# Patient Record
Sex: FEMALE | Race: ASIAN | NOT HISPANIC OR LATINO | Employment: OTHER | URBAN - METROPOLITAN AREA
[De-identification: names, ages, dates, MRNs, and addresses within clinical notes are randomized per-mention and may not be internally consistent; named-entity substitution may affect disease eponyms.]

---

## 2017-02-21 ENCOUNTER — TELEPHONE (OUTPATIENT)
Dept: FAMILY MEDICINE CLINIC | Facility: CLINIC | Age: 57
End: 2017-02-21

## 2017-02-21 DIAGNOSIS — I10 ESSENTIAL HYPERTENSION: ICD-10-CM

## 2017-02-21 DIAGNOSIS — E88.81 INSULIN RESISTANCE: ICD-10-CM

## 2017-02-21 DIAGNOSIS — E78.5 HYPERLIPIDEMIA, UNSPECIFIED HYPERLIPIDEMIA TYPE: ICD-10-CM

## 2017-02-21 DIAGNOSIS — E03.9 HYPOTHYROIDISM, UNSPECIFIED TYPE: Primary | ICD-10-CM

## 2017-02-21 NOTE — TELEPHONE ENCOUNTER
Patient is on the schedule for labs tomorrow in preparation for an upcoming physical. Please add orders.

## 2017-02-22 ENCOUNTER — LAB (OUTPATIENT)
Dept: FAMILY MEDICINE CLINIC | Facility: CLINIC | Age: 57
End: 2017-02-22

## 2017-02-22 DIAGNOSIS — E88.81 INSULIN RESISTANCE: ICD-10-CM

## 2017-02-22 DIAGNOSIS — I10 ESSENTIAL HYPERTENSION: ICD-10-CM

## 2017-02-22 DIAGNOSIS — E03.9 HYPOTHYROIDISM, UNSPECIFIED TYPE: ICD-10-CM

## 2017-02-22 DIAGNOSIS — E78.5 HYPERLIPIDEMIA, UNSPECIFIED HYPERLIPIDEMIA TYPE: ICD-10-CM

## 2017-02-23 LAB
ALBUMIN SERPL-MCNC: 4.7 G/DL (ref 3.5–5.5)
ALBUMIN/GLOB SERPL: 1.4 {RATIO} (ref 1.1–2.5)
ALP SERPL-CCNC: 83 IU/L (ref 39–117)
ALT SERPL-CCNC: 22 IU/L (ref 0–32)
AST SERPL-CCNC: 17 IU/L (ref 0–40)
BILIRUB SERPL-MCNC: 0.5 MG/DL (ref 0–1.2)
BUN SERPL-MCNC: 38 MG/DL (ref 6–24)
BUN/CREAT SERPL: 31 (ref 9–23)
CALCIUM SERPL-MCNC: 10.2 MG/DL (ref 8.7–10.2)
CHLORIDE SERPL-SCNC: 91 MMOL/L (ref 96–106)
CHOLEST SERPL-MCNC: 426 MG/DL (ref 100–199)
CO2 SERPL-SCNC: 23 MMOL/L (ref 18–29)
CONV COMMENT: ABNORMAL
CREAT SERPL-MCNC: 1.22 MG/DL (ref 0.57–1)
ERYTHROCYTE [DISTWIDTH] IN BLOOD BY AUTOMATED COUNT: 13.1 % (ref 12.3–15.4)
GLOBULIN SER CALC-MCNC: 3.3 G/DL (ref 1.5–4.5)
GLUCOSE SERPL-MCNC: 89 MG/DL (ref 65–99)
HBA1C MFR BLD: 5.8 % (ref 4.8–5.6)
HCT VFR BLD AUTO: 41.3 % (ref 34–46.6)
HDLC SERPL-MCNC: 59 MG/DL
HGB BLD-MCNC: 14.7 G/DL (ref 11.1–15.9)
LDLC SERPL CALC-MCNC: 343 MG/DL (ref 0–99)
LDLC/HDLC SERPL: 5.8 RATIO UNITS (ref 0–3.2)
MCH RBC QN AUTO: 29.7 PG (ref 26.6–33)
MCHC RBC AUTO-ENTMCNC: 35.6 G/DL (ref 31.5–35.7)
MCV RBC AUTO: 83 FL (ref 79–97)
PLATELET # BLD AUTO: 414 X10E3/UL (ref 150–379)
POTASSIUM SERPL-SCNC: 3.4 MMOL/L (ref 3.5–5.2)
PROT SERPL-MCNC: 8 G/DL (ref 6–8.5)
RBC # BLD AUTO: 4.95 X10E6/UL (ref 3.77–5.28)
SODIUM SERPL-SCNC: 138 MMOL/L (ref 134–144)
TRIGL SERPL-MCNC: 122 MG/DL (ref 0–149)
TSH SERPL DL<=0.005 MIU/L-ACNC: 0.28 UIU/ML (ref 0.45–4.5)
VLDLC SERPL CALC-MCNC: 24 MG/DL (ref 5–40)
WBC # BLD AUTO: 10.7 X10E3/UL (ref 3.4–10.8)

## 2017-02-23 RX ORDER — METOPROLOL SUCCINATE 50 MG/1
50 TABLET, EXTENDED RELEASE ORAL DAILY
Qty: 90 TABLET | Refills: 0 | Status: SHIPPED | OUTPATIENT
Start: 2017-02-23 | End: 2017-03-10 | Stop reason: SDUPTHER

## 2017-02-24 ENCOUNTER — OFFICE VISIT (OUTPATIENT)
Dept: FAMILY MEDICINE CLINIC | Facility: CLINIC | Age: 57
End: 2017-02-24

## 2017-02-24 VITALS
DIASTOLIC BLOOD PRESSURE: 80 MMHG | SYSTOLIC BLOOD PRESSURE: 140 MMHG | OXYGEN SATURATION: 98 % | WEIGHT: 217.9 LBS | BODY MASS INDEX: 37.2 KG/M2 | HEART RATE: 65 BPM | HEIGHT: 64 IN

## 2017-02-24 DIAGNOSIS — Z12.39 SCREENING FOR BREAST CANCER: ICD-10-CM

## 2017-02-24 DIAGNOSIS — E78.2 MIXED HYPERLIPIDEMIA: ICD-10-CM

## 2017-02-24 DIAGNOSIS — Z00.00 ROUTINE GENERAL MEDICAL EXAMINATION AT A HEALTH CARE FACILITY: Primary | ICD-10-CM

## 2017-02-24 DIAGNOSIS — I10 ESSENTIAL HYPERTENSION: ICD-10-CM

## 2017-02-24 DIAGNOSIS — Z11.59 NEED FOR HEPATITIS C SCREENING TEST: ICD-10-CM

## 2017-02-24 DIAGNOSIS — E88.81 INSULIN RESISTANCE: ICD-10-CM

## 2017-02-24 DIAGNOSIS — F41.9 ANXIETY: ICD-10-CM

## 2017-02-24 DIAGNOSIS — E03.9 HYPOTHYROIDISM, UNSPECIFIED TYPE: ICD-10-CM

## 2017-02-24 PROCEDURE — 99396 PREV VISIT EST AGE 40-64: CPT | Performed by: FAMILY MEDICINE

## 2017-02-24 PROCEDURE — 99214 OFFICE O/P EST MOD 30 MIN: CPT | Performed by: FAMILY MEDICINE

## 2017-02-24 RX ORDER — CITALOPRAM 20 MG/1
20 TABLET ORAL
Qty: 90 TABLET | Refills: 3 | COMMUNITY
Start: 2017-02-24 | End: 2017-02-24 | Stop reason: SDUPTHER

## 2017-02-24 RX ORDER — ROSUVASTATIN CALCIUM 20 MG/1
20 TABLET, COATED ORAL DAILY
Qty: 90 TABLET | Refills: 3 | Status: SHIPPED | OUTPATIENT
Start: 2017-02-24 | End: 2017-05-18 | Stop reason: SDUPTHER

## 2017-02-24 RX ORDER — TIZANIDINE 4 MG/1
4 TABLET ORAL 3 TIMES DAILY
COMMUNITY
End: 2022-06-07 | Stop reason: HOSPADM

## 2017-02-24 RX ORDER — CITALOPRAM 20 MG/1
20 TABLET ORAL DAILY
Qty: 90 TABLET | Refills: 3 | Status: SHIPPED | OUTPATIENT
Start: 2017-02-24 | End: 2018-02-20 | Stop reason: SDUPTHER

## 2017-02-24 NOTE — PATIENT INSTRUCTIONS
Personal Prevention Plan of Service   I will call you with lab results.  Date of Office Visit:  2017  Encounter Provider:  Jh Muniz MD  Place of Service:  [facility]  Patient Name: Sandra Kellogg  :  1960    As part of the preventive portion of your visit today, we are providing you with this personalized preventive plan of services (PPPS). This plan is based upon recommendations of the [place] Preventive Services Task Force (USPSTF) and the Advisory Committee on Immunization Practices (ACIP).    This lists the preventive care services that should be considered, and provides dates of when you are due. Items listed as completed are up-to-date and do not require any further intervention.    Health Maintenance   Topic Date Due   • PAP SMEAR  Advised   • LIPID PANEL  2018   • MAMMOGRAM  ordered   • TDAP/TD VACCINES (2 - Td) 2022   • COLONOSCOPY  2024   • HEPATITIS C SCREENING  Completed   • INFLUENZA VACCINE  Completed

## 2017-02-24 NOTE — PROGRESS NOTES
"Preventive Exam    History of Present Illness: Sandra is here for check up and review of routine health maintenance. She states she is doing okay but is struggling with a lot of pain and is followed by pain management.  She has a hx of anxiety and feels that Celexa helps. She would like a refill.  She has hypothyroidism and needs this checked today.She is taking meds as prescribed.  She has elevated choesterol and has not been taking Crestor regularly because of cost.She needs cholesterol checked today.  She has HTN and is on multiple meds and is taking as prescribed,     REVIEW OF SYSTEMS  Constitutional: Negative.    HENT: Negative.    Eyes: Negative.    Respiratory: Negative.    Cardiovascular: Negative.    Gastrointestinal: Negative.    Endocrine: Negative.    Genitourinary: Negative.    Musculoskeletal: Negative for neck stiffness.   Skin: Negative.    Allergic/Immunologic: Negative.    Neurological: Negative.    Hematological: Negative.    Psychiatric/Behavioral: Negative.    All other systems reviewed and are negative.          PHYSICAL EXAM    Vitals:    02/24/17 1434   BP: 140/80   BP Location: Right arm   Patient Position: Sitting   Pulse: 65   SpO2: 98%   Weight: 217 lb 14.4 oz (98.8 kg)   Height: 64\" (162.6 cm)     GENERAL: alert and oriented, afebrile and vital signs stable  HEENT: oral mucosa moist, PEERLA, EOM, conjunctiva normal  No cervical adenopathy  LUNGS: clear to ascultation bilaterally, no rales, ronchi or wheezing  HEART: RRR S1 S2 without murmers, thrills, rubs or gallops  CHEST WALL: within normal limits, no tenderness  BREAST EXAM: No masses, skin changes, tenderness or discharge noted  ABDOMEN: WNL. Normal BS.  EXTREMITIES: No clubbing, cyanosis or edema noted. Normal Pulses.  SKIN: warm, dry, no rashes noted  NEURO: CN II- XII grossly intact    ASSESSMENT AND PLAN  Problem List Items Addressed This Visit     None      Visit Diagnoses     Routine general medical examination at a health care " facility    -  Primary    Hypothyroidism, unspecified type        Relevant Orders    TSH (Completed)    Essential hypertension        Insulin resistance        Relevant Orders    Comprehensive Metabolic Panel (Completed)    Mixed hyperlipidemia        Relevant Medications    rosuvastatin (CRESTOR) 20 MG tablet    Other Relevant Orders    Comprehensive Metabolic Panel (Completed)    Need for hepatitis C screening test        Relevant Orders    Hepatitis C Antibody (Completed)    Screening for breast cancer        Relevant Orders    Mammo Screening Bilateral With CAD    Anxiety        Relevant Medications    citalopram (CeleXA) 20 MG tablet        Routine health maintenance reviewed and discussed with Sandra.    .  Orders Placed This Encounter   Procedures   • Mammo Screening Bilateral With CAD     Standing Status:   Future     Standing Expiration Date:   2/25/2018     Order Specific Question:   Reason for Exam:     Answer:   screening     Order Specific Question:   Is the patient pregnant?     Answer:   No   • Comprehensive Metabolic Panel   • TSH   • Hepatitis C Antibody     Return in about 6 months (around 8/24/2017) for Recheck.

## 2017-02-25 LAB
ALBUMIN SERPL-MCNC: 4.6 G/DL (ref 3.5–5.5)
ALBUMIN/GLOB SERPL: 1.4 {RATIO} (ref 1.1–2.5)
ALP SERPL-CCNC: 83 IU/L (ref 39–117)
ALT SERPL-CCNC: 20 IU/L (ref 0–32)
AST SERPL-CCNC: 19 IU/L (ref 0–40)
BILIRUB SERPL-MCNC: 0.5 MG/DL (ref 0–1.2)
BUN SERPL-MCNC: 33 MG/DL (ref 6–24)
BUN/CREAT SERPL: 26 (ref 9–23)
CALCIUM SERPL-MCNC: 10 MG/DL (ref 8.7–10.2)
CHLORIDE SERPL-SCNC: 92 MMOL/L (ref 96–106)
CO2 SERPL-SCNC: 24 MMOL/L (ref 18–29)
CREAT SERPL-MCNC: 1.27 MG/DL (ref 0.57–1)
GLOBULIN SER CALC-MCNC: 3.3 G/DL (ref 1.5–4.5)
GLUCOSE SERPL-MCNC: 100 MG/DL (ref 65–99)
HCV AB S/CO SERPL IA: <0.1 S/CO RATIO (ref 0–0.9)
POTASSIUM SERPL-SCNC: 3.4 MMOL/L (ref 3.5–5.2)
PROT SERPL-MCNC: 7.9 G/DL (ref 6–8.5)
SODIUM SERPL-SCNC: 139 MMOL/L (ref 134–144)
TSH SERPL DL<=0.005 MIU/L-ACNC: 0.27 UIU/ML (ref 0.45–4.5)

## 2017-02-27 RX ORDER — LISINOPRIL 20 MG/1
20 TABLET ORAL DAILY
Qty: 30 TABLET | Refills: 2 | Status: SHIPPED | OUTPATIENT
Start: 2017-02-27 | End: 2017-07-26 | Stop reason: SDUPTHER

## 2017-03-02 RX ORDER — LEVOTHYROXINE SODIUM 0.12 MG/1
125 TABLET ORAL DAILY
Qty: 30 TABLET | Refills: 6 | Status: SHIPPED | OUTPATIENT
Start: 2017-03-02 | End: 2017-03-15 | Stop reason: SDUPTHER

## 2017-03-10 RX ORDER — METOPROLOL SUCCINATE 50 MG/1
50 TABLET, EXTENDED RELEASE ORAL DAILY
Qty: 90 TABLET | Refills: 0 | Status: SHIPPED | OUTPATIENT
Start: 2017-03-10 | End: 2017-07-20 | Stop reason: SDUPTHER

## 2017-03-15 RX ORDER — HYDROCHLOROTHIAZIDE 25 MG/1
25 TABLET ORAL DAILY
Qty: 90 TABLET | Refills: 0 | Status: SHIPPED | OUTPATIENT
Start: 2017-03-15 | End: 2017-03-30 | Stop reason: SDUPTHER

## 2017-03-15 RX ORDER — AMLODIPINE BESYLATE 10 MG/1
10 TABLET ORAL DAILY
Qty: 90 TABLET | Refills: 0 | Status: SHIPPED | OUTPATIENT
Start: 2017-03-15 | End: 2017-03-31 | Stop reason: SDUPTHER

## 2017-03-15 RX ORDER — LEVOTHYROXINE SODIUM 0.12 MG/1
125 TABLET ORAL DAILY
Qty: 90 TABLET | Refills: 0 | Status: SHIPPED | OUTPATIENT
Start: 2017-03-15 | End: 2017-07-26 | Stop reason: DRUGHIGH

## 2017-03-30 RX ORDER — HYDROCHLOROTHIAZIDE 25 MG/1
25 TABLET ORAL DAILY
Qty: 90 TABLET | Refills: 0 | Status: SHIPPED | OUTPATIENT
Start: 2017-03-30 | End: 2017-07-20

## 2017-03-31 RX ORDER — AMLODIPINE BESYLATE 10 MG/1
10 TABLET ORAL DAILY
Qty: 90 TABLET | Refills: 0 | Status: SHIPPED | OUTPATIENT
Start: 2017-03-31 | End: 2017-06-29 | Stop reason: SDUPTHER

## 2017-05-04 RX ORDER — METOPROLOL SUCCINATE 50 MG/1
TABLET, EXTENDED RELEASE ORAL
Qty: 90 TABLET | Refills: 1 | Status: SHIPPED | OUTPATIENT
Start: 2017-05-04 | End: 2017-10-31 | Stop reason: SDUPTHER

## 2017-05-18 DIAGNOSIS — E78.2 MIXED HYPERLIPIDEMIA: ICD-10-CM

## 2017-05-18 RX ORDER — ROSUVASTATIN CALCIUM 20 MG/1
20 TABLET, COATED ORAL DAILY
Qty: 90 TABLET | Refills: 1 | Status: SHIPPED | OUTPATIENT
Start: 2017-05-18 | End: 2017-10-31 | Stop reason: SDUPTHER

## 2017-06-29 RX ORDER — AMLODIPINE BESYLATE 10 MG/1
TABLET ORAL
Qty: 90 TABLET | Refills: 0 | Status: SHIPPED | OUTPATIENT
Start: 2017-06-29 | End: 2017-09-27 | Stop reason: SDUPTHER

## 2017-07-17 DIAGNOSIS — R73.01 IMPAIRED FASTING GLUCOSE: Primary | ICD-10-CM

## 2017-07-17 DIAGNOSIS — E03.9 UNSPECIFIED HYPOTHYROIDISM: ICD-10-CM

## 2017-07-17 DIAGNOSIS — E78.2 MIXED HYPERLIPIDEMIA: ICD-10-CM

## 2017-07-17 LAB
ALBUMIN SERPL-MCNC: 4.4 G/DL (ref 3.5–5.2)
ALBUMIN/GLOB SERPL: 1.5 G/DL
ALP SERPL-CCNC: 93 U/L (ref 39–117)
ALT SERPL-CCNC: 20 U/L (ref 1–33)
AST SERPL-CCNC: 18 U/L (ref 1–32)
BILIRUB SERPL-MCNC: 0.2 MG/DL (ref 0.1–1.2)
BUN SERPL-MCNC: 26 MG/DL (ref 6–20)
BUN/CREAT SERPL: 27.7 (ref 7–25)
CALCIUM SERPL-MCNC: 10.1 MG/DL (ref 8.6–10.5)
CHLORIDE SERPL-SCNC: 105 MMOL/L (ref 98–107)
CHOLEST SERPL-MCNC: 218 MG/DL (ref 0–200)
CO2 SERPL-SCNC: 23 MMOL/L (ref 22–29)
CREAT SERPL-MCNC: 0.94 MG/DL (ref 0.57–1)
GLOBULIN SER CALC-MCNC: 3 GM/DL
GLUCOSE SERPL-MCNC: 97 MG/DL (ref 65–99)
HBA1C MFR BLD: 5.28 % (ref 4.8–5.6)
HDLC SERPL-MCNC: 64 MG/DL (ref 40–60)
LDLC SERPL CALC-MCNC: 139 MG/DL (ref 0–100)
LDLC/HDLC SERPL: 2.18 {RATIO}
POTASSIUM SERPL-SCNC: 4.2 MMOL/L (ref 3.5–5.2)
PROT SERPL-MCNC: 7.4 G/DL (ref 6–8.5)
SODIUM SERPL-SCNC: 142 MMOL/L (ref 136–145)
TRIGL SERPL-MCNC: 74 MG/DL (ref 0–150)
VLDLC SERPL CALC-MCNC: 14.8 MG/DL (ref 5–40)

## 2017-07-18 LAB — TSH SERPL DL<=0.005 MIU/L-ACNC: 0.88 MIU/ML (ref 0.27–4.2)

## 2017-07-20 ENCOUNTER — OFFICE VISIT (OUTPATIENT)
Dept: FAMILY MEDICINE CLINIC | Facility: CLINIC | Age: 57
End: 2017-07-20

## 2017-07-20 VITALS
HEART RATE: 65 BPM | OXYGEN SATURATION: 98 % | SYSTOLIC BLOOD PRESSURE: 120 MMHG | WEIGHT: 213 LBS | HEIGHT: 64 IN | DIASTOLIC BLOOD PRESSURE: 80 MMHG | BODY MASS INDEX: 36.37 KG/M2

## 2017-07-20 DIAGNOSIS — E78.2 MIXED HYPERLIPIDEMIA: ICD-10-CM

## 2017-07-20 DIAGNOSIS — Z87.891 STOPPED SMOKING WITH GREATER THAN 30 PACK YEAR HISTORY: Primary | ICD-10-CM

## 2017-07-20 DIAGNOSIS — N28.9 RENAL INSUFFICIENCY: ICD-10-CM

## 2017-07-20 DIAGNOSIS — E03.8 OTHER SPECIFIED HYPOTHYROIDISM: ICD-10-CM

## 2017-07-20 DIAGNOSIS — I10 ESSENTIAL HYPERTENSION: ICD-10-CM

## 2017-07-20 DIAGNOSIS — R73.01 IMPAIRED FASTING GLUCOSE: ICD-10-CM

## 2017-07-20 PROBLEM — E78.5 HYPERLIPIDEMIA: Status: ACTIVE | Noted: 2017-07-20

## 2017-07-20 PROCEDURE — 99214 OFFICE O/P EST MOD 30 MIN: CPT | Performed by: FAMILY MEDICINE

## 2017-07-20 NOTE — PROGRESS NOTES
Subjective   Sandra Kellogg is a 57 y.o. female here for 6MO. re-evaluation for HTN and hyperlipidemia.    History of Present Illness   She is still recovering from a knee replacement. She is doing well and progress is on track.  She has a history of chronic hyperlipidemia and needs lab work today to evaluate response to therapy. She has a hx of MI and needs to keep her cholesterol low.  She is tolerating medications well without side effects.  Sandra has a history of chronic hypertension and has been well controlled on current medications.She is tolerating medications without side effect. She reports no vision changes, headaches or lightheadedness. She is requesting refills of medications.  Impaired fasting glucose. She has been watching her diet and doing very well.  She has a long smoking hx and stopped about 9 years ago/  She has a hx of renal insufficiency that is stable.  Sandra has a history of chronic hypothyroidism and has been well controlled on current dose of replacement.She report no concerning symptoms: no cold intolerance, fatigue or hair loss.   The following portions of the patient's history were reviewed and updated as appropriate: allergies, current medications, past medical history, past social history, past surgical history and problem list.    Review of Systems   All other systems reviewed and are negative.      Objective   Physical Exam   Constitutional: She is oriented to person, place, and time. She appears well-developed.   HENT:   Head: Normocephalic and atraumatic.   Eyes: EOM are normal. Pupils are equal, round, and reactive to light.   Cardiovascular: Normal rate, regular rhythm and normal heart sounds.    Pulmonary/Chest: Effort normal and breath sounds normal.   Neurological: She is alert and oriented to person, place, and time.   Skin: Skin is warm and dry.   Psychiatric: She has a normal mood and affect. Her behavior is normal. Judgment and thought content normal.   Nursing note and vitals  reviewed.      Assessment/Plan   Sandra was seen today for hypertension and hypothyroidism.    Diagnoses and all orders for this visit:    Stopped smoking with greater than 30 pack year history  -     CT chest low dose wo; Future    Impaired fasting glucose  Advised a low carb diet and exercise.will check again in 6 months.  Renal insufficiency  Stable at this time - will check again in 6 months.    Essential hypertension  Well controlled on current medication, will refill medication today and as needed. She will RTO for repeat B/P check in 6 months.    Mixed hyperlipidemia  Lipid panel reviewed today and She was advised to continue a healthy low fat diet, include regular exercise and take medications as prescribed.  Hypothyroidism  Well managed on current dose of thyroid replacement. TSH reviewed and on chart.  Well refill medications as needed.

## 2017-07-21 PROBLEM — E03.9 HYPOTHYROIDISM: Status: ACTIVE | Noted: 2017-07-21

## 2017-07-26 RX ORDER — LISINOPRIL 20 MG/1
20 TABLET ORAL DAILY
Qty: 90 TABLET | Refills: 0 | Status: SHIPPED | OUTPATIENT
Start: 2017-07-26 | End: 2017-10-18 | Stop reason: SDUPTHER

## 2017-07-26 RX ORDER — LEVOTHYROXINE SODIUM 0.15 MG/1
150 TABLET ORAL DAILY
Qty: 90 TABLET | Refills: 1 | Status: SHIPPED | OUTPATIENT
Start: 2017-07-26 | End: 2018-01-22 | Stop reason: SDUPTHER

## 2017-09-27 RX ORDER — AMLODIPINE BESYLATE 10 MG/1
TABLET ORAL
Qty: 90 TABLET | Refills: 1 | Status: SHIPPED | OUTPATIENT
Start: 2017-09-27 | End: 2018-03-26 | Stop reason: SDUPTHER

## 2017-10-19 RX ORDER — LISINOPRIL 20 MG/1
TABLET ORAL
Qty: 90 TABLET | Refills: 0 | Status: SHIPPED | OUTPATIENT
Start: 2017-10-19 | End: 2018-01-18 | Stop reason: SDUPTHER

## 2017-10-31 DIAGNOSIS — E78.2 MIXED HYPERLIPIDEMIA: ICD-10-CM

## 2017-10-31 RX ORDER — METOPROLOL SUCCINATE 50 MG/1
TABLET, EXTENDED RELEASE ORAL
Qty: 90 TABLET | Refills: 1 | Status: SHIPPED | OUTPATIENT
Start: 2017-10-31 | End: 2018-04-29 | Stop reason: SDUPTHER

## 2017-10-31 RX ORDER — ROSUVASTATIN CALCIUM 20 MG/1
TABLET, COATED ORAL
Qty: 90 TABLET | Refills: 1 | Status: SHIPPED | OUTPATIENT
Start: 2017-10-31 | End: 2018-04-29 | Stop reason: SDUPTHER

## 2018-01-08 DIAGNOSIS — Z79.899 HIGH RISK MEDICATION USE: ICD-10-CM

## 2018-01-08 DIAGNOSIS — E88.81 METABOLIC SYNDROME: ICD-10-CM

## 2018-01-08 DIAGNOSIS — E78.2 MIXED HYPERLIPIDEMIA: Primary | ICD-10-CM

## 2018-01-08 DIAGNOSIS — E03.9 HYPOTHYROIDISM, UNSPECIFIED TYPE: ICD-10-CM

## 2018-01-08 DIAGNOSIS — Z00.00 ROUTINE GENERAL MEDICAL EXAMINATION AT A HEALTH CARE FACILITY: ICD-10-CM

## 2018-01-13 ENCOUNTER — RESULTS ENCOUNTER (OUTPATIENT)
Dept: FAMILY MEDICINE CLINIC | Facility: CLINIC | Age: 58
End: 2018-01-13

## 2018-01-13 DIAGNOSIS — E03.9 HYPOTHYROIDISM, UNSPECIFIED TYPE: ICD-10-CM

## 2018-01-18 ENCOUNTER — OFFICE VISIT (OUTPATIENT)
Dept: FAMILY MEDICINE CLINIC | Facility: CLINIC | Age: 58
End: 2018-01-18

## 2018-01-18 VITALS
HEART RATE: 64 BPM | OXYGEN SATURATION: 97 % | WEIGHT: 225.2 LBS | BODY MASS INDEX: 38.45 KG/M2 | SYSTOLIC BLOOD PRESSURE: 122 MMHG | HEIGHT: 64 IN | DIASTOLIC BLOOD PRESSURE: 80 MMHG

## 2018-01-18 DIAGNOSIS — I10 ESSENTIAL HYPERTENSION: Primary | ICD-10-CM

## 2018-01-18 DIAGNOSIS — E03.8 OTHER SPECIFIED HYPOTHYROIDISM: ICD-10-CM

## 2018-01-18 DIAGNOSIS — E78.2 MIXED HYPERLIPIDEMIA: ICD-10-CM

## 2018-01-18 PROBLEM — I21.9 MYOCARDIAL INFARCTION (HCC): Status: ACTIVE | Noted: 2018-01-18

## 2018-01-18 PROBLEM — M17.12 ARTHRITIS OF LEFT KNEE: Status: ACTIVE | Noted: 2017-11-01

## 2018-01-18 PROBLEM — Z96.652 STATUS POST TOTAL LEFT KNEE REPLACEMENT: Status: ACTIVE | Noted: 2017-11-01

## 2018-01-18 LAB
ALBUMIN SERPL-MCNC: 4.6 G/DL (ref 3.5–5.2)
ALBUMIN/GLOB SERPL: 1.5 G/DL
ALP SERPL-CCNC: 164 U/L (ref 39–117)
ALT SERPL-CCNC: 22 U/L (ref 1–33)
AST SERPL-CCNC: 16 U/L (ref 1–32)
BILIRUB SERPL-MCNC: 0.3 MG/DL (ref 0.1–1.2)
BUN SERPL-MCNC: 33 MG/DL (ref 6–20)
BUN/CREAT SERPL: 27.7 (ref 7–25)
CALCIUM SERPL-MCNC: 9.8 MG/DL (ref 8.6–10.5)
CHLORIDE SERPL-SCNC: 100 MMOL/L (ref 98–107)
CHOLEST SERPL-MCNC: 256 MG/DL (ref 0–200)
CHOLEST/HDLC SERPL: 5.95 {RATIO}
CO2 SERPL-SCNC: 25.7 MMOL/L (ref 22–29)
CREAT SERPL-MCNC: 1.19 MG/DL (ref 0.57–1)
GLOBULIN SER CALC-MCNC: 3.1 GM/DL
GLUCOSE SERPL-MCNC: 96 MG/DL (ref 65–99)
HDLC SERPL-MCNC: 43 MG/DL (ref 40–60)
LDLC SERPL CALC-MCNC: 183 MG/DL (ref 0–100)
POTASSIUM SERPL-SCNC: 4.7 MMOL/L (ref 3.5–5.2)
PROT SERPL-MCNC: 7.7 G/DL (ref 6–8.5)
SODIUM SERPL-SCNC: 138 MMOL/L (ref 136–145)
TRIGL SERPL-MCNC: 148 MG/DL (ref 0–150)
TSH SERPL DL<=0.005 MIU/L-ACNC: 6.7 MIU/ML (ref 0.27–4.2)
VLDLC SERPL CALC-MCNC: 29.6 MG/DL (ref 5–40)

## 2018-01-18 PROCEDURE — 99214 OFFICE O/P EST MOD 30 MIN: CPT | Performed by: FAMILY MEDICINE

## 2018-01-18 RX ORDER — OXYCODONE AND ACETAMINOPHEN 10; 325 MG/1; MG/1
1-2 TABLET ORAL
COMMUNITY
Start: 2017-11-30 | End: 2019-03-02 | Stop reason: HOSPADM

## 2018-01-18 RX ORDER — LISINOPRIL 20 MG/1
20 TABLET ORAL DAILY
Qty: 90 TABLET | Refills: 3 | Status: SHIPPED | OUTPATIENT
Start: 2018-01-18 | End: 2018-10-02 | Stop reason: DRUGHIGH

## 2018-01-18 RX ORDER — ISOSORBIDE MONONITRATE 60 MG/1
60 TABLET, EXTENDED RELEASE ORAL DAILY
Qty: 90 TABLET | Refills: 3 | Status: SHIPPED | OUTPATIENT
Start: 2018-01-18 | End: 2019-03-20 | Stop reason: SDUPTHER

## 2018-01-18 NOTE — PROGRESS NOTES
Subjective   Sandra Kellogg is a 57 y.o. female.     History of Present Illness   Sandra presents today as a 6 month follow up on her hypertension and hypothyroidism. She has had surgeries on both arms and left total knee since last visit.  Sandra has a history of chronic hypertension and has been well controlled on current medications.She is tolerating medications without side effect. She reports no vision changes, headaches or lightheadedness. She is requesting refills of medications.  She has a history of chronic hyperlipidemia and needs lab work today to evaluate response to therapy. She is tolerating medications well without side effects.  Sandra has a history of chronic hypothyroidism and has been well controlled on current dose of replacement.She report no concerning symptoms: no cold intolerance, fatigue or hair loss.   The following portions of the patient's history were reviewed and updated as appropriate: allergies, current medications, past medical history, past social history, past surgical history and problem list.    Review of Systems   Genitourinary: Positive for frequency.   Musculoskeletal: Positive for back pain, joint swelling and myalgias.   Allergic/Immunologic: Positive for environmental allergies.   Neurological: Positive for numbness (and tingling   form knee surgery  plus poor balance).       Objective   Physical Exam   Constitutional: She appears well-developed and well-nourished.   HENT:   Head: Normocephalic and atraumatic.   Eyes: Conjunctivae and EOM are normal. Pupils are equal, round, and reactive to light.   Neck: Normal range of motion. Neck supple.   Cardiovascular: Normal rate and regular rhythm.    Pulmonary/Chest: Effort normal.   Musculoskeletal: Normal range of motion.   Left wrist in cast   Neurological: She is alert.   Skin: Skin is warm and dry. No rash noted.   Psychiatric: She has a normal mood and affect. Her behavior is normal. Judgment and thought content normal.   Nursing note  and vitals reviewed.      Assessment/Plan   Sandra was seen today for hypertension.    Diagnoses and all orders for this visit:    Essential hypertension  -     lisinopril (PRINIVIL,ZESTRIL) 20 MG tablet; Take 1 tablet by mouth Daily.  -     Comprehensive Metabolic Panel    Other specified hypothyroidism  -     TSH    Mixed hyperlipidemia  -     Comprehensive Metabolic Panel  -     Lipid Panel With / Chol / HDL Ratio    Other orders  -     isosorbide mononitrate (IMDUR) 60 MG 24 hr tablet; Take 1 tablet by mouth Daily.      I will call with ;lab results.

## 2018-01-22 DIAGNOSIS — E03.9 HYPOTHYROIDISM, UNSPECIFIED TYPE: Primary | ICD-10-CM

## 2018-01-22 RX ORDER — LEVOTHYROXINE SODIUM 0.15 MG/1
TABLET ORAL
Qty: 90 TABLET | Refills: 1 | Status: SHIPPED | OUTPATIENT
Start: 2018-01-22 | End: 2018-05-08 | Stop reason: DRUGHIGH

## 2018-02-20 DIAGNOSIS — F41.9 ANXIETY: ICD-10-CM

## 2018-02-20 RX ORDER — CITALOPRAM 20 MG/1
TABLET ORAL
Qty: 90 TABLET | Refills: 3 | Status: SHIPPED | OUTPATIENT
Start: 2018-02-20 | End: 2018-09-04

## 2018-02-27 ENCOUNTER — RESULTS ENCOUNTER (OUTPATIENT)
Dept: FAMILY MEDICINE CLINIC | Facility: CLINIC | Age: 58
End: 2018-02-27

## 2018-02-27 DIAGNOSIS — E03.9 HYPOTHYROIDISM, UNSPECIFIED TYPE: ICD-10-CM

## 2018-03-26 RX ORDER — AMLODIPINE BESYLATE 10 MG/1
TABLET ORAL
Qty: 90 TABLET | Refills: 1 | Status: SHIPPED | OUTPATIENT
Start: 2018-03-26 | End: 2018-09-22 | Stop reason: SDUPTHER

## 2018-04-29 DIAGNOSIS — E78.2 MIXED HYPERLIPIDEMIA: ICD-10-CM

## 2018-04-30 RX ORDER — METOPROLOL SUCCINATE 50 MG/1
TABLET, EXTENDED RELEASE ORAL
Qty: 90 TABLET | Refills: 1 | Status: SHIPPED | OUTPATIENT
Start: 2018-04-30 | End: 2018-10-27 | Stop reason: SDUPTHER

## 2018-04-30 RX ORDER — ROSUVASTATIN CALCIUM 20 MG/1
TABLET, COATED ORAL
Qty: 90 TABLET | Refills: 1 | Status: SHIPPED | OUTPATIENT
Start: 2018-04-30 | End: 2018-10-27 | Stop reason: SDUPTHER

## 2018-05-03 ENCOUNTER — OFFICE VISIT (OUTPATIENT)
Dept: FAMILY MEDICINE CLINIC | Facility: CLINIC | Age: 58
End: 2018-05-03

## 2018-05-03 VITALS
SYSTOLIC BLOOD PRESSURE: 144 MMHG | HEART RATE: 72 BPM | OXYGEN SATURATION: 96 % | HEIGHT: 64 IN | DIASTOLIC BLOOD PRESSURE: 88 MMHG | WEIGHT: 246 LBS | BODY MASS INDEX: 42 KG/M2 | RESPIRATION RATE: 16 BRPM

## 2018-05-03 DIAGNOSIS — Z23 NEED FOR VACCINATION: ICD-10-CM

## 2018-05-03 DIAGNOSIS — Z87.891 HISTORY OF CIGARETTE SMOKING: ICD-10-CM

## 2018-05-03 DIAGNOSIS — E03.9 ACQUIRED HYPOTHYROIDISM: Primary | ICD-10-CM

## 2018-05-03 DIAGNOSIS — I10 ESSENTIAL HYPERTENSION: ICD-10-CM

## 2018-05-03 LAB — TSH SERPL DL<=0.005 MIU/L-ACNC: 0.07 MIU/ML (ref 0.27–4.2)

## 2018-05-03 PROCEDURE — 99214 OFFICE O/P EST MOD 30 MIN: CPT | Performed by: FAMILY MEDICINE

## 2018-05-03 PROCEDURE — 90471 IMMUNIZATION ADMIN: CPT | Performed by: FAMILY MEDICINE

## 2018-05-03 PROCEDURE — 90632 HEPA VACCINE ADULT IM: CPT | Performed by: FAMILY MEDICINE

## 2018-05-03 NOTE — PROGRESS NOTES
Subjective   Sandra Kellogg is a 57 y.o. female.     History of Present Illness   Sandra is here for bp check.  She states she is doing well.  She c/o multi joint aches.  She is  taking multiple medications and tolerating them well.  She denies headache, dizziness or vision changes.  Sandra has a history of chronic hypertension and has been well controlled on current medications.She is tolerating medications without side effect. She reports no vision changes, headaches or lightheadedness. She is requesting refills of medications.  She is a former smoker and has not been screened yet for lung cancer. She is interested in doing that.  The following portions of the patient's history were reviewed and updated as appropriate: allergies, current medications, past family history, past medical history, past social history, past surgical history and problem list.    Review of Systems   Cardiovascular: Positive for leg swelling.   Gastrointestinal: Positive for constipation and diarrhea.   Musculoskeletal: Positive for arthralgias and back pain.   Neurological: Positive for weakness.   Psychiatric/Behavioral: The patient is nervous/anxious.    All other systems reviewed and are negative.      Objective   Physical Exam   Constitutional: She is oriented to person, place, and time. She appears well-developed.   HENT:   Head: Normocephalic and atraumatic.   Eyes: EOM are normal. Pupils are equal, round, and reactive to light.   Neck: Normal range of motion. Neck supple.   Cardiovascular: Normal rate, regular rhythm and normal heart sounds.    Pulmonary/Chest: Effort normal and breath sounds normal.   Musculoskeletal: Normal range of motion.   Neurological: She is alert and oriented to person, place, and time.   Skin: Skin is warm and dry.   Nursing note and vitals reviewed.      Assessment/Plan   Sandra was seen today for hypertension.    Diagnoses and all orders for this visit:    Acquired hypothyroidism  -     TSH collected today to  evaluate response to therapy    Essential hypertension  Well controlled on current medication, will refill medication today and as needed. She will RTO for repeat B/P check in 6 months.    History of cigarette smoking  -     Ambulatory Referral to Multi-Disciplinary Clinic    She is 57, has a 66 pack year history and quit 9 years ago. She is willing to get screening.,

## 2018-05-07 ENCOUNTER — TELEPHONE (OUTPATIENT)
Dept: FAMILY MEDICINE CLINIC | Facility: CLINIC | Age: 58
End: 2018-05-07

## 2018-05-07 NOTE — TELEPHONE ENCOUNTER
msg left for pt to return call so we can find out requested information.        ----- Message from Jh Muniz MD sent at 5/4/2018  6:27 PM EDT -----  Please call Sandra - confirm that she is taking levothyroxine 150 mcg. If this is correct, we need to reduce to 125 mcg and check again in 3 months. Please call in change for her and make the change in the chart.

## 2018-05-08 RX ORDER — LEVOTHYROXINE SODIUM 0.12 MG/1
125 TABLET ORAL DAILY
Qty: 90 TABLET | Refills: 1 | Status: SHIPPED | OUTPATIENT
Start: 2018-05-08 | End: 2018-10-18 | Stop reason: SDUPTHER

## 2018-05-22 NOTE — PATIENT INSTRUCTIONS
Per verbal order from Dr. Soria change RFA to MBB. Case changed.    Left message to return call.     Please f/u in 6 months for a preventive exam.

## 2018-05-29 DIAGNOSIS — Z12.39 BREAST CANCER SCREENING: Primary | ICD-10-CM

## 2018-06-06 DIAGNOSIS — G89.29 OTHER CHRONIC PAIN: Primary | ICD-10-CM

## 2018-06-11 ENCOUNTER — CLINICAL SUPPORT (OUTPATIENT)
Dept: OTHER | Facility: HOSPITAL | Age: 58
End: 2018-06-11

## 2018-06-11 ENCOUNTER — HOSPITAL ENCOUNTER (OUTPATIENT)
Dept: PET IMAGING | Facility: HOSPITAL | Age: 58
Discharge: HOME | End: 2018-06-11
Admitting: NURSE PRACTITIONER

## 2018-06-11 VITALS — BODY MASS INDEX: 44.58 KG/M2 | WEIGHT: 251.6 LBS | HEIGHT: 63 IN

## 2018-06-11 DIAGNOSIS — Z12.2 ENCOUNTER FOR SCREENING FOR LUNG CANCER: ICD-10-CM

## 2018-06-11 DIAGNOSIS — Z87.891 PERSONAL HISTORY OF TOBACCO USE, PRESENTING HAZARDS TO HEALTH: ICD-10-CM

## 2018-06-11 DIAGNOSIS — Z87.891 PERSONAL HISTORY OF TOBACCO USE, PRESENTING HAZARDS TO HEALTH: Primary | ICD-10-CM

## 2018-06-11 PROCEDURE — G0296 VISIT TO DETERM LDCT ELIG: HCPCS | Performed by: NURSE PRACTITIONER

## 2018-06-11 PROCEDURE — G0297 LDCT FOR LUNG CA SCREEN: HCPCS

## 2018-06-11 NOTE — PROGRESS NOTES
"[facility]   LOW-DOSE LUNG CT SHARED DECISION MAKING VISIT    Sandra Kellogg is a pleasant 57 y.o. [place]  female seen today at the request of Jh Muniz MD in our Multidisciplinary Clinic. Here today for a shared decision making visit prior to Low-Dose CT Lung Cancer Screening.    HPI:   57 y.o. female who today reports a 48 pack year history.   her Cancer-related family history includes Breast cancer in her paternal aunt; Esophageal cancer in her paternal aunt; Lung cancer in her father.    Patient does not have a personal history of cancer      SOCIAL HISTORY:  she  reports that she quit smoking about 9 years ago. Her smoking use included Cigarettes. She started smoking about 42 years ago. She has a 48.00 pack-year smoking history. She has never used smokeless tobacco. She reports that she does not drink alcohol or use drugs.    Patient Denies current second hand smoke exposure. Patient currently lives with her  who is a non-smoker.    Patient does have a basement/current risk for radon exposure. Provided patient with written information for the [place] Radon Program and free testing process. Patient Denies prior asbestos exposure. she has been employed as a physical therapist - did grow up in [place] where use of DDT was common with regards to other environmental exposures.      Ht 160 cm (63\")   Wt 114 kg (251 lb 9.6 oz)   BMI 44.57 kg/m²       DISCUSSION HELD TODAY:     Smoking cessation counseling: I advised the patient of the risks in continuing to use tobacco. In addition to cardiovascular risk there is an increased risk for cancers of the oral cavity and pharynx, larynx, lung, esophagus, pancreas, uterine, cervix, kidney, bladder, stomach, colon, rectum and liver as well as myeloid leukemia. We also discussed that this risk may also extend to development of female breast cancer and advanced stage prostate cancer.     Patient verbalized they have remained tobacco free since " quitting about 11 years ago. Patient has noticed improvements in breathing since quitting. Patient counseled on importance of continued abstinence remains committed to remaining a non-smoker.    Shared decision making for lung cancer screening: We discussed the NCCN guidelines for lung cancer screening and importance of annual screening. The patient verbalized understanding that annual screening is recommended for all persons between ages 55 and 80, with at least a 30 pack year smoking history until fifteen years beyond smoking. The Lung Cancer Screening Shared Decision-Making Tool was utilized for review. We discussed possible benefits of screening include reduced risk of dying from lung cancer, potential for increased success of treatment, early detection, and possibility of more treatment options if cancer is detected. Also discussed possible harms of screening including total radiation exposure, false positive result leading to invasive procedures or biopsies with their own risks for complications, as well as over diagnosis or identification of slow growing cancers that would not lead to illness or death. We also discussed the potential impact of co-morbidities and future ability or willingness to undergo diagnosis and treatment should they be needed.    Based on this discussion the patient has decided to proceed with a Low Dose Lung Cancer Screening CT today. The patient requests that the results of his screening be shared with his PCP as well.       The patient verbalizes understanding that we will call with results of this low dose lung CT exam and that assistance will be provided in arranging any necessary follow-up. After review of the NCCN guidelines and recommendations for ongoing screening, the patient verbalized understanding of recommendations for follow-up.     Orders Placed This Encounter   Procedures   • CT Chest Low Dose Wo     Standing Status:   Future     Number of Occurrences:   1     Standing  Expiration Date:   6/11/2019     Scheduling Instructions:      Age 55-77 years. Patient age: 57; Patient is asymptomatic (no signs or symptoms of lung cancer): Yes ; Tobacco smoking history of at least 30 pack years. Patient pack year history: 32 ; If former smoker, number of years ago patient quit smoking (Must be < 15 years): 10     Order Specific Question:   Patient Pregnant     Answer:   No

## 2018-06-13 ENCOUNTER — APPOINTMENT (OUTPATIENT)
Dept: WOMENS IMAGING | Facility: HOSPITAL | Age: 58
End: 2018-06-13

## 2018-06-13 PROCEDURE — 77063 BREAST TOMOSYNTHESIS BI: CPT | Performed by: RADIOLOGY

## 2018-06-13 PROCEDURE — 77067 SCR MAMMO BI INCL CAD: CPT | Performed by: RADIOLOGY

## 2018-06-15 NOTE — PROGRESS NOTES
Result review: low dose CT scans. 3 noncalcified nodules measuring up to 6 mm in right middle lobe. Radiology recommendation for follow-up CT chest in 6 months. Multidisciplinary clinic recommends ordering CT chest w/o as opposed to low dose CT chest as insurance typically will not pay for 2nd low dose at 6 month interval. Spoke with patient by phone regarding result and recommendation. She understands I defer to you for follow up plan. Thank you for your referral, Milady

## 2018-08-08 ENCOUNTER — TELEPHONE (OUTPATIENT)
Dept: FAMILY MEDICINE CLINIC | Facility: CLINIC | Age: 58
End: 2018-08-08

## 2018-08-08 NOTE — TELEPHONE ENCOUNTER
----- Message from Jennifer Charlton sent at 8/8/2018 10:46 AM EDT -----  Dr. Cosme office called to notify the office that she failed her last urine drug screen and will be discharged from Dr. Cosme's care.  (she was prescribed Oxycodone and urine showed only Hydrocodone)

## 2018-09-04 ENCOUNTER — OFFICE VISIT (OUTPATIENT)
Dept: FAMILY MEDICINE CLINIC | Facility: CLINIC | Age: 58
End: 2018-09-04

## 2018-09-04 VITALS
BODY MASS INDEX: 46.07 KG/M2 | SYSTOLIC BLOOD PRESSURE: 150 MMHG | HEART RATE: 80 BPM | DIASTOLIC BLOOD PRESSURE: 90 MMHG | HEIGHT: 63 IN | RESPIRATION RATE: 16 BRPM | WEIGHT: 260 LBS | OXYGEN SATURATION: 98 %

## 2018-09-04 DIAGNOSIS — I10 ESSENTIAL HYPERTENSION: ICD-10-CM

## 2018-09-04 DIAGNOSIS — F34.1 DYSTHYMIA: ICD-10-CM

## 2018-09-04 DIAGNOSIS — E03.9 ACQUIRED HYPOTHYROIDISM: ICD-10-CM

## 2018-09-04 DIAGNOSIS — N28.9 RENAL INSUFFICIENCY: ICD-10-CM

## 2018-09-04 DIAGNOSIS — E78.2 MIXED HYPERLIPIDEMIA: ICD-10-CM

## 2018-09-04 DIAGNOSIS — Z00.00 ROUTINE GENERAL MEDICAL EXAMINATION AT A HEALTH CARE FACILITY: Primary | ICD-10-CM

## 2018-09-04 DIAGNOSIS — N39.41 URGE INCONTINENCE OF URINE: ICD-10-CM

## 2018-09-04 PROBLEM — F32.A DEPRESSION: Status: ACTIVE | Noted: 2018-09-04

## 2018-09-04 LAB
ALBUMIN SERPL-MCNC: 4.4 G/DL (ref 3.5–5.2)
ALBUMIN/GLOB SERPL: 1.5 G/DL
ALP SERPL-CCNC: 96 U/L (ref 39–117)
ALT SERPL-CCNC: 25 U/L (ref 1–33)
AST SERPL-CCNC: 19 U/L (ref 1–32)
BILIRUB SERPL-MCNC: 0.4 MG/DL (ref 0.1–1.2)
BUN SERPL-MCNC: 20 MG/DL (ref 6–20)
BUN/CREAT SERPL: 21.3 (ref 7–25)
CALCIUM SERPL-MCNC: 10.1 MG/DL (ref 8.6–10.5)
CHLORIDE SERPL-SCNC: 101 MMOL/L (ref 98–107)
CHOLEST SERPL-MCNC: 215 MG/DL (ref 0–200)
CHOLEST/HDLC SERPL: 5.51 {RATIO}
CO2 SERPL-SCNC: 23.7 MMOL/L (ref 22–29)
CREAT SERPL-MCNC: 0.94 MG/DL (ref 0.57–1)
ERYTHROCYTE [DISTWIDTH] IN BLOOD BY AUTOMATED COUNT: 12.6 % (ref 11.7–13)
GLOBULIN SER CALC-MCNC: 3 GM/DL
GLUCOSE SERPL-MCNC: 101 MG/DL (ref 65–99)
HCT VFR BLD AUTO: 44.7 % (ref 35.6–45.5)
HDLC SERPL-MCNC: 39 MG/DL (ref 40–60)
HGB BLD-MCNC: 14 G/DL (ref 11.9–15.5)
LDLC SERPL CALC-MCNC: 138 MG/DL (ref 0–100)
MCH RBC QN AUTO: 30 PG (ref 26.9–32)
MCHC RBC AUTO-ENTMCNC: 31.3 G/DL (ref 32.4–36.3)
MCV RBC AUTO: 95.7 FL (ref 80.5–98.2)
PLATELET # BLD AUTO: 297 10*3/MM3 (ref 140–500)
POTASSIUM SERPL-SCNC: 4.2 MMOL/L (ref 3.5–5.2)
PROT SERPL-MCNC: 7.4 G/DL (ref 6–8.5)
RBC # BLD AUTO: 4.67 10*6/MM3 (ref 3.9–5.2)
SODIUM SERPL-SCNC: 141 MMOL/L (ref 136–145)
TRIGL SERPL-MCNC: 190 MG/DL (ref 0–150)
TSH SERPL DL<=0.005 MIU/L-ACNC: 1.23 MIU/ML (ref 0.27–4.2)
VLDLC SERPL CALC-MCNC: 38 MG/DL (ref 5–40)
WBC # BLD AUTO: 7.73 10*3/MM3 (ref 4.5–10.7)

## 2018-09-04 PROCEDURE — 99396 PREV VISIT EST AGE 40-64: CPT | Performed by: FAMILY MEDICINE

## 2018-09-04 PROCEDURE — 99214 OFFICE O/P EST MOD 30 MIN: CPT | Performed by: FAMILY MEDICINE

## 2018-09-04 RX ORDER — OXYBUTYNIN CHLORIDE 5 MG/1
5 TABLET, EXTENDED RELEASE ORAL DAILY
Qty: 90 TABLET | Refills: 1 | Status: SHIPPED | OUTPATIENT
Start: 2018-09-04 | End: 2019-01-09 | Stop reason: SDUPTHER

## 2018-09-04 RX ORDER — DULOXETIN HYDROCHLORIDE 30 MG/1
30 CAPSULE, DELAYED RELEASE ORAL DAILY
Qty: 90 CAPSULE | Refills: 2 | Status: SHIPPED | OUTPATIENT
Start: 2018-09-04 | End: 2018-11-13 | Stop reason: DRUGHIGH

## 2018-09-04 NOTE — PROGRESS NOTES
"Preventive Exam    History of Present Illness: Sandra is here for check up and review of routine health maintenance.   She is still really struggling with pain. She will see a new pain management doc in 2 days.She hurts all over.   Sandra has a history of chronic hypertension and has been well controlled on current medications.She is tolerating medications without side effect. She reports no vision changes, headaches or lightheadedness. She is requesting refills of medications.  Sandra has a history of chronic hypothyroidism and has been well controlled on current dose of replacement.She report no concerning symptoms: no cold intolerance, fatigue or hair loss.   She has a history of chronic hyperlipidemia and needs lab work today to evaluate response to therapy. She is tolerating medications well without side effects.  Sandra has a hx of depression and states that she had not been getting  good relief from symptoms on current medication, Celexa. This is a chronic problem.  She had no intolerable side effects to medication. She has a lot of pain and has not tried Cymbalta. No thoughts of self harm expressed.  She has concerns about urinary incontinence. She states that this started last November after her knee surgery and has continued, gotten worse so she is wearing protective underwear. She would like to try a medication.     REVIEW OF SYSTEMS  Constitutional: Negative.    HENT: Negative.    Eyes: Negative.    Respiratory: Negative.    Cardiovascular: Negative.    Gastrointestinal: Negative.    Endocrine: Negative.    Genitourinary: Negative.    Musculoskeletal: Negative.  Skin: Negative.    Allergic/Immunologic: Negative.    Neurological: Negative.    Hematological: Negative.    Psychiatric/Behavioral: Negative.    All other systems reviewed and are negative.          PHYSICAL EXAM    Vitals:    09/04/18 1022   BP: 150/90   Pulse: 80   Resp: 16   SpO2: 98%   Weight: 118 kg (260 lb)   Height: 160 cm (63\")     GENERAL: " alert and oriented, afebrile and vital signs stable  HEENT: oral mucosa moist, PEERLA, EOM, conjunctiva normal  No cervical adenopathy  LUNGS: clear to ascultation bilaterally, no rales, ronchi or wheezing  HEART: RRR S1 S2 without murmers, thrills, rubs or gallops  CHEST WALL: within normal limits, no tenderness  ABDOMEN: WNL. Normal BS.  EXTREMITIES: No clubbing, cyanosis or edema noted. Normal Pulses.  SKIN: warm, dry, no rashes noted  NEURO: CN II- XII grossly intact    ASSESSMENT AND PLAN  Problem List Items Addressed This Visit        Cardiovascular and Mediastinum    Essential hypertension    Well controlled on current medication, will refill medication today and as needed. She will RTO for repeat B/P check in 6 months.  Relevant Orders    Comprehensive Metabolic Panel    Hyperlipidemia  Will check cholesterol. She is tolerating Lipitor.    Relevant Orders    Lipid Panel With / Chol / HDL Ratio       Endocrine    Acquired hypothyroidism  Well managed on current dose of thyroid replacement. TSH reviewed and on chart.  Well refill medications as needed.    Relevant Orders    TSH       Other    Renal insufficiency    Relevant Orders    Comprehensive Metabolic Panel    Depression  After a discussion, we will try Cymbalta. I have started her on 30 mg and   Will titrate up as needed.    Relevant Medications    DULoxetine (CYMBALTA) 30 MG capsule      Other Visit Diagnoses     Routine general medical examination at a health care facility    -  Primary    Relevant Orders    CBC (No Diff)    Comprehensive Metabolic Panel    Urge incontinence of urine      Will try medication to help with symptoms.    Relevant Medications    oxybutynin XL (DITROPAN-XL) 5 MG 24 hr tablet        Routine health maintenance reviewed and discussed with Sandra.    .  Orders Placed This Encounter   Procedures   • CBC (No Diff)   • Comprehensive Metabolic Panel   • Lipid Panel With / Chol / HDL Ratio   • TSH     Return in about 4 weeks (around  10/2/2018) for Recheck.

## 2018-09-04 NOTE — PATIENT INSTRUCTIONS
Annual Wellness  Personal Prevention Plan of Service   I have started 2 new meds - oxybutynin to help with urinary problems and generic Cymbalta to help with depressive symptoms and pain. Please come back in one month for follow up.  I will call you with lab results.   Date of Office Visit:  2018  Encounter Provider:  Jh Muniz MD  Place of Service:  [facility]  Patient Name: Sandra Kellogg  :  1960    As part of the Annual  Wellness portion of your visit today, we are providing you with this personalized preventive plan of services (PPPS). This plan is based upon recommendations of the [place] Preventive Services Task Force (USPSTF) and the Advisory Committee on Immunization Practices (ACIP).    This lists the preventive care services that should be considered, and provides dates of when you are due. Items listed as completed are up-to-date and do not require any further intervention.    Health Maintenance   Topic Date Due   • ZOSTER VACCINE (1 of 2) 2011   • INFLUENZA VACCINE  2018   • LIPID PANEL  2019   • LUNG CANCER SCREENING  2019   • ANNUAL PHYSICAL  2019   • MAMMOGRAM  2020   • TDAP/TD VACCINES (2 - Td) 2022   • PAP SMEAR  2023   • COLONOSCOPY  2024   • HEPATITIS C SCREENING  Completed       Orders Placed This Encounter   Procedures   • CBC (No Diff)   • Comprehensive Metabolic Panel   • Lipid Panel With / Chol / HDL Ratio   • TSH       Return in about 4 weeks (around 10/2/2018) for Recheck.

## 2018-09-24 RX ORDER — AMLODIPINE BESYLATE 10 MG/1
TABLET ORAL
Qty: 90 TABLET | Refills: 1 | Status: SHIPPED | OUTPATIENT
Start: 2018-09-24 | End: 2019-01-29 | Stop reason: HOSPADM

## 2018-10-02 ENCOUNTER — OFFICE VISIT (OUTPATIENT)
Dept: FAMILY MEDICINE CLINIC | Facility: CLINIC | Age: 58
End: 2018-10-02

## 2018-10-02 VITALS
OXYGEN SATURATION: 98 % | HEIGHT: 63 IN | HEART RATE: 57 BPM | DIASTOLIC BLOOD PRESSURE: 92 MMHG | BODY MASS INDEX: 44.65 KG/M2 | SYSTOLIC BLOOD PRESSURE: 154 MMHG | WEIGHT: 252 LBS | RESPIRATION RATE: 16 BRPM

## 2018-10-02 DIAGNOSIS — N39.41 URGE INCONTINENCE OF URINE: Primary | ICD-10-CM

## 2018-10-02 DIAGNOSIS — Z23 NEED FOR VACCINATION: ICD-10-CM

## 2018-10-02 DIAGNOSIS — G89.29 OTHER CHRONIC PAIN: ICD-10-CM

## 2018-10-02 DIAGNOSIS — I10 ESSENTIAL HYPERTENSION: ICD-10-CM

## 2018-10-02 PROCEDURE — 99214 OFFICE O/P EST MOD 30 MIN: CPT | Performed by: FAMILY MEDICINE

## 2018-10-02 PROCEDURE — 90471 IMMUNIZATION ADMIN: CPT | Performed by: FAMILY MEDICINE

## 2018-10-02 PROCEDURE — 90674 CCIIV4 VAC NO PRSV 0.5 ML IM: CPT | Performed by: FAMILY MEDICINE

## 2018-10-02 RX ORDER — LISINOPRIL 40 MG/1
40 TABLET ORAL DAILY
Qty: 90 TABLET | Refills: 2 | Status: SHIPPED | OUTPATIENT
Start: 2018-10-02 | End: 2019-01-29 | Stop reason: HOSPADM

## 2018-10-02 NOTE — PATIENT INSTRUCTIONS
I have increased your lisinopril to 40 mg a day and let's check this again in 2-3 weeks.  Come back in 5 months for fasting labs.

## 2018-10-02 NOTE — PROGRESS NOTES
Subjective   Sandra Kellogg is a 58 y.o. female.     History of Present Illness   Sandra is here for a f/u on Cymbalta. Patient states that she does feel better on the medication. She feels that this is helping with her pain and mood. She is much brighter today.   She is taking oxybutynin for urge incontinence and feels that this is really helping.  She has a hx of HTN and is not well controlled on current medications. She is taking lisinopril 20 mg and amlodipine 10 mg   The following portions of the patient's history were reviewed and updated as appropriate: allergies, current medications, past family history, past medical history, past social history, past surgical history and problem list.    Review of Systems   Constitutional: Negative for activity change, appetite change, chills, fatigue, fever and unexpected weight change.   HENT: Positive for congestion and sinus pressure. Negative for ear pain, hearing loss, nosebleeds, rhinorrhea and sore throat.    Eyes: Negative for pain, redness and visual disturbance.   Respiratory: Negative for cough, shortness of breath and wheezing.    Cardiovascular: Negative for chest pain, palpitations and leg swelling.   Gastrointestinal: Negative for abdominal pain, blood in stool, constipation, diarrhea, nausea and vomiting.   Endocrine: Negative for cold intolerance and heat intolerance.   Genitourinary: Negative for difficulty urinating, dysuria, frequency, hematuria, pelvic pain, urgency and vaginal discharge.   Musculoskeletal: Positive for back pain. Negative for arthralgias and joint swelling.   Skin: Negative for rash and wound.   Allergic/Immunologic: Positive for environmental allergies.   Neurological: Positive for numbness. Negative for dizziness, weakness and headaches.   Hematological: Does not bruise/bleed easily.   Psychiatric/Behavioral: Negative for dysphoric mood, sleep disturbance and suicidal ideas. The patient is not nervous/anxious.        Objective   Physical  Exam   Constitutional: She is oriented to person, place, and time. She appears well-developed. No distress.   Ambulating with a cane   Cardiovascular: Normal rate, regular rhythm, normal heart sounds and intact distal pulses.    No murmur heard.  Pulmonary/Chest: Effort normal and breath sounds normal.   Neurological: She is alert and oriented to person, place, and time.   Psychiatric: She has a normal mood and affect. Her behavior is normal. Judgment and thought content normal.   Nursing note and vitals reviewed.      Assessment/Plan   Sandra was seen today for depression and follow-up.    Diagnoses and all orders for this visit:    Urge incontinence of urine  Doing well on current medication and feels it makes a big difference. Will refill.     Need for vaccination  -     Flucelvax Quad=>4Years ([id number])    Other chronic pain  She has had an excellent response to Cymbalta and will continue. She has pain relief and a real mood lift.     Essential hypertension  -     lisinopril (PRINIVIL,ZESTRIL) 40 MG tablet; Take 1 tablet by mouth Daily.  Not well controlled on current dose so I have increased her medication.  Patient Instructions   I have increased your lisinopril to 40 mg a day and let's check this again in 2-3 weeks.  Come back in 5 months for fasting labs.

## 2018-10-15 DIAGNOSIS — I10 ESSENTIAL HYPERTENSION: ICD-10-CM

## 2018-10-16 RX ORDER — LISINOPRIL 20 MG/1
TABLET ORAL
Qty: 90 TABLET | Refills: 1 | Status: SHIPPED | OUTPATIENT
Start: 2018-10-16 | End: 2018-12-04 | Stop reason: DRUGHIGH

## 2018-10-19 RX ORDER — LEVOTHYROXINE SODIUM 0.12 MG/1
TABLET ORAL
Qty: 90 TABLET | Refills: 1 | Status: SHIPPED | OUTPATIENT
Start: 2018-10-19 | End: 2019-01-09 | Stop reason: SDUPTHER

## 2018-10-27 DIAGNOSIS — E78.2 MIXED HYPERLIPIDEMIA: ICD-10-CM

## 2018-10-29 RX ORDER — METOPROLOL SUCCINATE 50 MG/1
TABLET, EXTENDED RELEASE ORAL
Qty: 90 TABLET | Refills: 1 | Status: SHIPPED | OUTPATIENT
Start: 2018-10-29 | End: 2019-11-20 | Stop reason: SDUPTHER

## 2018-10-29 RX ORDER — ROSUVASTATIN CALCIUM 20 MG/1
TABLET, COATED ORAL
Qty: 90 TABLET | Refills: 1 | Status: SHIPPED | OUTPATIENT
Start: 2018-10-29 | End: 2019-03-20 | Stop reason: SDUPTHER

## 2018-11-12 ENCOUNTER — TELEPHONE (OUTPATIENT)
Dept: FAMILY MEDICINE CLINIC | Facility: CLINIC | Age: 58
End: 2018-11-12

## 2018-11-12 NOTE — TELEPHONE ENCOUNTER
EDIN MISTRY and said she was supposed to call and let you know when her Cymbalta was no longer working for her.  It is no longer working.

## 2018-11-13 RX ORDER — DULOXETIN HYDROCHLORIDE 60 MG/1
60 CAPSULE, DELAYED RELEASE ORAL DAILY
Qty: 30 CAPSULE | Refills: 0 | Status: SHIPPED | OUTPATIENT
Start: 2018-11-13 | End: 2018-12-18 | Stop reason: SDUPTHER

## 2018-11-13 NOTE — TELEPHONE ENCOUNTER
She is on Cymbalta 30 mg - advise her to start taking 2 a day and follow up with me in 2-3 weeks.

## 2018-12-04 ENCOUNTER — OFFICE VISIT (OUTPATIENT)
Dept: FAMILY MEDICINE CLINIC | Facility: CLINIC | Age: 58
End: 2018-12-04

## 2018-12-04 VITALS
HEART RATE: 71 BPM | HEIGHT: 63 IN | RESPIRATION RATE: 16 BRPM | SYSTOLIC BLOOD PRESSURE: 154 MMHG | BODY MASS INDEX: 46.6 KG/M2 | WEIGHT: 263 LBS | DIASTOLIC BLOOD PRESSURE: 84 MMHG | OXYGEN SATURATION: 98 %

## 2018-12-04 DIAGNOSIS — I10 ESSENTIAL HYPERTENSION: Primary | ICD-10-CM

## 2018-12-04 DIAGNOSIS — F34.1 DYSTHYMIA: ICD-10-CM

## 2018-12-04 DIAGNOSIS — G89.4 CHRONIC PAIN SYNDROME: ICD-10-CM

## 2018-12-04 PROCEDURE — 99214 OFFICE O/P EST MOD 30 MIN: CPT | Performed by: FAMILY MEDICINE

## 2018-12-04 RX ORDER — HYDROCHLOROTHIAZIDE 25 MG/1
25 TABLET ORAL DAILY
Qty: 90 TABLET | Refills: 2 | Status: SHIPPED | OUTPATIENT
Start: 2018-12-04 | End: 2019-01-29 | Stop reason: HOSPADM

## 2018-12-04 NOTE — PROGRESS NOTES
Subjective   Sandra Kellogg is a 58 y.o. female.     History of Present Illness   Sandra is here for follow up of medication increase.  Her Cymbalta and Lisinopril were both increased.  She states she seems to be hurting more but believes that may be the cold weather.I increased Cymbalta for her to 60 mg about 2 weeks ago and she hoped this will help.      She states she takes her bp at home and she states it is lower at home than it is at this office.She is taking lisinopril as advised. She is willing to try adding a medication.     The following portions of the patient's history were reviewed and updated as appropriate: allergies, current medications, past medical history, past social history, past surgical history and problem list.    Review of Systems   Genitourinary: Positive for frequency.   Psychiatric/Behavioral: The patient is nervous/anxious.    All other systems reviewed and are negative.      Objective   Physical Exam   Constitutional: She is oriented to person, place, and time. She appears well-developed. No distress.   Ambulating with a cane   HENT:   Head: Normocephalic and atraumatic.   Eyes: EOM are normal. Pupils are equal, round, and reactive to light.   Neck: Normal range of motion.   Cardiovascular: Normal rate, regular rhythm, normal heart sounds and intact distal pulses.   No murmur heard.  Pulmonary/Chest: Effort normal and breath sounds normal.   Neurological: She is alert and oriented to person, place, and time.   Skin: Skin is warm and dry. No rash noted.   Psychiatric: She has a normal mood and affect. Her behavior is normal. Judgment and thought content normal.   Nursing note and vitals reviewed.      Assessment/Plan   Sandra was seen today for depression and hypertension.    Diagnoses and all orders for this visit:    Essential hypertension  She is not well controlled on current medication, will add HCTZ 25 mg and see if that will help. She will RTO for repeat B/P check in 2  months.    Dysthymia  Well controlled on current medication, will refill medication today and as needed. She will RTO for repeat B/P check in 3 months.    Chronic pain syndrome  She is followed by pain management and we are hoping Cymbalta will help.

## 2018-12-18 RX ORDER — DULOXETIN HYDROCHLORIDE 60 MG/1
60 CAPSULE, DELAYED RELEASE ORAL DAILY
Qty: 30 CAPSULE | Refills: 5 | Status: SHIPPED | OUTPATIENT
Start: 2018-12-18 | End: 2019-01-09 | Stop reason: SDUPTHER

## 2019-01-09 DIAGNOSIS — N39.41 URGE INCONTINENCE OF URINE: ICD-10-CM

## 2019-01-09 RX ORDER — OXYBUTYNIN CHLORIDE 5 MG/1
5 TABLET, EXTENDED RELEASE ORAL DAILY
Qty: 90 TABLET | Refills: 1 | Status: SHIPPED | OUTPATIENT
Start: 2019-01-09 | End: 2019-03-02 | Stop reason: HOSPADM

## 2019-01-09 RX ORDER — DULOXETIN HYDROCHLORIDE 60 MG/1
60 CAPSULE, DELAYED RELEASE ORAL DAILY
Qty: 90 CAPSULE | Refills: 1 | Status: SHIPPED | OUTPATIENT
Start: 2019-01-09 | End: 2019-05-09 | Stop reason: SDUPTHER

## 2019-01-09 RX ORDER — LEVOTHYROXINE SODIUM 0.12 MG/1
125 TABLET ORAL DAILY
Qty: 90 TABLET | Refills: 1 | Status: SHIPPED | OUTPATIENT
Start: 2019-01-09 | End: 2019-07-14 | Stop reason: SDUPTHER

## 2019-01-17 ENCOUNTER — OFFICE VISIT (OUTPATIENT)
Dept: FAMILY MEDICINE CLINIC | Facility: CLINIC | Age: 59
End: 2019-01-17

## 2019-01-17 VITALS
SYSTOLIC BLOOD PRESSURE: 124 MMHG | OXYGEN SATURATION: 99 % | RESPIRATION RATE: 16 BRPM | BODY MASS INDEX: 43.59 KG/M2 | HEART RATE: 94 BPM | DIASTOLIC BLOOD PRESSURE: 60 MMHG | WEIGHT: 246 LBS | HEIGHT: 63 IN

## 2019-01-17 DIAGNOSIS — R10.84 ABDOMINAL DISCOMFORT, GENERALIZED: ICD-10-CM

## 2019-01-17 DIAGNOSIS — R19.7 DIARRHEA, UNSPECIFIED TYPE: ICD-10-CM

## 2019-01-17 DIAGNOSIS — R11.2 NON-INTRACTABLE VOMITING WITH NAUSEA, UNSPECIFIED VOMITING TYPE: ICD-10-CM

## 2019-01-17 DIAGNOSIS — I71.40 ABDOMINAL AORTIC ANEURYSM (AAA) WITHOUT RUPTURE (HCC): Primary | ICD-10-CM

## 2019-01-17 PROCEDURE — 99214 OFFICE O/P EST MOD 30 MIN: CPT | Performed by: FAMILY MEDICINE

## 2019-01-17 RX ORDER — METOCLOPRAMIDE HYDROCHLORIDE 5 MG/1
5 TABLET, ORALLY DISINTEGRATING ORAL
Qty: 30 TABLET | Refills: 1 | Status: SHIPPED | OUTPATIENT
Start: 2019-01-17 | End: 2019-02-26 | Stop reason: HOSPADM

## 2019-01-17 NOTE — PROGRESS NOTES
Subjective   Sandra Kellogg is a 58 y.o. female.     History of Present Illness   Sandra is here w/ several issues.  Her pain mgmt md ordered some imaging in preparation for epidural blocks.  She states an AAA was found and she is very concerned.  Sandra is also have significant pain in her abd, n/v,diarrhea and weight loss.  She states she has episodes much like this about once a year. She has seen Dr Triplett in the past but nothing was found.  She has lost 12# in about 6 weeks.  Sandra is here today to discuss.She is not eating much. She has been told that she has gastroparesis. She has never tried Reglan. She is on opioids for pain     The following portions of the patient's history were reviewed and updated as appropriate: allergies, current medications, past medical history, past social history, past surgical history and problem list.    Review of Systems   Constitutional: Positive for appetite change and unexpected weight change.   HENT: Negative.    Eyes: Negative.    Respiratory: Negative.    Cardiovascular: Negative.    Gastrointestinal: Positive for abdominal pain, diarrhea, nausea and vomiting.   Endocrine: Negative.    Genitourinary: Negative.    Musculoskeletal: Positive for arthralgias and back pain.   Allergic/Immunologic: Negative.    Neurological: Negative.    Psychiatric/Behavioral: Negative.        Objective   Physical Exam   Constitutional: She is oriented to person, place, and time. She appears well-developed.   HENT:   Head: Normocephalic and atraumatic.   Eyes: EOM are normal. Pupils are equal, round, and reactive to light.   Cardiovascular: Normal rate and regular rhythm.   Pulmonary/Chest: Effort normal.   Abdominal: Soft. Bowel sounds are normal. She exhibits no distension and no mass. There is no tenderness. There is no rebound and no guarding. No hernia.   Neurological: She is alert and oriented to person, place, and time.   Skin: Skin is warm and dry.   Psychiatric: She has a normal mood and affect.  Her behavior is normal. Judgment and thought content normal.   Nursing note and vitals reviewed.      Assessment/Plan   Sandra was seen today for gi problem.    Diagnoses and all orders for this visit:    Abdominal aortic aneurysm (AAA) without rupture (CMS/HCC)  -     US Abdomen Complete; Future    Non-intractable vomiting with nausea, unspecified vomiting type  -     metoclopramide (METOZOLV) 5 MG tablet dispersible dispersable tablet; Take 1 tablet by mouth 3 (Three) Times a Day Before Meals.  -     Ambulatory Referral to Gastroenterology    Abdominal discomfort, generalized  -     metoclopramide (METOZOLV) 5 MG tablet dispersible dispersable tablet; Take 1 tablet by mouth 3 (Three) Times a Day Before Meals.  -     Ambulatory Referral to Gastroenterology    Diarrhea, unspecified type  -     Ambulatory Referral to Gastroenterology    I have started her on Reglan to see if that will help and referred her to gastro for evalaution

## 2019-01-23 ENCOUNTER — APPOINTMENT (OUTPATIENT)
Dept: CT IMAGING | Facility: HOSPITAL | Age: 59
End: 2019-01-23

## 2019-01-23 ENCOUNTER — HOSPITAL ENCOUNTER (INPATIENT)
Facility: HOSPITAL | Age: 59
LOS: 5 days | Discharge: HOME | End: 2019-01-29
Attending: EMERGENCY MEDICINE | Admitting: HOSPITALIST

## 2019-01-23 DIAGNOSIS — N28.9 ACUTE KIDNEY INSUFFICIENCY: Primary | ICD-10-CM

## 2019-01-23 DIAGNOSIS — R19.7 NAUSEA VOMITING AND DIARRHEA: ICD-10-CM

## 2019-01-23 DIAGNOSIS — E86.0 DEHYDRATION: ICD-10-CM

## 2019-01-23 DIAGNOSIS — R11.2 NAUSEA VOMITING AND DIARRHEA: ICD-10-CM

## 2019-01-23 LAB
ALBUMIN SERPL-MCNC: 4.6 G/DL (ref 3.5–5.2)
ALBUMIN/GLOB SERPL: 1.4 G/DL
ALP SERPL-CCNC: 63 U/L (ref 39–117)
ALT SERPL W P-5'-P-CCNC: 29 U/L (ref 1–33)
ANION GAP SERPL CALCULATED.3IONS-SCNC: 20.5 MMOL/L
AST SERPL-CCNC: 22 U/L (ref 1–32)
BACTERIA UR QL AUTO: ABNORMAL /HPF
BASOPHILS # BLD AUTO: 0.03 10*3/MM3 (ref 0–0.2)
BASOPHILS NFR BLD AUTO: 0.4 % (ref 0–1.5)
BILIRUB SERPL-MCNC: 0.4 MG/DL (ref 0.1–1.2)
BILIRUB UR QL STRIP: NEGATIVE
BUN BLD-MCNC: 60 MG/DL (ref 6–20)
BUN/CREAT SERPL: 21.7 (ref 7–25)
CALCIUM SPEC-SCNC: 10.3 MG/DL (ref 8.6–10.5)
CHLORIDE SERPL-SCNC: 100 MMOL/L (ref 98–107)
CLARITY UR: ABNORMAL
CO2 SERPL-SCNC: 17.5 MMOL/L (ref 22–29)
COLOR UR: YELLOW
CREAT BLD-MCNC: 2.77 MG/DL (ref 0.57–1)
DEPRECATED RDW RBC AUTO: 43.6 FL (ref 37–54)
EOSINOPHIL # BLD AUTO: 0.17 10*3/MM3 (ref 0–0.7)
EOSINOPHIL NFR BLD AUTO: 2 % (ref 0.3–6.2)
ERYTHROCYTE [DISTWIDTH] IN BLOOD BY AUTOMATED COUNT: 12.5 % (ref 11.7–13)
GFR SERPL CREATININE-BSD FRML MDRD: 18 ML/MIN/1.73
GLOBULIN UR ELPH-MCNC: 3.3 GM/DL
GLUCOSE BLD-MCNC: 143 MG/DL (ref 65–99)
GLUCOSE UR STRIP-MCNC: NEGATIVE MG/DL
HCT VFR BLD AUTO: 43.3 % (ref 35.6–45.5)
HGB BLD-MCNC: 14.4 G/DL (ref 11.9–15.5)
HGB UR QL STRIP.AUTO: NEGATIVE
HYALINE CASTS UR QL AUTO: ABNORMAL /LPF
IMM GRANULOCYTES # BLD AUTO: 0.02 10*3/MM3 (ref 0–0.03)
IMM GRANULOCYTES NFR BLD AUTO: 0.2 % (ref 0–0.5)
KETONES UR QL STRIP: ABNORMAL
LEUKOCYTE ESTERASE UR QL STRIP.AUTO: ABNORMAL
LIPASE SERPL-CCNC: 69 U/L (ref 13–60)
LYMPHOCYTES # BLD AUTO: 1.68 10*3/MM3 (ref 0.9–4.8)
LYMPHOCYTES NFR BLD AUTO: 20.2 % (ref 19.6–45.3)
MCH RBC QN AUTO: 31.9 PG (ref 26.9–32)
MCHC RBC AUTO-ENTMCNC: 33.3 G/DL (ref 32.4–36.3)
MCV RBC AUTO: 96 FL (ref 80.5–98.2)
MONOCYTES # BLD AUTO: 0.6 10*3/MM3 (ref 0.2–1.2)
MONOCYTES NFR BLD AUTO: 7.2 % (ref 5–12)
NEUTROPHILS # BLD AUTO: 5.83 10*3/MM3 (ref 1.9–8.1)
NEUTROPHILS NFR BLD AUTO: 70 % (ref 42.7–76)
NITRITE UR QL STRIP: NEGATIVE
PH UR STRIP.AUTO: <=5 [PH] (ref 5–8)
PLATELET # BLD AUTO: 374 10*3/MM3 (ref 140–500)
PMV BLD AUTO: 9.1 FL (ref 6–12)
POTASSIUM BLD-SCNC: 4.4 MMOL/L (ref 3.5–5.2)
PROT SERPL-MCNC: 7.9 G/DL (ref 6–8.5)
PROT UR QL STRIP: ABNORMAL
RBC # BLD AUTO: 4.51 10*6/MM3 (ref 3.9–5.2)
RBC # UR: ABNORMAL /HPF
REF LAB TEST METHOD: ABNORMAL
SODIUM BLD-SCNC: 138 MMOL/L (ref 136–145)
SP GR UR STRIP: 1.02 (ref 1–1.03)
SQUAMOUS #/AREA URNS HPF: ABNORMAL /HPF
UROBILINOGEN UR QL STRIP: ABNORMAL
WBC NRBC COR # BLD: 8.33 10*3/MM3 (ref 4.5–10.7)
WBC UR QL AUTO: ABNORMAL /HPF

## 2019-01-23 PROCEDURE — 74176 CT ABD & PELVIS W/O CONTRAST: CPT

## 2019-01-23 PROCEDURE — 25010000002 ONDANSETRON PER 1 MG: Performed by: NURSE PRACTITIONER

## 2019-01-23 PROCEDURE — 83690 ASSAY OF LIPASE: CPT | Performed by: NURSE PRACTITIONER

## 2019-01-23 PROCEDURE — 99284 EMERGENCY DEPT VISIT MOD MDM: CPT

## 2019-01-23 PROCEDURE — 36415 COLL VENOUS BLD VENIPUNCTURE: CPT

## 2019-01-23 PROCEDURE — 85025 COMPLETE CBC W/AUTO DIFF WBC: CPT | Performed by: NURSE PRACTITIONER

## 2019-01-23 PROCEDURE — 25010000002 MORPHINE (PF) 10 MG/ML SOLUTION: Performed by: NURSE PRACTITIONER

## 2019-01-23 PROCEDURE — 81001 URINALYSIS AUTO W/SCOPE: CPT | Performed by: NURSE PRACTITIONER

## 2019-01-23 PROCEDURE — 80053 COMPREHEN METABOLIC PANEL: CPT | Performed by: NURSE PRACTITIONER

## 2019-01-23 RX ORDER — ONDANSETRON 2 MG/ML
4 INJECTION INTRAMUSCULAR; INTRAVENOUS ONCE
Status: COMPLETED | OUTPATIENT
Start: 2019-01-23 | End: 2019-01-23

## 2019-01-23 RX ORDER — MORPHINE SULFATE 2 MG/ML
4 INJECTION, SOLUTION INTRAMUSCULAR; INTRAVENOUS ONCE
Status: COMPLETED | OUTPATIENT
Start: 2019-01-23 | End: 2019-01-23

## 2019-01-23 RX ADMIN — ONDANSETRON 4 MG: 2 INJECTION INTRAMUSCULAR; INTRAVENOUS at 20:50

## 2019-01-23 RX ADMIN — SODIUM CHLORIDE 1000 ML: 9 INJECTION, SOLUTION INTRAVENOUS at 20:50

## 2019-01-23 RX ADMIN — SODIUM CHLORIDE 1000 ML: 9 INJECTION, SOLUTION INTRAVENOUS at 22:37

## 2019-01-23 RX ADMIN — MORPHINE SULFATE 4 MG: 10 INJECTION INTRAVENOUS at 20:50

## 2019-01-24 ENCOUNTER — APPOINTMENT (OUTPATIENT)
Dept: CT IMAGING | Facility: HOSPITAL | Age: 59
End: 2019-01-24

## 2019-01-24 PROBLEM — I10 HTN (HYPERTENSION): Status: ACTIVE | Noted: 2019-01-24

## 2019-01-24 PROBLEM — I25.10 CAD (CORONARY ARTERY DISEASE): Status: ACTIVE | Noted: 2019-01-24

## 2019-01-24 PROBLEM — R11.2 NAUSEA VOMITING AND DIARRHEA: Status: ACTIVE | Noted: 2019-01-24

## 2019-01-24 PROBLEM — R19.7 NAUSEA VOMITING AND DIARRHEA: Status: ACTIVE | Noted: 2019-01-24

## 2019-01-24 PROBLEM — E86.0 DEHYDRATION: Status: ACTIVE | Noted: 2019-01-24

## 2019-01-24 PROBLEM — G89.29 CHRONIC PAIN: Status: ACTIVE | Noted: 2019-01-24

## 2019-01-24 PROBLEM — Z79.899 POLYPHARMACY: Status: ACTIVE | Noted: 2019-01-24

## 2019-01-24 LAB
ANION GAP SERPL CALCULATED.3IONS-SCNC: 10.9 MMOL/L
BILIRUB UR QL STRIP: NEGATIVE
BUN BLD-MCNC: 42 MG/DL (ref 6–20)
BUN/CREAT SERPL: 26.4 (ref 7–25)
CALCIUM SPEC-SCNC: 7.8 MG/DL (ref 8.6–10.5)
CHLORIDE SERPL-SCNC: 111 MMOL/L (ref 98–107)
CLARITY UR: CLEAR
CO2 SERPL-SCNC: 18.1 MMOL/L (ref 22–29)
COLOR UR: YELLOW
CREAT BLD-MCNC: 1.59 MG/DL (ref 0.57–1)
DEPRECATED RDW RBC AUTO: 44.4 FL (ref 37–54)
DEPRECATED RDW RBC AUTO: 48.3 FL (ref 37–54)
ERYTHROCYTE [DISTWIDTH] IN BLOOD BY AUTOMATED COUNT: 12.7 % (ref 11.7–13)
ERYTHROCYTE [DISTWIDTH] IN BLOOD BY AUTOMATED COUNT: 13.1 % (ref 11.7–13)
GFR SERPL CREATININE-BSD FRML MDRD: 33 ML/MIN/1.73
GLUCOSE BLD-MCNC: 105 MG/DL (ref 65–99)
GLUCOSE BLDC GLUCOMTR-MCNC: 118 MG/DL (ref 70–130)
GLUCOSE UR STRIP-MCNC: NEGATIVE MG/DL
HBA1C MFR BLD: 5.7 % (ref 4.8–5.6)
HCT VFR BLD AUTO: 24.8 % (ref 35.6–45.5)
HCT VFR BLD AUTO: 38.4 % (ref 35.6–45.5)
HGB BLD-MCNC: 12.7 G/DL (ref 11.9–15.5)
HGB BLD-MCNC: 8.1 G/DL (ref 11.9–15.5)
HGB UR QL STRIP.AUTO: NEGATIVE
KETONES UR QL STRIP: NEGATIVE
LEUKOCYTE ESTERASE UR QL STRIP.AUTO: NEGATIVE
MAGNESIUM SERPL-MCNC: 2.4 MG/DL (ref 1.6–2.6)
MCH RBC QN AUTO: 31.9 PG (ref 26.9–32)
MCH RBC QN AUTO: 32.5 PG (ref 26.9–32)
MCHC RBC AUTO-ENTMCNC: 32.7 G/DL (ref 32.4–36.3)
MCHC RBC AUTO-ENTMCNC: 33.1 G/DL (ref 32.4–36.3)
MCV RBC AUTO: 96.5 FL (ref 80.5–98.2)
MCV RBC AUTO: 99.6 FL (ref 80.5–98.2)
NITRITE UR QL STRIP: NEGATIVE
PH UR STRIP.AUTO: <=5 [PH] (ref 5–8)
PHOSPHATE SERPL-MCNC: 4.1 MG/DL (ref 2.5–4.5)
PLATELET # BLD AUTO: 173 10*3/MM3 (ref 140–500)
PLATELET # BLD AUTO: 303 10*3/MM3 (ref 140–500)
PMV BLD AUTO: 8.6 FL (ref 6–12)
PMV BLD AUTO: 9 FL (ref 6–12)
POTASSIUM BLD-SCNC: 4.6 MMOL/L (ref 3.5–5.2)
PROT UR QL STRIP: NEGATIVE
RBC # BLD AUTO: 2.49 10*6/MM3 (ref 3.9–5.2)
RBC # BLD AUTO: 3.98 10*6/MM3 (ref 3.9–5.2)
SODIUM BLD-SCNC: 140 MMOL/L (ref 136–145)
SP GR UR STRIP: 1.02 (ref 1–1.03)
TROPONIN T SERPL-MCNC: <0.01 NG/ML (ref 0–0.03)
TSH SERPL DL<=0.05 MIU/L-ACNC: 1.2 MIU/ML (ref 0.27–4.2)
UROBILINOGEN UR QL STRIP: NORMAL
WBC NRBC COR # BLD: 4.6 10*3/MM3 (ref 4.5–10.7)
WBC NRBC COR # BLD: 9.31 10*3/MM3 (ref 4.5–10.7)

## 2019-01-24 PROCEDURE — 82962 GLUCOSE BLOOD TEST: CPT

## 2019-01-24 PROCEDURE — 83516 IMMUNOASSAY NONANTIBODY: CPT | Performed by: INTERNAL MEDICINE

## 2019-01-24 PROCEDURE — 84484 ASSAY OF TROPONIN QUANT: CPT | Performed by: INTERNAL MEDICINE

## 2019-01-24 PROCEDURE — 93010 ELECTROCARDIOGRAM REPORT: CPT | Performed by: INTERNAL MEDICINE

## 2019-01-24 PROCEDURE — 83735 ASSAY OF MAGNESIUM: CPT | Performed by: HOSPITALIST

## 2019-01-24 PROCEDURE — 83036 HEMOGLOBIN GLYCOSYLATED A1C: CPT | Performed by: HOSPITALIST

## 2019-01-24 PROCEDURE — 86900 BLOOD TYPING SEROLOGIC ABO: CPT

## 2019-01-24 PROCEDURE — 85027 COMPLETE CBC AUTOMATED: CPT | Performed by: HOSPITALIST

## 2019-01-24 PROCEDURE — 82784 ASSAY IGA/IGD/IGG/IGM EACH: CPT | Performed by: INTERNAL MEDICINE

## 2019-01-24 PROCEDURE — 84100 ASSAY OF PHOSPHORUS: CPT | Performed by: HOSPITALIST

## 2019-01-24 PROCEDURE — 85027 COMPLETE CBC AUTOMATED: CPT | Performed by: INTERNAL MEDICINE

## 2019-01-24 PROCEDURE — 80048 BASIC METABOLIC PNL TOTAL CA: CPT | Performed by: INTERNAL MEDICINE

## 2019-01-24 PROCEDURE — 99254 IP/OBS CNSLTJ NEW/EST MOD 60: CPT | Performed by: INTERNAL MEDICINE

## 2019-01-24 PROCEDURE — 86901 BLOOD TYPING SEROLOGIC RH(D): CPT

## 2019-01-24 PROCEDURE — 74176 CT ABD & PELVIS W/O CONTRAST: CPT

## 2019-01-24 PROCEDURE — 93005 ELECTROCARDIOGRAM TRACING: CPT | Performed by: INTERNAL MEDICINE

## 2019-01-24 PROCEDURE — 81003 URINALYSIS AUTO W/O SCOPE: CPT | Performed by: HOSPITALIST

## 2019-01-24 PROCEDURE — 84443 ASSAY THYROID STIM HORMONE: CPT | Performed by: HOSPITALIST

## 2019-01-24 RX ORDER — SODIUM CHLORIDE 0.9 % (FLUSH) 0.9 %
3 SYRINGE (ML) INJECTION EVERY 12 HOURS SCHEDULED
Status: DISCONTINUED | OUTPATIENT
Start: 2019-01-24 | End: 2019-01-29 | Stop reason: HOSPADM

## 2019-01-24 RX ORDER — PANTOPRAZOLE SODIUM 40 MG/1
40 TABLET, DELAYED RELEASE ORAL
Status: DISCONTINUED | OUTPATIENT
Start: 2019-01-24 | End: 2019-01-29 | Stop reason: HOSPADM

## 2019-01-24 RX ORDER — METOPROLOL SUCCINATE 50 MG/1
50 TABLET, EXTENDED RELEASE ORAL DAILY
Status: DISCONTINUED | OUTPATIENT
Start: 2019-01-24 | End: 2019-01-24

## 2019-01-24 RX ORDER — OXYBUTYNIN CHLORIDE 5 MG/1
5 TABLET, EXTENDED RELEASE ORAL DAILY
Status: DISCONTINUED | OUTPATIENT
Start: 2019-01-24 | End: 2019-01-29 | Stop reason: HOSPADM

## 2019-01-24 RX ORDER — TIZANIDINE 4 MG/1
4 TABLET ORAL 3 TIMES DAILY
Status: DISCONTINUED | OUTPATIENT
Start: 2019-01-24 | End: 2019-01-29 | Stop reason: HOSPADM

## 2019-01-24 RX ORDER — SODIUM CHLORIDE, SODIUM LACTATE, POTASSIUM CHLORIDE, CALCIUM CHLORIDE 600; 310; 30; 20 MG/100ML; MG/100ML; MG/100ML; MG/100ML
100 INJECTION, SOLUTION INTRAVENOUS CONTINUOUS
Status: DISCONTINUED | OUTPATIENT
Start: 2019-01-24 | End: 2019-01-26

## 2019-01-24 RX ORDER — ONDANSETRON 4 MG/1
4 TABLET, ORALLY DISINTEGRATING ORAL EVERY 6 HOURS PRN
Status: DISCONTINUED | OUTPATIENT
Start: 2019-01-24 | End: 2019-01-29 | Stop reason: HOSPADM

## 2019-01-24 RX ORDER — ONDANSETRON 2 MG/ML
4 INJECTION INTRAMUSCULAR; INTRAVENOUS EVERY 6 HOURS PRN
Status: DISCONTINUED | OUTPATIENT
Start: 2019-01-24 | End: 2019-01-29 | Stop reason: HOSPADM

## 2019-01-24 RX ORDER — OMEGA-3S/DHA/EPA/FISH OIL/D3 300MG-1000
400 CAPSULE ORAL DAILY
Status: DISCONTINUED | OUTPATIENT
Start: 2019-01-24 | End: 2019-01-29 | Stop reason: HOSPADM

## 2019-01-24 RX ORDER — AMLODIPINE BESYLATE 10 MG/1
10 TABLET ORAL DAILY
Status: DISCONTINUED | OUTPATIENT
Start: 2019-01-24 | End: 2019-01-24

## 2019-01-24 RX ORDER — LEVOTHYROXINE SODIUM 0.12 MG/1
125 TABLET ORAL DAILY
Status: DISCONTINUED | OUTPATIENT
Start: 2019-01-24 | End: 2019-01-29 | Stop reason: HOSPADM

## 2019-01-24 RX ORDER — SODIUM CHLORIDE 0.9 % (FLUSH) 0.9 %
3-10 SYRINGE (ML) INJECTION AS NEEDED
Status: DISCONTINUED | OUTPATIENT
Start: 2019-01-24 | End: 2019-01-29 | Stop reason: HOSPADM

## 2019-01-24 RX ORDER — ZOLPIDEM TARTRATE 5 MG/1
10 TABLET ORAL NIGHTLY PRN
Status: DISCONTINUED | OUTPATIENT
Start: 2019-01-24 | End: 2019-01-29 | Stop reason: HOSPADM

## 2019-01-24 RX ORDER — ASPIRIN 81 MG/1
81 TABLET ORAL DAILY
Status: DISCONTINUED | OUTPATIENT
Start: 2019-01-25 | End: 2019-01-28

## 2019-01-24 RX ORDER — SODIUM CHLORIDE 9 MG/ML
150 INJECTION, SOLUTION INTRAVENOUS CONTINUOUS
Status: DISCONTINUED | OUTPATIENT
Start: 2019-01-24 | End: 2019-01-24

## 2019-01-24 RX ORDER — ROSUVASTATIN CALCIUM 20 MG/1
20 TABLET, COATED ORAL DAILY
Status: DISCONTINUED | OUTPATIENT
Start: 2019-01-24 | End: 2019-01-29 | Stop reason: HOSPADM

## 2019-01-24 RX ORDER — CALCIUM GLUCONATE 94 MG/ML
2 INJECTION, SOLUTION INTRAVENOUS ONCE
Status: COMPLETED | OUTPATIENT
Start: 2019-01-25 | End: 2019-01-24

## 2019-01-24 RX ORDER — HYDROCODONE BITARTRATE AND ACETAMINOPHEN 5; 325 MG/1; MG/1
1 TABLET ORAL EVERY 4 HOURS PRN
Status: DISCONTINUED | OUTPATIENT
Start: 2019-01-24 | End: 2019-01-29 | Stop reason: HOSPADM

## 2019-01-24 RX ORDER — ACETAMINOPHEN 325 MG/1
650 TABLET ORAL EVERY 4 HOURS PRN
Status: DISCONTINUED | OUTPATIENT
Start: 2019-01-24 | End: 2019-01-29 | Stop reason: HOSPADM

## 2019-01-24 RX ORDER — ISOSORBIDE MONONITRATE 60 MG/1
60 TABLET, EXTENDED RELEASE ORAL DAILY
Status: DISCONTINUED | OUTPATIENT
Start: 2019-01-24 | End: 2019-01-25

## 2019-01-24 RX ORDER — ONDANSETRON 4 MG/1
4 TABLET, FILM COATED ORAL EVERY 6 HOURS PRN
Status: DISCONTINUED | OUTPATIENT
Start: 2019-01-24 | End: 2019-01-29 | Stop reason: HOSPADM

## 2019-01-24 RX ORDER — METOCLOPRAMIDE 5 MG/1
5 TABLET ORAL
Status: DISCONTINUED | OUTPATIENT
Start: 2019-01-24 | End: 2019-01-24

## 2019-01-24 RX ORDER — DULOXETIN HYDROCHLORIDE 60 MG/1
60 CAPSULE, DELAYED RELEASE ORAL DAILY
Status: DISCONTINUED | OUTPATIENT
Start: 2019-01-24 | End: 2019-01-29 | Stop reason: HOSPADM

## 2019-01-24 RX ADMIN — TIZANIDINE 4 MG: 4 TABLET ORAL at 09:32

## 2019-01-24 RX ADMIN — SODIUM CHLORIDE, PRESERVATIVE FREE 3 ML: 5 INJECTION INTRAVENOUS at 09:32

## 2019-01-24 RX ADMIN — LEVOTHYROXINE SODIUM 125 MCG: 125 TABLET ORAL at 09:31

## 2019-01-24 RX ADMIN — TIZANIDINE 4 MG: 4 TABLET ORAL at 17:42

## 2019-01-24 RX ADMIN — DULOXETINE HYDROCHLORIDE 60 MG: 60 CAPSULE, DELAYED RELEASE ORAL at 09:31

## 2019-01-24 RX ADMIN — SODIUM CHLORIDE 150 ML/HR: 9 INJECTION, SOLUTION INTRAVENOUS at 09:33

## 2019-01-24 RX ADMIN — SODIUM CHLORIDE 150 ML/HR: 9 INJECTION, SOLUTION INTRAVENOUS at 02:56

## 2019-01-24 RX ADMIN — SODIUM CHLORIDE 1000 ML: 9 INJECTION, SOLUTION INTRAVENOUS at 12:52

## 2019-01-24 RX ADMIN — TIZANIDINE 4 MG: 4 TABLET ORAL at 20:10

## 2019-01-24 RX ADMIN — ISOSORBIDE MONONITRATE 60 MG: 60 TABLET ORAL at 09:31

## 2019-01-24 RX ADMIN — ROSUVASTATIN CALCIUM 20 MG: 20 TABLET, FILM COATED ORAL at 09:32

## 2019-01-24 RX ADMIN — METOPROLOL SUCCINATE 50 MG: 50 TABLET, FILM COATED, EXTENDED RELEASE ORAL at 09:31

## 2019-01-24 RX ADMIN — SODIUM CHLORIDE 1000 ML: 9 INJECTION, SOLUTION INTRAVENOUS at 22:06

## 2019-01-24 RX ADMIN — PANTOPRAZOLE SODIUM 40 MG: 40 TABLET, DELAYED RELEASE ORAL at 17:42

## 2019-01-24 RX ADMIN — DIATRIZOATE MEGLUMINE AND DIATRIZOATE SODIUM 30 ML: 600; 100 SOLUTION ORAL; RECTAL at 23:33

## 2019-01-24 RX ADMIN — CHOLECALCIFEROL TAB 10 MCG (400 UNIT) 400 UNITS: 10 TAB at 09:31

## 2019-01-24 RX ADMIN — SODIUM CHLORIDE, POTASSIUM CHLORIDE, SODIUM LACTATE AND CALCIUM CHLORIDE 200 ML/HR: 600; 310; 30; 20 INJECTION, SOLUTION INTRAVENOUS at 22:53

## 2019-01-24 RX ADMIN — CALCIUM GLUCONATE 2 G: 98 INJECTION, SOLUTION INTRAVENOUS at 23:32

## 2019-01-24 RX ADMIN — PANTOPRAZOLE SODIUM 40 MG: 40 TABLET, DELAYED RELEASE ORAL at 09:31

## 2019-01-24 RX ADMIN — AMLODIPINE BESYLATE 10 MG: 10 TABLET ORAL at 09:32

## 2019-01-24 RX ADMIN — OXYBUTYNIN CHLORIDE 5 MG: 5 TABLET, EXTENDED RELEASE ORAL at 09:31

## 2019-01-24 RX ADMIN — SODIUM CHLORIDE, PRESERVATIVE FREE 3 ML: 5 INJECTION INTRAVENOUS at 20:08

## 2019-01-24 RX ADMIN — SODIUM CHLORIDE 1000 ML: 9 INJECTION, SOLUTION INTRAVENOUS at 20:07

## 2019-01-25 ENCOUNTER — APPOINTMENT (OUTPATIENT)
Dept: ULTRASOUND IMAGING | Facility: HOSPITAL | Age: 59
End: 2019-01-25

## 2019-01-25 ENCOUNTER — APPOINTMENT (OUTPATIENT)
Dept: CT IMAGING | Facility: HOSPITAL | Age: 59
End: 2019-01-25

## 2019-01-25 ENCOUNTER — APPOINTMENT (OUTPATIENT)
Dept: NUCLEAR MEDICINE | Facility: HOSPITAL | Age: 59
End: 2019-01-25

## 2019-01-25 LAB
ABO GROUP BLD: NORMAL
ADV 40+41 DNA STL QL NAA+NON-PROBE: NOT DETECTED
ANION GAP SERPL CALCULATED.3IONS-SCNC: 10.6 MMOL/L
ASTRO TYP 1-8 RNA STL QL NAA+NON-PROBE: NOT DETECTED
BASOPHILS # BLD AUTO: 0.04 10*3/MM3 (ref 0–0.2)
BASOPHILS NFR BLD AUTO: 0.6 % (ref 0–1.5)
BLD GP AB SCN SERPL QL: NEGATIVE
BUN BLD-MCNC: 36 MG/DL (ref 6–20)
BUN/CREAT SERPL: 24.8 (ref 7–25)
C CAYETANENSIS DNA STL QL NAA+NON-PROBE: NOT DETECTED
CALCIUM SPEC-SCNC: 9 MG/DL (ref 8.6–10.5)
CAMPY SP DNA.DIARRHEA STL QL NAA+PROBE: NOT DETECTED
CHLORIDE SERPL-SCNC: 114 MMOL/L (ref 98–107)
CO2 SERPL-SCNC: 17.4 MMOL/L (ref 22–29)
CREAT BLD-MCNC: 1.45 MG/DL (ref 0.57–1)
CRYPTOSP STL CULT: NOT DETECTED
D-LACTATE SERPL-SCNC: 0.9 MMOL/L (ref 0.5–2)
DEPRECATED RDW RBC AUTO: 44.8 FL (ref 37–54)
DEPRECATED RDW RBC AUTO: 47.9 FL (ref 37–54)
DEPRECATED RDW RBC AUTO: 48.9 FL (ref 37–54)
E COLI DNA SPEC QL NAA+PROBE: NOT DETECTED
E HISTOLYT AG STL-ACNC: NOT DETECTED
EAEC PAA PLAS AGGR+AATA ST NAA+NON-PRB: NOT DETECTED
EC STX1 + STX2 GENES STL NAA+PROBE: NOT DETECTED
EOSINOPHIL # BLD AUTO: 0.2 10*3/MM3 (ref 0–0.7)
EOSINOPHIL NFR BLD AUTO: 2.8 % (ref 0.3–6.2)
EPEC EAE GENE STL QL NAA+NON-PROBE: NOT DETECTED
ERYTHROCYTE [DISTWIDTH] IN BLOOD BY AUTOMATED COUNT: 12.8 % (ref 11.7–13)
ERYTHROCYTE [DISTWIDTH] IN BLOOD BY AUTOMATED COUNT: 13.1 % (ref 11.7–13)
ERYTHROCYTE [DISTWIDTH] IN BLOOD BY AUTOMATED COUNT: 13.2 % (ref 11.7–13)
ETEC LTA+ST1A+ST1B TOX ST NAA+NON-PROBE: NOT DETECTED
G LAMBLIA DNA SPEC QL NAA+PROBE: NOT DETECTED
GFR SERPL CREATININE-BSD FRML MDRD: 37 ML/MIN/1.73
GLUCOSE BLD-MCNC: 83 MG/DL (ref 65–99)
HCT VFR BLD AUTO: 30.3 % (ref 35.6–45.5)
HCT VFR BLD AUTO: 31.4 % (ref 35.6–45.5)
HCT VFR BLD AUTO: 36.2 % (ref 35.6–45.5)
HGB BLD-MCNC: 10.1 G/DL (ref 11.9–15.5)
HGB BLD-MCNC: 11.4 G/DL (ref 11.9–15.5)
HGB BLD-MCNC: 9.9 G/DL (ref 11.9–15.5)
IMM GRANULOCYTES # BLD AUTO: 0.03 10*3/MM3 (ref 0–0.03)
IMM GRANULOCYTES NFR BLD AUTO: 0.4 % (ref 0–0.5)
LYMPHOCYTES # BLD AUTO: 2.15 10*3/MM3 (ref 0.9–4.8)
LYMPHOCYTES NFR BLD AUTO: 30.6 % (ref 19.6–45.3)
MCH RBC QN AUTO: 31.5 PG (ref 26.9–32)
MCH RBC QN AUTO: 31.8 PG (ref 26.9–32)
MCH RBC QN AUTO: 32.1 PG (ref 26.9–32)
MCHC RBC AUTO-ENTMCNC: 31.5 G/DL (ref 32.4–36.3)
MCHC RBC AUTO-ENTMCNC: 32.2 G/DL (ref 32.4–36.3)
MCHC RBC AUTO-ENTMCNC: 32.7 G/DL (ref 32.4–36.3)
MCV RBC AUTO: 101.1 FL (ref 80.5–98.2)
MCV RBC AUTO: 96.5 FL (ref 80.5–98.2)
MCV RBC AUTO: 99.7 FL (ref 80.5–98.2)
MONOCYTES # BLD AUTO: 0.43 10*3/MM3 (ref 0.2–1.2)
MONOCYTES NFR BLD AUTO: 6.1 % (ref 5–12)
NEUTROPHILS # BLD AUTO: 4.21 10*3/MM3 (ref 1.9–8.1)
NEUTROPHILS NFR BLD AUTO: 59.9 % (ref 42.7–76)
NOROVIRUS GI+II RNA STL QL NAA+NON-PROBE: NOT DETECTED
P SHIGELLOIDES DNA STL QL NAA+NON-PROBE: NOT DETECTED
PLATELET # BLD AUTO: 218 10*3/MM3 (ref 140–500)
PLATELET # BLD AUTO: 233 10*3/MM3 (ref 140–500)
PLATELET # BLD AUTO: 305 10*3/MM3 (ref 140–500)
PMV BLD AUTO: 8.7 FL (ref 6–12)
PMV BLD AUTO: 8.7 FL (ref 6–12)
PMV BLD AUTO: 9.3 FL (ref 6–12)
POTASSIUM BLD-SCNC: 4.7 MMOL/L (ref 3.5–5.2)
RBC # BLD AUTO: 3.14 10*6/MM3 (ref 3.9–5.2)
RBC # BLD AUTO: 3.15 10*6/MM3 (ref 3.9–5.2)
RBC # BLD AUTO: 3.58 10*6/MM3 (ref 3.9–5.2)
RH BLD: POSITIVE
RV RNA STL NAA+PROBE: NOT DETECTED
SALMONELLA DNA SPEC QL NAA+PROBE: NOT DETECTED
SAPO I+II+IV+V RNA STL QL NAA+NON-PROBE: NOT DETECTED
SHIGELLA SP+EIEC IPAH STL QL NAA+PROBE: NOT DETECTED
SODIUM BLD-SCNC: 142 MMOL/L (ref 136–145)
T&S EXPIRATION DATE: NORMAL
V CHOLERAE DNA SPEC QL NAA+PROBE: NOT DETECTED
VIBRIO DNA SPEC NAA+PROBE: NOT DETECTED
WBC NRBC COR # BLD: 5.6 10*3/MM3 (ref 4.5–10.7)
WBC NRBC COR # BLD: 6.5 10*3/MM3 (ref 4.5–10.7)
WBC NRBC COR # BLD: 7.03 10*3/MM3 (ref 4.5–10.7)
YERSINIA STL CULT: NOT DETECTED

## 2019-01-25 PROCEDURE — 25010000002 CALCIUM GLUCONATE PER 10 ML: Performed by: INTERNAL MEDICINE

## 2019-01-25 PROCEDURE — 86850 RBC ANTIBODY SCREEN: CPT | Performed by: INTERNAL MEDICINE

## 2019-01-25 PROCEDURE — 85027 COMPLETE CBC AUTOMATED: CPT | Performed by: INTERNAL MEDICINE

## 2019-01-25 PROCEDURE — 99232 SBSQ HOSP IP/OBS MODERATE 35: CPT | Performed by: INTERNAL MEDICINE

## 2019-01-25 PROCEDURE — 86901 BLOOD TYPING SEROLOGIC RH(D): CPT | Performed by: INTERNAL MEDICINE

## 2019-01-25 PROCEDURE — 0 DIATRIZOATE MEGLUMINE & SODIUM PER 1 ML: Performed by: INTERNAL MEDICINE

## 2019-01-25 PROCEDURE — 83605 ASSAY OF LACTIC ACID: CPT | Performed by: INTERNAL MEDICINE

## 2019-01-25 PROCEDURE — 86900 BLOOD TYPING SEROLOGIC ABO: CPT | Performed by: INTERNAL MEDICINE

## 2019-01-25 PROCEDURE — 86923 COMPATIBILITY TEST ELECTRIC: CPT

## 2019-01-25 PROCEDURE — 78264 GASTRIC EMPTYING IMG STUDY: CPT

## 2019-01-25 PROCEDURE — 87507 IADNA-DNA/RNA PROBE TQ 12-25: CPT | Performed by: INTERNAL MEDICINE

## 2019-01-25 PROCEDURE — 0 TECHNETIUM SULFUR COLLOID: Performed by: HOSPITALIST

## 2019-01-25 PROCEDURE — 74176 CT ABD & PELVIS W/O CONTRAST: CPT

## 2019-01-25 PROCEDURE — A9541 TC99M SULFUR COLLOID: HCPCS | Performed by: HOSPITALIST

## 2019-01-25 PROCEDURE — 80048 BASIC METABOLIC PNL TOTAL CA: CPT | Performed by: HOSPITALIST

## 2019-01-25 PROCEDURE — 87338 HPYLORI STOOL AG IA: CPT | Performed by: INTERNAL MEDICINE

## 2019-01-25 PROCEDURE — 85025 COMPLETE CBC W/AUTO DIFF WBC: CPT | Performed by: INTERNAL MEDICINE

## 2019-01-25 RX ORDER — ISOSORBIDE DINITRATE 10 MG/1
10 TABLET ORAL
Status: DISCONTINUED | OUTPATIENT
Start: 2019-01-26 | End: 2019-01-29 | Stop reason: HOSPADM

## 2019-01-25 RX ADMIN — TIZANIDINE 4 MG: 4 TABLET ORAL at 21:50

## 2019-01-25 RX ADMIN — PANTOPRAZOLE SODIUM 40 MG: 40 TABLET, DELAYED RELEASE ORAL at 17:16

## 2019-01-25 RX ADMIN — SODIUM CHLORIDE, POTASSIUM CHLORIDE, SODIUM LACTATE AND CALCIUM CHLORIDE 200 ML/HR: 600; 310; 30; 20 INJECTION, SOLUTION INTRAVENOUS at 12:03

## 2019-01-25 RX ADMIN — LEVOTHYROXINE SODIUM 125 MCG: 125 TABLET ORAL at 12:00

## 2019-01-25 RX ADMIN — ROSUVASTATIN CALCIUM 20 MG: 20 TABLET, FILM COATED ORAL at 12:00

## 2019-01-25 RX ADMIN — OXYBUTYNIN CHLORIDE 5 MG: 5 TABLET, EXTENDED RELEASE ORAL at 11:59

## 2019-01-25 RX ADMIN — TECHNETIUM TC 99M SULFUR COLLOID 1 DOSE: KIT at 06:52

## 2019-01-25 RX ADMIN — TIZANIDINE 4 MG: 4 TABLET ORAL at 12:00

## 2019-01-25 RX ADMIN — ASPIRIN 81 MG: 81 TABLET, DELAYED RELEASE ORAL at 12:00

## 2019-01-25 RX ADMIN — ISOSORBIDE MONONITRATE 60 MG: 60 TABLET ORAL at 11:59

## 2019-01-25 RX ADMIN — CHOLECALCIFEROL TAB 10 MCG (400 UNIT) 400 UNITS: 10 TAB at 11:59

## 2019-01-25 RX ADMIN — DULOXETINE HYDROCHLORIDE 60 MG: 60 CAPSULE, DELAYED RELEASE ORAL at 12:00

## 2019-01-25 RX ADMIN — SODIUM CHLORIDE, PRESERVATIVE FREE 3 ML: 5 INJECTION INTRAVENOUS at 22:06

## 2019-01-25 RX ADMIN — SODIUM CHLORIDE, POTASSIUM CHLORIDE, SODIUM LACTATE AND CALCIUM CHLORIDE 100 ML/HR: 600; 310; 30; 20 INJECTION, SOLUTION INTRAVENOUS at 17:52

## 2019-01-25 RX ADMIN — TIZANIDINE 4 MG: 4 TABLET ORAL at 17:16

## 2019-01-25 RX ADMIN — ZOLPIDEM TARTRATE 10 MG: 5 TABLET ORAL at 21:52

## 2019-01-25 RX ADMIN — SODIUM CHLORIDE, POTASSIUM CHLORIDE, SODIUM LACTATE AND CALCIUM CHLORIDE 200 ML/HR: 600; 310; 30; 20 INJECTION, SOLUTION INTRAVENOUS at 04:22

## 2019-01-25 RX ADMIN — PANTOPRAZOLE SODIUM 40 MG: 40 TABLET, DELAYED RELEASE ORAL at 12:02

## 2019-01-25 RX ADMIN — SODIUM CHLORIDE, PRESERVATIVE FREE 3 ML: 5 INJECTION INTRAVENOUS at 12:03

## 2019-01-26 LAB
ANION GAP SERPL CALCULATED.3IONS-SCNC: 11.7 MMOL/L
BUN BLD-MCNC: 21 MG/DL (ref 6–20)
BUN/CREAT SERPL: 15.7 (ref 7–25)
CALCIUM SPEC-SCNC: 9.5 MG/DL (ref 8.6–10.5)
CHLORIDE SERPL-SCNC: 109 MMOL/L (ref 98–107)
CO2 SERPL-SCNC: 20.3 MMOL/L (ref 22–29)
CREAT BLD-MCNC: 1.34 MG/DL (ref 0.57–1)
DEPRECATED RDW RBC AUTO: 44.4 FL (ref 37–54)
ERYTHROCYTE [DISTWIDTH] IN BLOOD BY AUTOMATED COUNT: 12.6 % (ref 11.7–13)
GFR SERPL CREATININE-BSD FRML MDRD: 41 ML/MIN/1.73
GLIADIN PEPTIDE IGA SER-ACNC: 3 UNITS (ref 0–19)
GLIADIN PEPTIDE IGG SER-ACNC: 4 UNITS (ref 0–19)
GLUCOSE BLD-MCNC: 85 MG/DL (ref 65–99)
HCT VFR BLD AUTO: 32.7 % (ref 35.6–45.5)
HGB BLD-MCNC: 10.6 G/DL (ref 11.9–15.5)
IGA SERPL-MCNC: 214 MG/DL (ref 87–352)
MCH RBC QN AUTO: 31.5 PG (ref 26.9–32)
MCHC RBC AUTO-ENTMCNC: 32.4 G/DL (ref 32.4–36.3)
MCV RBC AUTO: 97.3 FL (ref 80.5–98.2)
PLATELET # BLD AUTO: 268 10*3/MM3 (ref 140–500)
PMV BLD AUTO: 9.1 FL (ref 6–12)
POTASSIUM BLD-SCNC: 4.3 MMOL/L (ref 3.5–5.2)
RBC # BLD AUTO: 3.36 10*6/MM3 (ref 3.9–5.2)
SODIUM BLD-SCNC: 141 MMOL/L (ref 136–145)
TTG IGA SER-ACNC: <2 U/ML (ref 0–3)
TTG IGG SER-ACNC: <2 U/ML (ref 0–5)
WBC NRBC COR # BLD: 6.23 10*3/MM3 (ref 4.5–10.7)

## 2019-01-26 PROCEDURE — 99232 SBSQ HOSP IP/OBS MODERATE 35: CPT | Performed by: INTERNAL MEDICINE

## 2019-01-26 PROCEDURE — 85027 COMPLETE CBC AUTOMATED: CPT | Performed by: INTERNAL MEDICINE

## 2019-01-26 PROCEDURE — 80048 BASIC METABOLIC PNL TOTAL CA: CPT | Performed by: HOSPITALIST

## 2019-01-26 RX ORDER — POLYETHYLENE GLYCOL 3350, SODIUM CHLORIDE, POTASSIUM CHLORIDE, SODIUM BICARBONATE, AND SODIUM SULFATE 240; 5.84; 2.98; 6.72; 22.72 G/4L; G/4L; G/4L; G/4L; G/4L
2000 POWDER, FOR SOLUTION ORAL 2 TIMES DAILY
Status: DISPENSED | OUTPATIENT
Start: 2019-01-26 | End: 2019-01-27

## 2019-01-26 RX ADMIN — LEVOTHYROXINE SODIUM 125 MCG: 125 TABLET ORAL at 08:53

## 2019-01-26 RX ADMIN — ISOSORBIDE DINITRATE 10 MG: 10 TABLET ORAL at 08:53

## 2019-01-26 RX ADMIN — SODIUM CHLORIDE, POTASSIUM CHLORIDE, SODIUM LACTATE AND CALCIUM CHLORIDE 100 ML/HR: 600; 310; 30; 20 INJECTION, SOLUTION INTRAVENOUS at 03:51

## 2019-01-26 RX ADMIN — POLYETHYLENE GLYCOL-3350 AND ELECTROLYTES WITH FLAVOR PACK 2000 ML: 240; 5.84; 2.98; 6.72; 22.72 POWDER, FOR SOLUTION ORAL at 17:47

## 2019-01-26 RX ADMIN — DULOXETINE HYDROCHLORIDE 60 MG: 60 CAPSULE, DELAYED RELEASE ORAL at 08:53

## 2019-01-26 RX ADMIN — TIZANIDINE 4 MG: 4 TABLET ORAL at 20:30

## 2019-01-26 RX ADMIN — ZOLPIDEM TARTRATE 10 MG: 5 TABLET ORAL at 23:35

## 2019-01-26 RX ADMIN — ROSUVASTATIN CALCIUM 20 MG: 20 TABLET, FILM COATED ORAL at 08:53

## 2019-01-26 RX ADMIN — TIZANIDINE 4 MG: 4 TABLET ORAL at 17:39

## 2019-01-26 RX ADMIN — SODIUM CHLORIDE, PRESERVATIVE FREE 3 ML: 5 INJECTION INTRAVENOUS at 21:46

## 2019-01-26 RX ADMIN — CHOLECALCIFEROL TAB 10 MCG (400 UNIT) 400 UNITS: 10 TAB at 09:00

## 2019-01-26 RX ADMIN — PANTOPRAZOLE SODIUM 40 MG: 40 TABLET, DELAYED RELEASE ORAL at 08:57

## 2019-01-26 RX ADMIN — ASPIRIN 81 MG: 81 TABLET, DELAYED RELEASE ORAL at 09:00

## 2019-01-26 RX ADMIN — ISOSORBIDE DINITRATE 10 MG: 10 TABLET ORAL at 17:39

## 2019-01-26 RX ADMIN — TIZANIDINE 4 MG: 4 TABLET ORAL at 08:53

## 2019-01-26 RX ADMIN — SODIUM CHLORIDE, PRESERVATIVE FREE 3 ML: 5 INJECTION INTRAVENOUS at 08:57

## 2019-01-26 RX ADMIN — OXYBUTYNIN CHLORIDE 5 MG: 5 TABLET, EXTENDED RELEASE ORAL at 08:53

## 2019-01-26 RX ADMIN — PANTOPRAZOLE SODIUM 40 MG: 40 TABLET, DELAYED RELEASE ORAL at 17:39

## 2019-01-27 ENCOUNTER — ANESTHESIA (OUTPATIENT)
Dept: GASTROENTEROLOGY | Facility: HOSPITAL | Age: 59
End: 2019-01-27

## 2019-01-27 ENCOUNTER — ANESTHESIA EVENT (OUTPATIENT)
Dept: GASTROENTEROLOGY | Facility: HOSPITAL | Age: 59
End: 2019-01-27

## 2019-01-27 LAB
ANION GAP SERPL CALCULATED.3IONS-SCNC: 13.8 MMOL/L
BUN BLD-MCNC: 15 MG/DL (ref 6–20)
BUN/CREAT SERPL: 10.7 (ref 7–25)
CALCIUM SPEC-SCNC: 9.6 MG/DL (ref 8.6–10.5)
CHLORIDE SERPL-SCNC: 106 MMOL/L (ref 98–107)
CO2 SERPL-SCNC: 21.2 MMOL/L (ref 22–29)
CREAT BLD-MCNC: 1.4 MG/DL (ref 0.57–1)
DEPRECATED RDW RBC AUTO: 41.7 FL (ref 37–54)
ERYTHROCYTE [DISTWIDTH] IN BLOOD BY AUTOMATED COUNT: 12.3 % (ref 11.7–13)
GFR SERPL CREATININE-BSD FRML MDRD: 39 ML/MIN/1.73
GLUCOSE BLD-MCNC: 80 MG/DL (ref 65–99)
HCT VFR BLD AUTO: 32 % (ref 35.6–45.5)
HGB BLD-MCNC: 10.7 G/DL (ref 11.9–15.5)
MCH RBC QN AUTO: 31.4 PG (ref 26.9–32)
MCHC RBC AUTO-ENTMCNC: 33.4 G/DL (ref 32.4–36.3)
MCV RBC AUTO: 93.8 FL (ref 80.5–98.2)
PLATELET # BLD AUTO: 235 10*3/MM3 (ref 140–500)
PMV BLD AUTO: 8.7 FL (ref 6–12)
POTASSIUM BLD-SCNC: 3.8 MMOL/L (ref 3.5–5.2)
RBC # BLD AUTO: 3.41 10*6/MM3 (ref 3.9–5.2)
SODIUM BLD-SCNC: 141 MMOL/L (ref 136–145)
WBC NRBC COR # BLD: 5.53 10*3/MM3 (ref 4.5–10.7)

## 2019-01-27 PROCEDURE — 85027 COMPLETE CBC AUTOMATED: CPT | Performed by: INTERNAL MEDICINE

## 2019-01-27 PROCEDURE — 0DBF8ZX EXCISION OF RIGHT LARGE INTESTINE, VIA NATURAL OR ARTIFICIAL OPENING ENDOSCOPIC, DIAGNOSTIC: ICD-10-PCS | Performed by: INTERNAL MEDICINE

## 2019-01-27 PROCEDURE — 25010000002 PROPOFOL 10 MG/ML EMULSION: Performed by: ANESTHESIOLOGY

## 2019-01-27 PROCEDURE — 43239 EGD BIOPSY SINGLE/MULTIPLE: CPT | Performed by: INTERNAL MEDICINE

## 2019-01-27 PROCEDURE — 88342 IMHCHEM/IMCYTCHM 1ST ANTB: CPT | Performed by: INTERNAL MEDICINE

## 2019-01-27 PROCEDURE — 45380 COLONOSCOPY AND BIOPSY: CPT | Performed by: INTERNAL MEDICINE

## 2019-01-27 PROCEDURE — 0DBG8ZX EXCISION OF LEFT LARGE INTESTINE, VIA NATURAL OR ARTIFICIAL OPENING ENDOSCOPIC, DIAGNOSTIC: ICD-10-PCS | Performed by: INTERNAL MEDICINE

## 2019-01-27 PROCEDURE — 88305 TISSUE EXAM BY PATHOLOGIST: CPT | Performed by: INTERNAL MEDICINE

## 2019-01-27 PROCEDURE — 80048 BASIC METABOLIC PNL TOTAL CA: CPT | Performed by: HOSPITALIST

## 2019-01-27 RX ORDER — LIDOCAINE HYDROCHLORIDE 10 MG/ML
0.5 INJECTION, SOLUTION INFILTRATION; PERINEURAL ONCE AS NEEDED
Status: DISCONTINUED | OUTPATIENT
Start: 2019-01-27 | End: 2019-01-28

## 2019-01-27 RX ORDER — PROPOFOL 10 MG/ML
VIAL (ML) INTRAVENOUS AS NEEDED
Status: DISCONTINUED | OUTPATIENT
Start: 2019-01-27 | End: 2019-01-27 | Stop reason: SURG

## 2019-01-27 RX ORDER — LIDOCAINE HYDROCHLORIDE 20 MG/ML
INJECTION, SOLUTION INFILTRATION; PERINEURAL AS NEEDED
Status: DISCONTINUED | OUTPATIENT
Start: 2019-01-27 | End: 2019-01-27 | Stop reason: SURG

## 2019-01-27 RX ORDER — SODIUM CHLORIDE, SODIUM LACTATE, POTASSIUM CHLORIDE, CALCIUM CHLORIDE 600; 310; 30; 20 MG/100ML; MG/100ML; MG/100ML; MG/100ML
1000 INJECTION, SOLUTION INTRAVENOUS CONTINUOUS
Status: ACTIVE | OUTPATIENT
Start: 2019-01-27 | End: 2019-01-29

## 2019-01-27 RX ORDER — SODIUM CHLORIDE 0.9 % (FLUSH) 0.9 %
3 SYRINGE (ML) INJECTION AS NEEDED
Status: DISCONTINUED | OUTPATIENT
Start: 2019-01-27 | End: 2019-01-28

## 2019-01-27 RX ADMIN — TIZANIDINE 4 MG: 4 TABLET ORAL at 22:07

## 2019-01-27 RX ADMIN — ISOSORBIDE DINITRATE 10 MG: 10 TABLET ORAL at 12:39

## 2019-01-27 RX ADMIN — SODIUM CHLORIDE, PRESERVATIVE FREE 3 ML: 5 INJECTION INTRAVENOUS at 20:40

## 2019-01-27 RX ADMIN — CHOLECALCIFEROL TAB 10 MCG (400 UNIT) 400 UNITS: 10 TAB at 12:40

## 2019-01-27 RX ADMIN — LIDOCAINE HYDROCHLORIDE 100 MG: 20 INJECTION, SOLUTION INFILTRATION; PERINEURAL at 09:40

## 2019-01-27 RX ADMIN — DULOXETINE HYDROCHLORIDE 60 MG: 60 CAPSULE, DELAYED RELEASE ORAL at 12:39

## 2019-01-27 RX ADMIN — TIZANIDINE 4 MG: 4 TABLET ORAL at 18:51

## 2019-01-27 RX ADMIN — SODIUM CHLORIDE, POTASSIUM CHLORIDE, SODIUM LACTATE AND CALCIUM CHLORIDE: 600; 310; 30; 20 INJECTION, SOLUTION INTRAVENOUS at 09:40

## 2019-01-27 RX ADMIN — ISOSORBIDE DINITRATE 10 MG: 10 TABLET ORAL at 08:24

## 2019-01-27 RX ADMIN — OXYBUTYNIN CHLORIDE 5 MG: 5 TABLET, EXTENDED RELEASE ORAL at 12:39

## 2019-01-27 RX ADMIN — SODIUM CHLORIDE, POTASSIUM CHLORIDE, SODIUM LACTATE AND CALCIUM CHLORIDE 1000 ML: 600; 310; 30; 20 INJECTION, SOLUTION INTRAVENOUS at 09:17

## 2019-01-27 RX ADMIN — ROSUVASTATIN CALCIUM 20 MG: 20 TABLET, FILM COATED ORAL at 08:24

## 2019-01-27 RX ADMIN — TIZANIDINE 4 MG: 4 TABLET ORAL at 12:39

## 2019-01-27 RX ADMIN — SODIUM CHLORIDE, PRESERVATIVE FREE 3 ML: 5 INJECTION INTRAVENOUS at 14:59

## 2019-01-27 RX ADMIN — LEVOTHYROXINE SODIUM 125 MCG: 125 TABLET ORAL at 12:39

## 2019-01-27 RX ADMIN — ASPIRIN 81 MG: 81 TABLET, DELAYED RELEASE ORAL at 12:41

## 2019-01-27 RX ADMIN — LIDOCAINE HYDROCHLORIDE 15 ML: 20 SOLUTION ORAL; TOPICAL at 09:23

## 2019-01-27 RX ADMIN — ZOLPIDEM TARTRATE 10 MG: 5 TABLET ORAL at 22:07

## 2019-01-27 RX ADMIN — PANTOPRAZOLE SODIUM 40 MG: 40 TABLET, DELAYED RELEASE ORAL at 18:51

## 2019-01-27 RX ADMIN — ISOSORBIDE DINITRATE 10 MG: 10 TABLET ORAL at 18:51

## 2019-01-27 RX ADMIN — PROPOFOL 600 MG: 10 INJECTION, EMULSION INTRAVENOUS at 09:40

## 2019-01-27 NOTE — ANESTHESIA POSTPROCEDURE EVALUATION
"Patient: Sandra Kellogg    Procedure Summary     Date:  01/27/19 Room / Location:   [facility]    Anesthesia Start:  0942 Anesthesia Stop:  1027    Procedures:       ESOPHAGOGASTRODUODENOSCOPY (N/A Esophagus)      COLONOSCOPY INTO CECUM WITH COLD BX POLYPECTOMY (N/A ) Diagnosis:       Nausea vomiting and diarrhea      Duodenitis      Erosive gastritis      Reflux esophagitis      Colon polyp      (Nausea vomiting and diarrhea [R11.2, R19.7])    Surgeon:  Robert Trent MD Provider:  Jordan Zhou MD    Anesthesia Type:  MAC ASA Status:  4          Anesthesia Type: MAC  Last vitals  BP   154/88 (01/27/19 0906)   Temp   36.9 °C (98.4 °F) (01/27/19 0906)   Pulse   69 (01/27/19 0906)   Resp   17 (01/27/19 0906)     SpO2   94 % (01/27/19 0906)     Post Anesthesia Care and Evaluation    Patient location during evaluation: bedside  Patient participation: complete - patient participated  Level of consciousness: awake and alert  Pain management: adequate  Airway patency: patent  Anesthetic complications: No anesthetic complications    Cardiovascular status: acceptable  Respiratory status: acceptable  Hydration status: acceptable    Comments: /88 (BP Location: Left arm, Patient Position: Lying)   Pulse 69   Temp 36.9 °C (98.4 °F) (Oral)   Resp 17   Ht 162.6 cm (64\")   Wt 125 kg (276 lb)   LMP  (LMP Unknown)   SpO2 94%   BMI 47.38 kg/m²       "

## 2019-01-27 NOTE — ANESTHESIA PREPROCEDURE EVALUATION
Anesthesia Evaluation     Patient summary reviewed and Nursing notes reviewed   NPO Solid Status: > 8 hours  NPO Liquid Status: > 8 hours           Airway   Mallampati: II  TM distance: >3 FB  Neck ROM: full  no difficulty expected  Dental - normal exam     Pulmonary - normal exam   Cardiovascular - normal exam    (+) hypertension, past MI , CAD, hyperlipidemia,       Neuro/Psych  (+) psychiatric history Depression,     GI/Hepatic/Renal/Endo    (+) obesity, morbid obesity,  hypothyroidism,     Musculoskeletal     Abdominal  - normal exam   Substance History      OB/GYN          Other   (+) arthritis                     Anesthesia Plan    ASA 4     MAC     Anesthetic plan, all risks, benefits, and alternatives have been provided, discussed and informed consent has been obtained with: patient.

## 2019-01-28 LAB
ANION GAP SERPL CALCULATED.3IONS-SCNC: 13.8 MMOL/L
BUN BLD-MCNC: 15 MG/DL (ref 6–20)
BUN/CREAT SERPL: 12 (ref 7–25)
CALCIUM SPEC-SCNC: 9.2 MG/DL (ref 8.6–10.5)
CHLORIDE SERPL-SCNC: 105 MMOL/L (ref 98–107)
CO2 SERPL-SCNC: 24.2 MMOL/L (ref 22–29)
CREAT BLD-MCNC: 1.25 MG/DL (ref 0.57–1)
DEPRECATED RDW RBC AUTO: 44.1 FL (ref 37–54)
ERYTHROCYTE [DISTWIDTH] IN BLOOD BY AUTOMATED COUNT: 12.4 % (ref 11.7–13)
GFR SERPL CREATININE-BSD FRML MDRD: 44 ML/MIN/1.73
GLUCOSE BLD-MCNC: 157 MG/DL (ref 65–99)
HCT VFR BLD AUTO: 35.9 % (ref 35.6–45.5)
HGB BLD-MCNC: 11.7 G/DL (ref 11.9–15.5)
MCH RBC QN AUTO: 31.8 PG (ref 26.9–32)
MCHC RBC AUTO-ENTMCNC: 32.6 G/DL (ref 32.4–36.3)
MCV RBC AUTO: 97.6 FL (ref 80.5–98.2)
PLATELET # BLD AUTO: 257 10*3/MM3 (ref 140–500)
PMV BLD AUTO: 8.8 FL (ref 6–12)
POTASSIUM BLD-SCNC: 3.4 MMOL/L (ref 3.5–5.2)
RBC # BLD AUTO: 3.68 10*6/MM3 (ref 3.9–5.2)
SODIUM BLD-SCNC: 143 MMOL/L (ref 136–145)
WBC NRBC COR # BLD: 6.9 10*3/MM3 (ref 4.5–10.7)

## 2019-01-28 PROCEDURE — 85027 COMPLETE CBC AUTOMATED: CPT | Performed by: INTERNAL MEDICINE

## 2019-01-28 PROCEDURE — 80048 BASIC METABOLIC PNL TOTAL CA: CPT | Performed by: HOSPITALIST

## 2019-01-28 PROCEDURE — 99232 SBSQ HOSP IP/OBS MODERATE 35: CPT | Performed by: INTERNAL MEDICINE

## 2019-01-28 RX ORDER — METOPROLOL SUCCINATE 50 MG/1
50 TABLET, EXTENDED RELEASE ORAL DAILY
Status: DISCONTINUED | OUTPATIENT
Start: 2019-01-28 | End: 2019-01-29 | Stop reason: HOSPADM

## 2019-01-28 RX ORDER — POTASSIUM CHLORIDE 750 MG/1
40 CAPSULE, EXTENDED RELEASE ORAL ONCE
Status: COMPLETED | OUTPATIENT
Start: 2019-01-28 | End: 2019-01-28

## 2019-01-28 RX ADMIN — SODIUM CHLORIDE, PRESERVATIVE FREE 3 ML: 5 INJECTION INTRAVENOUS at 21:00

## 2019-01-28 RX ADMIN — HYOSCYAMINE SULFATE 125 MCG: 0.12 TABLET, ORALLY DISINTEGRATING ORAL at 19:26

## 2019-01-28 RX ADMIN — POTASSIUM CHLORIDE 40 MEQ: 750 CAPSULE, EXTENDED RELEASE ORAL at 13:34

## 2019-01-28 RX ADMIN — ISOSORBIDE DINITRATE 10 MG: 10 TABLET ORAL at 13:34

## 2019-01-28 RX ADMIN — HYOSCYAMINE SULFATE 125 MCG: 0.12 TABLET, ORALLY DISINTEGRATING ORAL at 09:42

## 2019-01-28 RX ADMIN — SODIUM CHLORIDE, PRESERVATIVE FREE 10 ML: 5 INJECTION INTRAVENOUS at 09:30

## 2019-01-28 RX ADMIN — LEVOTHYROXINE SODIUM 125 MCG: 125 TABLET ORAL at 09:42

## 2019-01-28 RX ADMIN — OXYBUTYNIN CHLORIDE 5 MG: 5 TABLET, EXTENDED RELEASE ORAL at 09:29

## 2019-01-28 RX ADMIN — ISOSORBIDE DINITRATE 10 MG: 10 TABLET ORAL at 09:28

## 2019-01-28 RX ADMIN — METOPROLOL SUCCINATE 50 MG: 50 TABLET, FILM COATED, EXTENDED RELEASE ORAL at 12:45

## 2019-01-28 RX ADMIN — ASPIRIN 81 MG: 81 TABLET, DELAYED RELEASE ORAL at 09:29

## 2019-01-28 RX ADMIN — CHOLECALCIFEROL TAB 10 MCG (400 UNIT) 400 UNITS: 10 TAB at 09:28

## 2019-01-28 RX ADMIN — PANTOPRAZOLE SODIUM 40 MG: 40 TABLET, DELAYED RELEASE ORAL at 17:38

## 2019-01-28 RX ADMIN — ZOLPIDEM TARTRATE 10 MG: 5 TABLET ORAL at 21:59

## 2019-01-28 RX ADMIN — ISOSORBIDE DINITRATE 10 MG: 10 TABLET ORAL at 17:38

## 2019-01-28 RX ADMIN — TIZANIDINE 4 MG: 4 TABLET ORAL at 21:59

## 2019-01-28 RX ADMIN — DULOXETINE HYDROCHLORIDE 60 MG: 60 CAPSULE, DELAYED RELEASE ORAL at 09:28

## 2019-01-28 RX ADMIN — TIZANIDINE 4 MG: 4 TABLET ORAL at 09:28

## 2019-01-28 RX ADMIN — PANTOPRAZOLE SODIUM 40 MG: 40 TABLET, DELAYED RELEASE ORAL at 06:30

## 2019-01-28 RX ADMIN — TIZANIDINE 4 MG: 4 TABLET ORAL at 17:38

## 2019-01-28 RX ADMIN — SODIUM CHLORIDE, PRESERVATIVE FREE 10 ML: 5 INJECTION INTRAVENOUS at 09:29

## 2019-01-28 RX ADMIN — ROSUVASTATIN CALCIUM 20 MG: 20 TABLET, FILM COATED ORAL at 09:29

## 2019-01-29 ENCOUNTER — TELEPHONE (OUTPATIENT)
Dept: GASTROENTEROLOGY | Facility: CLINIC | Age: 59
End: 2019-01-29

## 2019-01-29 VITALS
SYSTOLIC BLOOD PRESSURE: 148 MMHG | OXYGEN SATURATION: 92 % | HEART RATE: 89 BPM | DIASTOLIC BLOOD PRESSURE: 83 MMHG | TEMPERATURE: 98.2 F | RESPIRATION RATE: 18 BRPM | HEIGHT: 64 IN | BODY MASS INDEX: 47.19 KG/M2 | WEIGHT: 276.4 LBS

## 2019-01-29 PROBLEM — D12.6 ADENOMATOUS COLON POLYP: Status: ACTIVE | Noted: 2019-01-29

## 2019-01-29 PROBLEM — K29.80 DUODENITIS: Status: ACTIVE | Noted: 2019-01-29

## 2019-01-29 LAB
ABO + RH BLD: NORMAL
ABO + RH BLD: NORMAL
ANION GAP SERPL CALCULATED.3IONS-SCNC: 13.4 MMOL/L
BH BB BLOOD EXPIRATION DATE: NORMAL
BH BB BLOOD EXPIRATION DATE: NORMAL
BH BB BLOOD TYPE BARCODE: 5100
BH BB BLOOD TYPE BARCODE: 5100
BH BB DISPENSE STATUS: NORMAL
BH BB DISPENSE STATUS: NORMAL
BH BB PRODUCT CODE: NORMAL
BH BB PRODUCT CODE: NORMAL
BH BB UNIT NUMBER: NORMAL
BH BB UNIT NUMBER: NORMAL
BUN BLD-MCNC: 17 MG/DL (ref 6–20)
BUN/CREAT SERPL: 13.5 (ref 7–25)
CALCIUM SPEC-SCNC: 8.9 MG/DL (ref 8.6–10.5)
CHLORIDE SERPL-SCNC: 105 MMOL/L (ref 98–107)
CO2 SERPL-SCNC: 22.6 MMOL/L (ref 22–29)
CREAT BLD-MCNC: 1.26 MG/DL (ref 0.57–1)
CYTO UR: NORMAL
DEPRECATED RDW RBC AUTO: 43.5 FL (ref 37–54)
ERYTHROCYTE [DISTWIDTH] IN BLOOD BY AUTOMATED COUNT: 12.5 % (ref 11.7–13)
GFR SERPL CREATININE-BSD FRML MDRD: 44 ML/MIN/1.73
GLUCOSE BLD-MCNC: 86 MG/DL (ref 65–99)
H PYLORI AG STL QL IA: NEGATIVE
HCT VFR BLD AUTO: 30.6 % (ref 35.6–45.5)
HGB BLD-MCNC: 10 G/DL (ref 11.9–15.5)
LAB AP CASE REPORT: NORMAL
LAB AP SPECIAL STAINS: NORMAL
MCH RBC QN AUTO: 31.4 PG (ref 26.9–32)
MCHC RBC AUTO-ENTMCNC: 32.7 G/DL (ref 32.4–36.3)
MCV RBC AUTO: 96.2 FL (ref 80.5–98.2)
PATH REPORT.FINAL DX SPEC: NORMAL
PATH REPORT.GROSS SPEC: NORMAL
PLATELET # BLD AUTO: 239 10*3/MM3 (ref 140–500)
PMV BLD AUTO: 8.8 FL (ref 6–12)
POTASSIUM BLD-SCNC: 4.1 MMOL/L (ref 3.5–5.2)
RBC # BLD AUTO: 3.18 10*6/MM3 (ref 3.9–5.2)
SODIUM BLD-SCNC: 141 MMOL/L (ref 136–145)
UNIT  ABO: NORMAL
UNIT  ABO: NORMAL
UNIT  RH: NORMAL
UNIT  RH: NORMAL
WBC NRBC COR # BLD: 5.48 10*3/MM3 (ref 4.5–10.7)

## 2019-01-29 PROCEDURE — 85027 COMPLETE CBC AUTOMATED: CPT | Performed by: INTERNAL MEDICINE

## 2019-01-29 PROCEDURE — 99232 SBSQ HOSP IP/OBS MODERATE 35: CPT | Performed by: INTERNAL MEDICINE

## 2019-01-29 PROCEDURE — 80048 BASIC METABOLIC PNL TOTAL CA: CPT | Performed by: HOSPITALIST

## 2019-01-29 RX ORDER — PANTOPRAZOLE SODIUM 40 MG/1
40 TABLET, DELAYED RELEASE ORAL
Qty: 60 TABLET | Refills: 0 | Status: SHIPPED | OUTPATIENT
Start: 2019-01-29 | End: 2019-03-02 | Stop reason: HOSPADM

## 2019-01-29 RX ADMIN — SODIUM CHLORIDE, PRESERVATIVE FREE 10 ML: 5 INJECTION INTRAVENOUS at 09:16

## 2019-01-29 RX ADMIN — LEVOTHYROXINE SODIUM 125 MCG: 125 TABLET ORAL at 06:32

## 2019-01-29 RX ADMIN — ISOSORBIDE DINITRATE 10 MG: 10 TABLET ORAL at 09:15

## 2019-01-29 RX ADMIN — CHOLECALCIFEROL TAB 10 MCG (400 UNIT) 400 UNITS: 10 TAB at 09:15

## 2019-01-29 RX ADMIN — METOPROLOL SUCCINATE 50 MG: 50 TABLET, FILM COATED, EXTENDED RELEASE ORAL at 09:16

## 2019-01-29 RX ADMIN — TIZANIDINE 4 MG: 4 TABLET ORAL at 09:16

## 2019-01-29 RX ADMIN — PANTOPRAZOLE SODIUM 40 MG: 40 TABLET, DELAYED RELEASE ORAL at 06:32

## 2019-01-29 RX ADMIN — DULOXETINE HYDROCHLORIDE 60 MG: 60 CAPSULE, DELAYED RELEASE ORAL at 09:16

## 2019-01-29 RX ADMIN — OXYBUTYNIN CHLORIDE 5 MG: 5 TABLET, EXTENDED RELEASE ORAL at 09:35

## 2019-01-29 RX ADMIN — HYOSCYAMINE SULFATE 125 MCG: 0.12 TABLET, ORALLY DISINTEGRATING ORAL at 11:48

## 2019-01-29 RX ADMIN — ROSUVASTATIN CALCIUM 20 MG: 20 TABLET, FILM COATED ORAL at 09:15

## 2019-01-29 RX ADMIN — HYOSCYAMINE SULFATE 125 MCG: 0.12 TABLET, ORALLY DISINTEGRATING ORAL at 02:02

## 2019-01-29 NOTE — TELEPHONE ENCOUNTER
----- Message from Magi Deleon MD sent at 1/29/2019  9:43 AM EST -----  Needs hosp f/u, 4-6 weeks, thanks, Tish or TED

## 2019-01-30 ENCOUNTER — READMISSION MANAGEMENT (OUTPATIENT)
Dept: CALL CENTER | Facility: HOSPITAL | Age: 59
End: 2019-01-30

## 2019-01-31 ENCOUNTER — READMISSION MANAGEMENT (OUTPATIENT)
Dept: CALL CENTER | Facility: HOSPITAL | Age: 59
End: 2019-01-31

## 2019-01-31 NOTE — OUTREACH NOTE
Medical Week 1 Survey      Responses   Facility patient discharged from?  [place]   Does the patient have one of the following disease processes/diagnoses(primary or secondary)?  Other   Is there a successful TCM telephone encounter documented?  No   Week 1 attempt successful?  No   Unsuccessful attempts  Attempt 1          Alfred Dsouza RN

## 2019-01-31 NOTE — OUTREACH NOTE
Prep Survey      Responses   Facility patient discharged from?  [place]   Is patient eligible?  Yes   Discharge diagnosis  Acute kidney insufficiency   Does the patient have one of the following disease processes/diagnoses(primary or secondary)?  Other   Does the patient have Home health ordered?  No   Is there a DME ordered?  No   Prep survey completed?  Yes          Elvia Odell RN

## 2019-02-01 ENCOUNTER — READMISSION MANAGEMENT (OUTPATIENT)
Dept: CALL CENTER | Facility: HOSPITAL | Age: 59
End: 2019-02-01

## 2019-02-01 NOTE — OUTREACH NOTE
Medical Week 1 Survey      Responses   Facility patient discharged from?  [place]   Does the patient have one of the following disease processes/diagnoses(primary or secondary)?  Other   Is there a successful TCM telephone encounter documented?  No   Week 1 attempt successful?  No   Unsuccessful attempts  Attempt 2          Rimma Fuller RN

## 2019-02-06 ENCOUNTER — READMISSION MANAGEMENT (OUTPATIENT)
Dept: CALL CENTER | Facility: HOSPITAL | Age: 59
End: 2019-02-06

## 2019-02-06 NOTE — OUTREACH NOTE
Medical Week 2 Survey      Responses   Facility patient discharged from?  [place]   Does the patient have one of the following disease processes/diagnoses(primary or secondary)?  Other   Week 2 attempt successful?  No   Unsuccessful attempts  Attempt 1          Alfred Dsouza RN

## 2019-02-08 ENCOUNTER — READMISSION MANAGEMENT (OUTPATIENT)
Dept: CALL CENTER | Facility: HOSPITAL | Age: 59
End: 2019-02-08

## 2019-02-08 NOTE — OUTREACH NOTE
Medical Week 2 Survey      Responses   Facility patient discharged from?  [place]   Does the patient have one of the following disease processes/diagnoses(primary or secondary)?  Other   Week 2 attempt successful?  No   Unsuccessful attempts  Attempt 2          Alfred Dsouza RN

## 2019-02-26 ENCOUNTER — OFFICE VISIT (OUTPATIENT)
Dept: GASTROENTEROLOGY | Facility: CLINIC | Age: 59
End: 2019-02-26

## 2019-02-26 VITALS
BODY MASS INDEX: 40.12 KG/M2 | HEIGHT: 64 IN | TEMPERATURE: 98.2 F | SYSTOLIC BLOOD PRESSURE: 80 MMHG | WEIGHT: 235 LBS | DIASTOLIC BLOOD PRESSURE: 40 MMHG

## 2019-02-26 DIAGNOSIS — D12.6 ADENOMATOUS POLYP OF COLON, UNSPECIFIED PART OF COLON: ICD-10-CM

## 2019-02-26 DIAGNOSIS — K29.80 DUODENITIS: ICD-10-CM

## 2019-02-26 DIAGNOSIS — R11.2 NAUSEA VOMITING AND DIARRHEA: Primary | ICD-10-CM

## 2019-02-26 DIAGNOSIS — R19.7 NAUSEA VOMITING AND DIARRHEA: Primary | ICD-10-CM

## 2019-02-26 DIAGNOSIS — K29.60 EROSIVE GASTRITIS: ICD-10-CM

## 2019-02-26 PROCEDURE — 99214 OFFICE O/P EST MOD 30 MIN: CPT | Performed by: NURSE PRACTITIONER

## 2019-02-26 RX ORDER — SUCRALFATE ORAL 1 G/10ML
1 SUSPENSION ORAL 4 TIMES DAILY
Qty: 420 ML | Refills: 3 | Status: SHIPPED | OUTPATIENT
Start: 2019-02-26 | End: 2019-03-22 | Stop reason: SDUPTHER

## 2019-02-26 RX ORDER — PROMETHAZINE HYDROCHLORIDE 25 MG/1
25 TABLET ORAL EVERY 6 HOURS PRN
Qty: 30 TABLET | Refills: 2 | Status: SHIPPED | OUTPATIENT
Start: 2019-02-26 | End: 2019-03-02 | Stop reason: HOSPADM

## 2019-02-26 NOTE — PROGRESS NOTES
Chief Complaint   Patient presents with   • Diarrhea   • Nausea       Sandra Kellogg is a  58 y.o. female here for a hospital follow up visit for N&V and diarrhea.     HPI  59 yo f presents today accompanied by her  for hospital follow up visit for N&V and diarrhea. She is a patient of Dr. Winston. She was seen by our service at [facility] on 1/24/19 for N&V and diarrhea. Stool studies were negative. CT scan abd/pelvis showed:    IMPRESSION:     1. No definite acute intra-abdominal or intrapelvic process seen.    GES was negative. She underwent EGD and Colonoscopy on 1/27/19. EGD showed erosive gastritis, reflux esophagitis and duodenitis. Path was negative. Colonoscopy showed TA colon polyp. She was started on Protonix 40 mg BID and Levsin for abd pain diarrhea. She was able to tolerate a low residue diet and her diarrhea did resolve. She continued to have some N&V and did have some phenergan.     She admits the diarrhea really started this past December. It seemed to come out of nowhere. She denied any recent new meds or antibiotics. She reports since being home from the hospital her symptoms have returned and seem worse then before. She admits nothing she eats or drinks stays down most of the time. She continues to have N&V with diarrhea, gas and bloating. Levsin helps a little but not a lot. She denies any dysphagia, abd pain, constipation, rectal bleeding or melena. She admits her appetite is decreased and her weight has dropped from 263 to 235 lbs today.       Past Medical History:   Diagnosis Date   • Depression    • Hyperlipidemia    • Hypertension    • Hypothyroidism    • Insomnia    • Metabolic syndrome    • Myocardial infarction (CMS/HCC)    • Osteoarthritis        Past Surgical History:   Procedure Laterality Date   • CARPAL TUNNEL RELEASE Right    • COLONOSCOPY N/A 1/27/2019    Procedure: COLONOSCOPY INTO CECUM AND TI WITH COLD BX POLYPECTOMIES;  Surgeon: Robert Trent MD;   Location: [facility] ENDOSCOPY;  Service: Gastroenterology   • CORONARY ANGIOPLASTY WITH STENT PLACEMENT     • ENDOSCOPY N/A 1/27/2019    Procedure: ESOPHAGOGASTRODUODENOSCOPY WITH BX;  Surgeon: Robert Trent MD;  Location: [facility] ENDOSCOPY;  Service: Gastroenterology   • JOINT REPLACEMENT      BILATERAL   • KNEE ARTHROSCOPY Right    • SPINE SURGERY      L4-L5   • TOTAL KNEE ARTHROPLASTY Left    • TUBAL ABDOMINAL LIGATION     • ULNAR NERVE DECOMPRESSION Left        Scheduled Meds:    Continuous Infusions:  No current facility-administered medications for this visit.     PRN Meds:.    No Known Allergies    Social History     Socioeconomic History   • Marital status:      Spouse name: Not on file   • Number of children: 3   • Years of education: college   • Highest education level: Not on file   Social Needs   • Financial resource strain: Not on file   • Food insecurity - worry: Not on file   • Food insecurity - inability: Not on file   • Transportation needs - medical: Not on file   • Transportation needs - non-medical: Not on file   Occupational History   • Occupation: disables   Tobacco Use   • Smoking status: Former Smoker     Packs/day: 1.50     Years: 32.00     Pack years: 48.00     Types: Cigarettes     Start date: 1976     Last attempt to quit: 1/1/2009     Years since quitting: 10.1   • Smokeless tobacco: Never Used   Substance and Sexual Activity   • Alcohol use: No   • Drug use: No   • Sexual activity: No     Partners: Male   Other Topics Concern   • Not on file   Social History Narrative    Lives at home with ex-       Family History   Problem Relation Age of Onset   • Hypertension Mother    • Diabetes Mother    • Lung cancer Father    • No Known Problems Sister    • No Known Problems Brother    • Breast cancer Paternal Aunt    • Esophageal cancer Paternal Aunt        Review of Systems   Constitutional: Positive for appetite change, fatigue and unexpected weight change. Negative  for chills, diaphoresis and fever.   HENT: Negative for nosebleeds, postnasal drip, sore throat, trouble swallowing and voice change.    Respiratory: Negative for cough, choking, chest tightness, shortness of breath and wheezing.    Cardiovascular: Negative for chest pain, palpitations and leg swelling.   Gastrointestinal: Positive for abdominal distention, diarrhea, nausea and vomiting. Negative for abdominal pain, anal bleeding, blood in stool, constipation and rectal pain.   Endocrine: Negative for polydipsia, polyphagia and polyuria.   Musculoskeletal: Negative for gait problem.   Skin: Negative for rash and wound.   Allergic/Immunologic: Negative for food allergies.   Neurological: Negative for dizziness, speech difficulty and light-headedness.   Psychiatric/Behavioral: Negative for confusion, self-injury, sleep disturbance and suicidal ideas.       Vitals:    02/26/19 1305   BP: (!) 80/40   Temp: 98.2 °F (36.8 °C)       Physical Exam   Constitutional: She is oriented to person, place, and time. She appears well-developed and well-nourished. She does not appear ill. No distress.   HENT:   Head: Normocephalic.   Eyes: Pupils are equal, round, and reactive to light.   Cardiovascular: Normal rate, regular rhythm and normal heart sounds.   Pulmonary/Chest: Effort normal and breath sounds normal.   Abdominal: Soft. Bowel sounds are normal. She exhibits no distension and no mass. There is no hepatosplenomegaly. There is no tenderness. There is no rebound and no guarding. No hernia.   Musculoskeletal: Normal range of motion.   Neurological: She is alert and oriented to person, place, and time.   Skin: Skin is warm and dry.   Psychiatric: She has a normal mood and affect. Her speech is normal and behavior is normal. Judgment normal.       No images are attached to the encounter.    Assessment & Plan     1. Nausea vomiting and diarrhea  - CBC & Differential  - Comprehensive Metabolic Panel  - Amylase  - Lipase  - NM  Hepatobiliary Without CCK; Future    2. Erosive gastritis  - NM Hepatobiliary Without CCK; Future    3. Duodenitis  - NM Hepatobiliary Without CCK; Future    4. Adenomatous polyp of colon, unspecified part of colon      I reviewed all the hospital records with her today. She is still having severe issues with N&V and the diarrhea has returned. GES was normal. EGD did show erosive gastritis, duodenitis and reflux esophagitis. I do not think the Protonix is working well enough. Will give her samples of Dexilant 60 mg daily in am and will add some Carafate. Will check labs today. I worry she is getting dehydrated. Recommend lots of fluids. Will also have her take some Levsin and phenergan PRN. Continue a bland diet. Will also check HIDA scan. Stool studies in the hospital were negative. Call office Friday with update. If symptoms worsen recommend patient go to [facility] ER for immediate evaluation. Follow up with Dr. Winston in 2-3 months. Call office with any issues.

## 2019-02-27 ENCOUNTER — TELEPHONE (OUTPATIENT)
Dept: GASTROENTEROLOGY | Facility: CLINIC | Age: 59
End: 2019-02-27

## 2019-02-27 ENCOUNTER — HOSPITAL ENCOUNTER (INPATIENT)
Facility: HOSPITAL | Age: 59
LOS: 3 days | Discharge: HOME | End: 2019-03-02
Attending: EMERGENCY MEDICINE | Admitting: HOSPITALIST

## 2019-02-27 ENCOUNTER — APPOINTMENT (OUTPATIENT)
Dept: CT IMAGING | Facility: HOSPITAL | Age: 59
End: 2019-02-27

## 2019-02-27 ENCOUNTER — APPOINTMENT (OUTPATIENT)
Dept: ULTRASOUND IMAGING | Facility: HOSPITAL | Age: 59
End: 2019-02-27

## 2019-02-27 ENCOUNTER — APPOINTMENT (OUTPATIENT)
Dept: GENERAL RADIOLOGY | Facility: HOSPITAL | Age: 59
End: 2019-02-27

## 2019-02-27 DIAGNOSIS — E78.2 MIXED HYPERLIPIDEMIA: Primary | ICD-10-CM

## 2019-02-27 DIAGNOSIS — Z79.899 HIGH RISK MEDICATION USE: ICD-10-CM

## 2019-02-27 DIAGNOSIS — N28.9 ACUTE RENAL INSUFFICIENCY: Primary | ICD-10-CM

## 2019-02-27 LAB
ALBUMIN SERPL-MCNC: 4.2 G/DL (ref 3.5–5.2)
ALBUMIN SERPL-MCNC: 4.3 G/DL (ref 3.5–5.2)
ALBUMIN/GLOB SERPL: 1.2 G/DL
ALBUMIN/GLOB SERPL: 1.3 G/DL
ALP SERPL-CCNC: 70 U/L (ref 39–117)
ALP SERPL-CCNC: 79 U/L (ref 39–117)
ALT SERPL W P-5'-P-CCNC: 16 U/L (ref 1–33)
ALT SERPL-CCNC: 16 U/L (ref 1–33)
AMYLASE SERPL-CCNC: 64 U/L (ref 28–100)
ANION GAP SERPL CALCULATED.3IONS-SCNC: 19.5 MMOL/L
AST SERPL-CCNC: 9 U/L (ref 1–32)
AST SERPL-CCNC: 9 U/L (ref 1–32)
BASOPHILS # BLD AUTO: 0.03 10*3/MM3 (ref 0–0.2)
BASOPHILS # BLD AUTO: 0.03 10*3/MM3 (ref 0–0.2)
BASOPHILS NFR BLD AUTO: 0.2 % (ref 0–1.5)
BASOPHILS NFR BLD AUTO: 0.3 % (ref 0–1.5)
BILIRUB SERPL-MCNC: 0.3 MG/DL (ref 0.1–1.2)
BILIRUB SERPL-MCNC: 0.3 MG/DL (ref 0.1–1.2)
BUN BLD-MCNC: 88 MG/DL (ref 6–20)
BUN SERPL-MCNC: 88 MG/DL (ref 6–20)
BUN/CREAT SERPL: 22.5 (ref 7–25)
BUN/CREAT SERPL: 22.9 (ref 7–25)
CALCIUM SERPL-MCNC: 10.2 MG/DL (ref 8.6–10.5)
CALCIUM SPEC-SCNC: 9.9 MG/DL (ref 8.6–10.5)
CHLORIDE SERPL-SCNC: 94 MMOL/L (ref 98–107)
CHLORIDE SERPL-SCNC: 98 MMOL/L (ref 98–107)
CO2 SERPL-SCNC: 14.5 MMOL/L (ref 22–29)
CO2 SERPL-SCNC: 17.9 MMOL/L (ref 22–29)
CREAT BLD-MCNC: 3.85 MG/DL (ref 0.57–1)
CREAT SERPL-MCNC: 3.91 MG/DL (ref 0.57–1)
DEPRECATED RDW RBC AUTO: 43.6 FL (ref 37–54)
EOSINOPHIL # BLD AUTO: 0.07 10*3/MM3 (ref 0–0.4)
EOSINOPHIL # BLD AUTO: 0.1 10*3/MM3 (ref 0–0.4)
EOSINOPHIL NFR BLD AUTO: 0.7 % (ref 0.3–6.2)
EOSINOPHIL NFR BLD AUTO: 0.7 % (ref 0.3–6.2)
ERYTHROCYTE [DISTWIDTH] IN BLOOD BY AUTOMATED COUNT: 12.4 % (ref 12.3–15.4)
ERYTHROCYTE [DISTWIDTH] IN BLOOD BY AUTOMATED COUNT: 13.2 % (ref 12.3–15.4)
GFR SERPL CREATININE-BSD FRML MDRD: 12 ML/MIN/1.73
GFR SERPL CREATININE-BSD FRML MDRD: ABNORMAL ML/MIN/1.73
GLOBULIN SER CALC-MCNC: 3.3 GM/DL
GLOBULIN UR ELPH-MCNC: 3.5 GM/DL
GLUCOSE BLD-MCNC: 115 MG/DL (ref 65–99)
GLUCOSE SERPL-MCNC: 104 MG/DL (ref 65–99)
HCT VFR BLD AUTO: 40 % (ref 34–46.6)
HCT VFR BLD AUTO: 41.2 % (ref 34–46.6)
HGB BLD-MCNC: 13 G/DL (ref 12–15.9)
HGB BLD-MCNC: 13.2 G/DL (ref 12–15.9)
HOLD SPECIMEN: NORMAL
HOLD SPECIMEN: NORMAL
IMM GRANULOCYTES # BLD AUTO: 0.03 10*3/MM3 (ref 0–0.05)
IMM GRANULOCYTES # BLD AUTO: 0.07 10*3/MM3 (ref 0–0.05)
IMM GRANULOCYTES NFR BLD AUTO: 0.3 % (ref 0–0.5)
IMM GRANULOCYTES NFR BLD AUTO: 0.5 % (ref 0–0.5)
LIPASE SERPL-CCNC: 30 U/L (ref 13–60)
LIPASE SERPL-CCNC: 65 U/L (ref 13–60)
LYMPHOCYTES # BLD AUTO: 0.81 10*3/MM3 (ref 0.7–3.1)
LYMPHOCYTES # BLD AUTO: 0.85 10*3/MM3 (ref 0.7–3.1)
LYMPHOCYTES NFR BLD AUTO: 6.4 % (ref 19.6–45.3)
LYMPHOCYTES NFR BLD AUTO: 8.5 % (ref 19.6–45.3)
MCH RBC QN AUTO: 31.5 PG (ref 26.6–33)
MCH RBC QN AUTO: 31.7 PG (ref 26.6–33)
MCHC RBC AUTO-ENTMCNC: 31.6 G/DL (ref 31.5–35.7)
MCHC RBC AUTO-ENTMCNC: 33 G/DL (ref 31.5–35.7)
MCV RBC AUTO: 95.9 FL (ref 79–97)
MCV RBC AUTO: 99.8 FL (ref 79–97)
MONOCYTES # BLD AUTO: 0.63 10*3/MM3 (ref 0.1–0.9)
MONOCYTES # BLD AUTO: 1.07 10*3/MM3 (ref 0.1–0.9)
MONOCYTES NFR BLD AUTO: 6.6 % (ref 5–12)
MONOCYTES NFR BLD AUTO: 8 % (ref 5–12)
NEUTROPHILS # BLD AUTO: 11.26 10*3/MM3 (ref 1.4–7)
NEUTROPHILS # BLD AUTO: 7.91 10*3/MM3 (ref 1.4–7)
NEUTROPHILS NFR BLD AUTO: 83.6 % (ref 42.7–76)
NEUTROPHILS NFR BLD AUTO: 84.2 % (ref 42.7–76)
NRBC BLD AUTO-RTO: 0 /100 WBC (ref 0–0)
NRBC BLD AUTO-RTO: 0 /100 WBC (ref 0–0)
NT-PROBNP SERPL-MCNC: 230.7 PG/ML (ref 0–900)
PLATELET # BLD AUTO: 426 10*3/MM3 (ref 140–450)
PLATELET # BLD AUTO: 435 10*3/MM3 (ref 140–450)
PMV BLD AUTO: 9 FL (ref 6–12)
POTASSIUM BLD-SCNC: 4.5 MMOL/L (ref 3.5–5.2)
POTASSIUM SERPL-SCNC: 4.9 MMOL/L (ref 3.5–5.2)
PROT SERPL-MCNC: 7.5 G/DL (ref 6–8.5)
PROT SERPL-MCNC: 7.8 G/DL (ref 6–8.5)
RBC # BLD AUTO: 4.13 10*6/MM3 (ref 3.77–5.28)
RBC # BLD AUTO: 4.17 10*6/MM3 (ref 3.77–5.28)
SODIUM BLD-SCNC: 128 MMOL/L (ref 136–145)
SODIUM SERPL-SCNC: 134 MMOL/L (ref 136–145)
TROPONIN T SERPL-MCNC: <0.01 NG/ML (ref 0–0.03)
WBC # BLD AUTO: 13.38 10*3/MM3 (ref 3.4–10.8)
WBC NRBC COR # BLD: 9.48 10*3/MM3 (ref 3.4–10.8)
WHOLE BLOOD HOLD SPECIMEN: NORMAL
WHOLE BLOOD HOLD SPECIMEN: NORMAL

## 2019-02-27 PROCEDURE — 74176 CT ABD & PELVIS W/O CONTRAST: CPT

## 2019-02-27 PROCEDURE — 80053 COMPREHEN METABOLIC PANEL: CPT | Performed by: EMERGENCY MEDICINE

## 2019-02-27 PROCEDURE — 85025 COMPLETE CBC W/AUTO DIFF WBC: CPT | Performed by: EMERGENCY MEDICINE

## 2019-02-27 PROCEDURE — 84484 ASSAY OF TROPONIN QUANT: CPT | Performed by: EMERGENCY MEDICINE

## 2019-02-27 PROCEDURE — 83880 ASSAY OF NATRIURETIC PEPTIDE: CPT | Performed by: EMERGENCY MEDICINE

## 2019-02-27 PROCEDURE — 99284 EMERGENCY DEPT VISIT MOD MDM: CPT

## 2019-02-27 PROCEDURE — 93005 ELECTROCARDIOGRAM TRACING: CPT | Performed by: EMERGENCY MEDICINE

## 2019-02-27 PROCEDURE — 25010000002 ONDANSETRON PER 1 MG: Performed by: HOSPITALIST

## 2019-02-27 PROCEDURE — 25010000002 ONDANSETRON PER 1 MG: Performed by: NURSE PRACTITIONER

## 2019-02-27 PROCEDURE — 83690 ASSAY OF LIPASE: CPT | Performed by: EMERGENCY MEDICINE

## 2019-02-27 PROCEDURE — 93010 ELECTROCARDIOGRAM REPORT: CPT | Performed by: INTERNAL MEDICINE

## 2019-02-27 PROCEDURE — 71046 X-RAY EXAM CHEST 2 VIEWS: CPT

## 2019-02-27 RX ORDER — ONDANSETRON 2 MG/ML
4 INJECTION INTRAMUSCULAR; INTRAVENOUS EVERY 6 HOURS PRN
Status: DISCONTINUED | OUTPATIENT
Start: 2019-02-27 | End: 2019-03-02 | Stop reason: HOSPADM

## 2019-02-27 RX ORDER — ONDANSETRON 2 MG/ML
4 INJECTION INTRAMUSCULAR; INTRAVENOUS ONCE
Status: COMPLETED | OUTPATIENT
Start: 2019-02-27 | End: 2019-02-27

## 2019-02-27 RX ORDER — ACETAMINOPHEN 325 MG/1
650 TABLET ORAL EVERY 6 HOURS PRN
Status: DISCONTINUED | OUTPATIENT
Start: 2019-02-27 | End: 2019-03-02 | Stop reason: HOSPADM

## 2019-02-27 RX ORDER — SODIUM CHLORIDE 9 MG/ML
75 INJECTION, SOLUTION INTRAVENOUS CONTINUOUS
Status: DISCONTINUED | OUTPATIENT
Start: 2019-02-27 | End: 2019-03-02 | Stop reason: HOSPADM

## 2019-02-27 RX ORDER — ONDANSETRON 4 MG/1
4 TABLET, ORALLY DISINTEGRATING ORAL EVERY 6 HOURS PRN
Status: DISCONTINUED | OUTPATIENT
Start: 2019-02-27 | End: 2019-03-02 | Stop reason: HOSPADM

## 2019-02-27 RX ORDER — SODIUM CHLORIDE 9 MG/ML
250 INJECTION, SOLUTION INTRAVENOUS CONTINUOUS
Status: ACTIVE | OUTPATIENT
Start: 2019-02-27 | End: 2019-02-27

## 2019-02-27 RX ORDER — SODIUM CHLORIDE 0.9 % (FLUSH) 0.9 %
10 SYRINGE (ML) INJECTION AS NEEDED
Status: DISCONTINUED | OUTPATIENT
Start: 2019-02-27 | End: 2019-03-02 | Stop reason: HOSPADM

## 2019-02-27 RX ORDER — ONDANSETRON 4 MG/1
4 TABLET, FILM COATED ORAL EVERY 6 HOURS PRN
Status: DISCONTINUED | OUTPATIENT
Start: 2019-02-27 | End: 2019-03-02 | Stop reason: HOSPADM

## 2019-02-27 RX ORDER — METOPROLOL SUCCINATE 50 MG/1
50 TABLET, EXTENDED RELEASE ORAL DAILY
Status: DISCONTINUED | OUTPATIENT
Start: 2019-02-27 | End: 2019-03-02 | Stop reason: HOSPADM

## 2019-02-27 RX ORDER — FAMOTIDINE 10 MG/ML
20 INJECTION, SOLUTION INTRAVENOUS DAILY
Status: DISCONTINUED | OUTPATIENT
Start: 2019-02-27 | End: 2019-03-02 | Stop reason: HOSPADM

## 2019-02-27 RX ORDER — SUCRALFATE ORAL 1 G/10ML
1 SUSPENSION ORAL 4 TIMES DAILY
Status: DISCONTINUED | OUTPATIENT
Start: 2019-02-27 | End: 2019-03-02 | Stop reason: HOSPADM

## 2019-02-27 RX ORDER — DULOXETIN HYDROCHLORIDE 60 MG/1
60 CAPSULE, DELAYED RELEASE ORAL DAILY
Status: DISCONTINUED | OUTPATIENT
Start: 2019-02-27 | End: 2019-03-02 | Stop reason: HOSPADM

## 2019-02-27 RX ORDER — ISOSORBIDE MONONITRATE 60 MG/1
60 TABLET, EXTENDED RELEASE ORAL DAILY
Status: DISCONTINUED | OUTPATIENT
Start: 2019-02-27 | End: 2019-03-02 | Stop reason: HOSPADM

## 2019-02-27 RX ORDER — DICYCLOMINE HYDROCHLORIDE 10 MG/1
20 CAPSULE ORAL ONCE
Status: COMPLETED | OUTPATIENT
Start: 2019-02-27 | End: 2019-02-27

## 2019-02-27 RX ORDER — DEXLANSOPRAZOLE 60 MG/1
60 CAPSULE, DELAYED RELEASE ORAL DAILY
COMMUNITY
End: 2019-03-02 | Stop reason: HOSPADM

## 2019-02-27 RX ORDER — LEVOTHYROXINE SODIUM 0.12 MG/1
125 TABLET ORAL
Status: DISCONTINUED | OUTPATIENT
Start: 2019-02-28 | End: 2019-03-02 | Stop reason: HOSPADM

## 2019-02-27 RX ORDER — ZOLPIDEM TARTRATE 5 MG/1
10 TABLET ORAL NIGHTLY PRN
Status: DISCONTINUED | OUTPATIENT
Start: 2019-02-27 | End: 2019-03-02 | Stop reason: HOSPADM

## 2019-02-27 RX ADMIN — ONDANSETRON HYDROCHLORIDE 4 MG: 2 SOLUTION INTRAMUSCULAR; INTRAVENOUS at 20:20

## 2019-02-27 RX ADMIN — SODIUM CHLORIDE 250 ML: 9 INJECTION, SOLUTION INTRAVENOUS at 16:25

## 2019-02-27 RX ADMIN — SUCRALFATE 1 G: 1 SUSPENSION ORAL at 21:54

## 2019-02-27 RX ADMIN — SODIUM CHLORIDE 125 ML/HR: 9 INJECTION, SOLUTION INTRAVENOUS at 20:20

## 2019-02-27 RX ADMIN — SODIUM CHLORIDE 1000 ML: 9 INJECTION, SOLUTION INTRAVENOUS at 13:17

## 2019-02-27 RX ADMIN — ZOLPIDEM TARTRATE 10 MG: 5 TABLET ORAL at 21:55

## 2019-02-27 RX ADMIN — ISOSORBIDE MONONITRATE 60 MG: 60 TABLET ORAL at 18:26

## 2019-02-27 RX ADMIN — DULOXETINE HYDROCHLORIDE 60 MG: 60 CAPSULE, DELAYED RELEASE ORAL at 18:27

## 2019-02-27 RX ADMIN — FAMOTIDINE 20 MG: 10 INJECTION INTRAVENOUS at 18:28

## 2019-02-27 RX ADMIN — DICYCLOMINE HYDROCHLORIDE 20 MG: 10 CAPSULE ORAL at 14:01

## 2019-02-27 RX ADMIN — ONDANSETRON HYDROCHLORIDE 4 MG: 2 SOLUTION INTRAMUSCULAR; INTRAVENOUS at 14:02

## 2019-02-27 NOTE — TELEPHONE ENCOUNTER
VM to pt with urgent request to contact office.    VM to Jd (see hipaa) with urgent request to contact office.

## 2019-02-27 NOTE — TELEPHONE ENCOUNTER
Notes recorded by Nel Ash APRN on 2/27/2019 at 8:35 AM EST  Please call patient and let her know her labs showed a very high CREAT level of 3.91!!!!! She also has elevated WBC. I think she needs to go to the ER and get IV fluids and have repeat labs drawn. I don't want her kidneys to shut down!

## 2019-02-27 NOTE — TELEPHONE ENCOUNTER
----- Message from LAVONNE Vargas sent at 2/27/2019 11:15 AM EST -----  FYI     Patient called back and I discussed with her the abnormal labs from this morning. I recommended she go immediately to [facility] ER for further evaluation. She agreed and was going to have her  drive her over.     Thanks    Nel.   ----- Message -----  From: Nel Ash APRN  Sent: 2/27/2019   8:35 AM  To: [facility] Clinical 1 Pool    Please call patient and let her know her labs showed a very high CREAT level of 3.91!!!!! She also has elevated WBC. I think she needs to go to the ER and get IV fluids and have repeat labs drawn. I don't want her kidneys to shut down!

## 2019-02-28 PROBLEM — N18.9 CKD (CHRONIC KIDNEY DISEASE): Status: ACTIVE | Noted: 2019-02-28

## 2019-02-28 LAB
ALBUMIN SERPL-MCNC: 3.5 G/DL (ref 3.5–5.2)
ANION GAP SERPL CALCULATED.3IONS-SCNC: 13.6 MMOL/L
BUN BLD-MCNC: 69 MG/DL (ref 6–20)
BUN/CREAT SERPL: 29 (ref 7–25)
CALCIUM SPEC-SCNC: 9.2 MG/DL (ref 8.6–10.5)
CHLORIDE SERPL-SCNC: 107 MMOL/L (ref 98–107)
CO2 SERPL-SCNC: 16.4 MMOL/L (ref 22–29)
CREAT BLD-MCNC: 2.38 MG/DL (ref 0.57–1)
GFR SERPL CREATININE-BSD FRML MDRD: 21 ML/MIN/1.73
GLUCOSE BLD-MCNC: 84 MG/DL (ref 65–99)
MAGNESIUM SERPL-MCNC: 2.7 MG/DL (ref 1.6–2.6)
OSMOLALITY SERPL: 311 MOSM/KG (ref 275–300)
PHOSPHATE SERPL-MCNC: 5 MG/DL (ref 2.5–4.5)
POTASSIUM BLD-SCNC: 4.9 MMOL/L (ref 3.5–5.2)
SODIUM BLD-SCNC: 137 MMOL/L (ref 136–145)
TSH SERPL DL<=0.05 MIU/L-ACNC: 0.39 MIU/ML (ref 0.27–4.2)

## 2019-02-28 PROCEDURE — 80069 RENAL FUNCTION PANEL: CPT | Performed by: HOSPITALIST

## 2019-02-28 PROCEDURE — 83930 ASSAY OF BLOOD OSMOLALITY: CPT | Performed by: HOSPITALIST

## 2019-02-28 PROCEDURE — 84443 ASSAY THYROID STIM HORMONE: CPT | Performed by: HOSPITALIST

## 2019-02-28 PROCEDURE — 83735 ASSAY OF MAGNESIUM: CPT | Performed by: HOSPITALIST

## 2019-02-28 RX ORDER — TIZANIDINE 4 MG/1
2 TABLET ORAL EVERY 8 HOURS PRN
Status: DISCONTINUED | OUTPATIENT
Start: 2019-02-28 | End: 2019-03-02 | Stop reason: HOSPADM

## 2019-02-28 RX ADMIN — SUCRALFATE 1 G: 1 SUSPENSION ORAL at 12:03

## 2019-02-28 RX ADMIN — FAMOTIDINE 20 MG: 10 INJECTION INTRAVENOUS at 09:18

## 2019-02-28 RX ADMIN — ISOSORBIDE MONONITRATE 60 MG: 60 TABLET ORAL at 08:55

## 2019-02-28 RX ADMIN — SODIUM CHLORIDE 125 ML/HR: 9 INJECTION, SOLUTION INTRAVENOUS at 21:11

## 2019-02-28 RX ADMIN — SODIUM CHLORIDE 125 ML/HR: 9 INJECTION, SOLUTION INTRAVENOUS at 13:08

## 2019-02-28 RX ADMIN — SODIUM CHLORIDE 125 ML/HR: 9 INJECTION, SOLUTION INTRAVENOUS at 04:50

## 2019-02-28 RX ADMIN — ZOLPIDEM TARTRATE 10 MG: 5 TABLET ORAL at 22:20

## 2019-02-28 RX ADMIN — DULOXETINE HYDROCHLORIDE 60 MG: 60 CAPSULE, DELAYED RELEASE ORAL at 08:55

## 2019-02-28 RX ADMIN — TIZANIDINE 2 MG: 4 TABLET ORAL at 22:20

## 2019-02-28 RX ADMIN — SUCRALFATE 1 G: 1 SUSPENSION ORAL at 17:00

## 2019-02-28 RX ADMIN — SUCRALFATE 1 G: 1 SUSPENSION ORAL at 21:13

## 2019-02-28 RX ADMIN — LEVOTHYROXINE SODIUM 125 MCG: 125 TABLET ORAL at 06:43

## 2019-03-01 PROBLEM — E87.5 HYPERKALEMIA: Status: ACTIVE | Noted: 2019-03-01

## 2019-03-01 LAB
ALBUMIN SERPL-MCNC: 3.6 G/DL (ref 3.5–5.2)
ANION GAP SERPL CALCULATED.3IONS-SCNC: 6.4 MMOL/L
BUN BLD-MCNC: 40 MG/DL (ref 6–20)
BUN/CREAT SERPL: 22.9 (ref 7–25)
CALCIUM SPEC-SCNC: 9.3 MG/DL (ref 8.6–10.5)
CHLORIDE SERPL-SCNC: 114 MMOL/L (ref 98–107)
CO2 SERPL-SCNC: 18.6 MMOL/L (ref 22–29)
CREAT BLD-MCNC: 1.75 MG/DL (ref 0.57–1)
GFR SERPL CREATININE-BSD FRML MDRD: 30 ML/MIN/1.73
GLUCOSE BLD-MCNC: 84 MG/DL (ref 65–99)
MAGNESIUM SERPL-MCNC: 2.3 MG/DL (ref 1.6–2.6)
PHOSPHATE SERPL-MCNC: 3.2 MG/DL (ref 2.5–4.5)
POTASSIUM BLD-SCNC: 5.3 MMOL/L (ref 3.5–5.2)
SODIUM BLD-SCNC: 139 MMOL/L (ref 136–145)

## 2019-03-01 PROCEDURE — 83735 ASSAY OF MAGNESIUM: CPT | Performed by: HOSPITALIST

## 2019-03-01 PROCEDURE — 80069 RENAL FUNCTION PANEL: CPT | Performed by: HOSPITALIST

## 2019-03-01 RX ORDER — SODIUM POLYSTYRENE SULFONATE 15 G/60ML
15 SUSPENSION ORAL; RECTAL ONCE
Status: COMPLETED | OUTPATIENT
Start: 2019-03-01 | End: 2019-03-01

## 2019-03-01 RX ADMIN — SODIUM CHLORIDE 75 ML/HR: 9 INJECTION, SOLUTION INTRAVENOUS at 12:11

## 2019-03-01 RX ADMIN — ZOLPIDEM TARTRATE 10 MG: 5 TABLET ORAL at 22:34

## 2019-03-01 RX ADMIN — SODIUM POLYSTYRENE SULFONATE 15 G: 15 SUSPENSION ORAL; RECTAL at 12:06

## 2019-03-01 RX ADMIN — SUCRALFATE 1 G: 1 SUSPENSION ORAL at 18:34

## 2019-03-01 RX ADMIN — ISOSORBIDE MONONITRATE 60 MG: 60 TABLET ORAL at 08:36

## 2019-03-01 RX ADMIN — SODIUM CHLORIDE 125 ML/HR: 9 INJECTION, SOLUTION INTRAVENOUS at 04:53

## 2019-03-01 RX ADMIN — DULOXETINE HYDROCHLORIDE 60 MG: 60 CAPSULE, DELAYED RELEASE ORAL at 08:36

## 2019-03-01 RX ADMIN — METOPROLOL SUCCINATE 50 MG: 50 TABLET, FILM COATED, EXTENDED RELEASE ORAL at 08:36

## 2019-03-01 RX ADMIN — TIZANIDINE 2 MG: 4 TABLET ORAL at 22:33

## 2019-03-01 RX ADMIN — LEVOTHYROXINE SODIUM 125 MCG: 125 TABLET ORAL at 06:30

## 2019-03-01 RX ADMIN — FAMOTIDINE 20 MG: 10 INJECTION INTRAVENOUS at 08:41

## 2019-03-01 RX ADMIN — SUCRALFATE 1 G: 1 SUSPENSION ORAL at 08:36

## 2019-03-01 NOTE — PROGRESS NOTES
"    DAILY PROGRESS NOTE  [facility]    Patient Identification:  Name: Sandra Kellogg  Age: 58 y.o.  Sex: female  :  1960  MRN: [id number]         Primary Care Physician: Jh Muniz MD    Subjective:  Interval History: No new issues.  No emesis or diarrhea.  Patient request GI consult even though no active GI symptoms and was just seen by GI the day of admission.  Feels ready to advance diet.  Good urine output.  No shortness of breath or chest pain at rest    Objective: No family at bedside.  Sitting up in the chair and clinically looks better    Scheduled Meds:    DULoxetine 60 mg Oral Daily   famotidine 20 mg Intravenous Daily   isosorbide mononitrate 60 mg Oral Daily   levothyroxine 125 mcg Oral Q AM   metoprolol succinate XL 50 mg Oral Daily   sodium polystyrene 15 g Oral Once   sucralfate 1 g Oral 4x Daily     Continuous Infusions:    sodium chloride 75 mL/hr Last Rate: 125 mL/hr (19 0453)       Vital signs in last 24 hours:  Temp:  [97 °F (36.1 °C)-98.5 °F (36.9 °C)] 97.5 °F (36.4 °C)  Heart Rate:  [79-94] 90  Resp:  [18] 18  BP: (100-130)/(65-87) 125/87    Intake/Output:    Intake/Output Summary (Last 24 hours) at 3/1/2019 1132  Last data filed at 3/1/2019 0900  Gross per 24 hour   Intake 3634.42 ml   Output 3150 ml   Net 484.42 ml       Exam:  /87 (BP Location: Left arm, Patient Position: Lying)   Pulse 90   Temp 97.5 °F (36.4 °C) (Oral)   Resp 18   Ht 162.6 cm (64\")   Wt 109 kg (240 lb)   LMP  (LMP Unknown)   SpO2 95%   BMI 41.20 kg/m²     General Appearance:    Alert, cooperative, not sickly, AAOx3                         Throat:   Lips, tongue, gums normal; oral mucosa pink and moist                           Neck:   No JVD                         Lungs:    Clear to auscultation bilaterally, respirations unlabored                          Heart:    Regular rate and rhythm, S1 and S2 normal                 Abdomen:     Soft, non-tender, bowel sounds active    "              Extremities:   No cyanosis or edema       Neurologic:    Cranial nerves II through XII are intact patient moving all extremities without focal deficit                         Data Review:  Labs in chart were reviewed.    Assessment:  Active Hospital Problems    Diagnosis Date Noted   • **Acute renal insufficiency [N28.9] 02/27/2019   • Hyperkalemia [E87.5] 03/01/2019   • CKD (chronic kidney disease) [N18.9] 02/28/2019   • Duodenitis [K29.80] 01/29/2019   • Chronic pain [G89.29] 01/24/2019   • CAD (coronary artery disease) [I25.10] 01/24/2019   • Dehydration [E86.0] 01/24/2019   • Polypharmacy [Z79.899] 01/24/2019   • Acquired hypothyroidism [E03.9] 07/21/2017   • Essential hypertension [I10] 07/20/2017   • Hyperlipidemia [E78.5] 07/20/2017      Resolved Hospital Problems   No resolved problems to display.       Plan:  ARF/CKD2-3               -ARF resolving with IV fluids as is acid base balance also improving.  Patient needs more volume and will continue IV fluids though decrease rate              -CT did not demonstrate any renal obstruction              -Continue to avoid nephrotoxic medications as multiple ones have been held    Hyperkalemia -treat with Kayexalate x1 but given improving renal function and continued IV fluid anticipate this to improve and will repeat in a.m.  With advancement of diet will ensure that she will be placed on a low potassium diet     Duodenitis/enteritis -followed by GI in outpatient setting.  Unique how there has been no further issues with emesis or diarrhea since admission.    DC stool studies - outpatient GI    Mild hyponatremia resolved     Hypothyroidism with therapeutic TSH     Past history of coronary artery disease with negative troponin -hold further workup and suggest outpatient cardiology follow-up     SCDs for DVT prophylaxis       Dispo - home tomorrow - CCP for DC needs     Miguelito Enciso MD  3/1/2019  11:32 AM

## 2019-03-01 NOTE — PLAN OF CARE
Problem: Patient Care Overview  Goal: Plan of Care Review  Outcome: Ongoing (interventions implemented as appropriate)   03/01/19 5949   Coping/Psychosocial   Plan of Care Reviewed With patient   Plan of Care Review   Progress improving   OTHER   Outcome Summary tolerated pudding and ice cream. no stool; still need speciman collected. rested quietly after HS meds given. up with assist of 1. bed alarm in use for safety. IVF's continue.      Goal: Individualization and Mutuality  Outcome: Ongoing (interventions implemented as appropriate)    Goal: Discharge Needs Assessment  Outcome: Ongoing (interventions implemented as appropriate)    Goal: Interprofessional Rounds/Family Conf  Outcome: Ongoing (interventions implemented as appropriate)      Problem: Fall Risk (Adult)  Goal: Absence of Fall  Outcome: Ongoing (interventions implemented as appropriate)      Problem: Renal Failure/Kidney Injury, Acute (Adult)  Goal: Signs and Symptoms of Listed Potential Problems Will be Absent, Minimized or Managed (Renal Failure/Kidney Injury, Acute)  Outcome: Ongoing (interventions implemented as appropriate)

## 2019-03-01 NOTE — PLAN OF CARE
Problem: Patient Care Overview  Goal: Plan of Care Review  Outcome: Ongoing (interventions implemented as appropriate)   03/01/19 5270   Coping/Psychosocial   Plan of Care Reviewed With patient   Plan of Care Review   Progress improving   OTHER   Outcome Summary pt mireille full liq and adv to low fat renal diet. no c/o n/v and no BM after kayexelate given for K 5.3. IVFs infusing. up ad myrtle and gait steady. walks with cane if needed for longer distance. walks in [place] with family.     Goal: Individualization and Mutuality  Outcome: Ongoing (interventions implemented as appropriate)    Goal: Discharge Needs Assessment  Outcome: Ongoing (interventions implemented as appropriate)    Goal: Interprofessional Rounds/Family Conf  Outcome: Ongoing (interventions implemented as appropriate)      Problem: Fall Risk (Adult)  Goal: Absence of Fall  Outcome: Ongoing (interventions implemented as appropriate)      Problem: Renal Failure/Kidney Injury, Acute (Adult)  Goal: Signs and Symptoms of Listed Potential Problems Will be Absent, Minimized or Managed (Renal Failure/Kidney Injury, Acute)  Outcome: Ongoing (interventions implemented as appropriate)

## 2019-03-01 NOTE — PROGRESS NOTES
Discharge Planning Assessment  [facility]     Patient Name: Sandra Kellogg  MRN: [id number]  Today's Date: 3/1/2019    Admit Date: 2/27/2019    Discharge Needs Assessment    No documentation.       Discharge Plan     Row Name 03/01/19 1118       Plan    Plan  Home w/o needs    Plan Comments  I spoke with pt and advised of anticipated discharge today, pt confirmed she plans home w/o needs.  I ask if I could make her a hospital follow up appointment with her PCP, she said she already has one with Dr Muniz next week (March 7 @ 10:45AM).      Yajaira Nichols, RN

## 2019-03-02 VITALS
WEIGHT: 240 LBS | BODY MASS INDEX: 40.97 KG/M2 | DIASTOLIC BLOOD PRESSURE: 90 MMHG | HEART RATE: 75 BPM | OXYGEN SATURATION: 90 % | SYSTOLIC BLOOD PRESSURE: 146 MMHG | HEIGHT: 64 IN | RESPIRATION RATE: 18 BRPM | TEMPERATURE: 98.9 F

## 2019-03-02 PROBLEM — E87.5 HYPERKALEMIA: Status: RESOLVED | Noted: 2019-03-01 | Resolved: 2019-03-02

## 2019-03-02 PROBLEM — E86.0 DEHYDRATION: Status: RESOLVED | Noted: 2019-01-24 | Resolved: 2019-03-02

## 2019-03-02 LAB
ANION GAP SERPL CALCULATED.3IONS-SCNC: 10.5 MMOL/L
BUN BLD-MCNC: 36 MG/DL (ref 6–20)
BUN/CREAT SERPL: 23.2 (ref 7–25)
CALCIUM SPEC-SCNC: 9 MG/DL (ref 8.6–10.5)
CHLORIDE SERPL-SCNC: 112 MMOL/L (ref 98–107)
CO2 SERPL-SCNC: 20.5 MMOL/L (ref 22–29)
CREAT BLD-MCNC: 1.55 MG/DL (ref 0.57–1)
GFR SERPL CREATININE-BSD FRML MDRD: 34 ML/MIN/1.73
GLUCOSE BLD-MCNC: 95 MG/DL (ref 65–99)
POTASSIUM BLD-SCNC: 4.6 MMOL/L (ref 3.5–5.2)
SODIUM BLD-SCNC: 143 MMOL/L (ref 136–145)

## 2019-03-02 PROCEDURE — 80048 BASIC METABOLIC PNL TOTAL CA: CPT | Performed by: HOSPITALIST

## 2019-03-02 RX ORDER — FAMOTIDINE 20 MG/1
20 TABLET, FILM COATED ORAL 2 TIMES DAILY
Qty: 60 TABLET | Refills: 0 | Status: SHIPPED | OUTPATIENT
Start: 2019-03-02 | End: 2019-03-20 | Stop reason: SDUPTHER

## 2019-03-02 RX ADMIN — SUCRALFATE 1 G: 1 SUSPENSION ORAL at 08:54

## 2019-03-02 RX ADMIN — SUCRALFATE 1 G: 1 SUSPENSION ORAL at 12:19

## 2019-03-02 RX ADMIN — DULOXETINE HYDROCHLORIDE 60 MG: 60 CAPSULE, DELAYED RELEASE ORAL at 08:54

## 2019-03-02 RX ADMIN — METOPROLOL SUCCINATE 50 MG: 50 TABLET, FILM COATED, EXTENDED RELEASE ORAL at 08:54

## 2019-03-02 RX ADMIN — SODIUM CHLORIDE 75 ML/HR: 9 INJECTION, SOLUTION INTRAVENOUS at 01:47

## 2019-03-02 RX ADMIN — FAMOTIDINE 20 MG: 10 INJECTION INTRAVENOUS at 08:54

## 2019-03-02 RX ADMIN — LEVOTHYROXINE SODIUM 125 MCG: 125 TABLET ORAL at 05:47

## 2019-03-02 RX ADMIN — ISOSORBIDE MONONITRATE 60 MG: 60 TABLET ORAL at 08:54

## 2019-03-02 NOTE — DISCHARGE SUMMARY
[facility]    Date of Discharge:  3/2/2019    PCP: Jh Muniz MD    Discharge Diagnosis:   Active Hospital Problems    Diagnosis Date Noted   • **Acute renal insufficiency [N28.9] 02/27/2019   • CKD (chronic kidney disease) [N18.9] 02/28/2019   • Duodenitis [K29.80] 01/29/2019   • Chronic pain [G89.29] 01/24/2019   • CAD (coronary artery disease) [I25.10] 01/24/2019   • Polypharmacy [Z79.899] 01/24/2019   • Acquired hypothyroidism [E03.9] 07/21/2017   • Essential hypertension [I10] 07/20/2017   • Hyperlipidemia [E78.5] 07/20/2017      Resolved Hospital Problems    Diagnosis Date Noted Date Resolved   • Hyperkalemia [E87.5] 03/01/2019 03/02/2019   • Dehydration [E86.0] 01/24/2019 03/02/2019     Procedures Performed       Consults     Date and Time Order Name Status Description    2/27/2019 1336 [facility] (on-call MD unless specified)      1/24/2019 0203 Inpatient Gastroenterology Consult Completed     1/23/2019 2301 [facility] (on-call MD unless specified) Completed         Hospital Course  Please see history and physical for details. Patient is a 58 y.o. female limited by myself she was sent to the hospital due to abnormal renal function noted in her GI office.  Patient had been seeing them previously with concerns for duodenitis/enteritis.  Interesting enough patient reports nausea vomiting and diarrhea prior to admission.  We have had none of those issues on this admission and she has been here for the last 3-4 days.  CT of abdomen pelvis is not impressive and GI was not consulted on this admission as she can follow-up with them in an outpatient setting.  Her acute renal failure has resolved with removal of multiple insulting medications most notably hydrochlorothiazide.  Her hyponatremia also resolved.  She is now back to a level of 1.55 with a BUN of 36 potassium 4.6 and a bicarb that is improved to 20.5.  I feel she likely has issues of CKD stage II-3.  I did  not consult nephrology given the fact that she responded to medical management.  CT the abdomen pelvis did not demonstrate any renal obstruction.  She did develop hyperkalemia during the admission but it resolved with Kayexalate and IV fluids.  She has a therapeutic TSH given her past history of hypothyroidism.  She has a past history of coronary artery disease with negative troponins and I will defer to PCP to organize any additional outpatient cardiology follow-up if not already scheduled.  Vital signs are stable as well as afebrile.  Patient is more than medically stable to be discharged home with further outpatient management.  Patient states andria Iqbal has an appointment scheduled with her PCP and I would advise repeat of the BMP at that juncture.        Condition on Discharge: Improved.     Temp:  [97.8 °F (36.6 °C)-98.9 °F (37.2 °C)] 98.9 °F (37.2 °C)  Heart Rate:  [74-88] 75  Resp:  [18] 18  BP: (105-146)/(63-90) 146/90  Body mass index is 41.2 kg/m².    Physical Exam   Constitutional: She is oriented to person, place, and time. She appears well-developed and well-nourished.   HENT:   Head: Normocephalic.   Neck: No JVD present.   Cardiovascular: Normal rate and regular rhythm.   Pulmonary/Chest: Effort normal and breath sounds normal. No respiratory distress.   Abdominal: Soft. Bowel sounds are normal. She exhibits no distension. There is no tenderness. There is no guarding.   Musculoskeletal: She exhibits no edema.   Neurological: She is alert and oriented to person, place, and time. No cranial nerve deficit.   Psychiatric:   Seems anxious/uneasy at times        Discharge Medications      New Medications      Instructions Start Date   famotidine 20 MG tablet  Commonly known as:  PEPCID   20 mg, Oral, 2 Times Daily         Continue These Medications      Instructions Start Date   aspirin 81 MG tablet   81 mg, Oral, Daily      DULoxetine 60 MG capsule  Commonly known as:  CYMBALTA   60 mg, Oral, Daily       hyoscyamine 0.125 MG SL tablet  Commonly known as:  LEVSIN   125 mcg, Oral, Every 6 Hours PRN      isosorbide mononitrate 60 MG 24 hr tablet  Commonly known as:  IMDUR   60 mg, Oral, Daily      levothyroxine 125 MCG tablet  Commonly known as:  SYNTHROID, LEVOTHROID   125 mcg, Oral, Daily      metoprolol succinate XL 50 MG 24 hr tablet  Commonly known as:  TOPROL-XL   TAKE 1 TABLET DAILY      OMEGA-3 FISH OIL PO   1 tablet, Oral, Daily      rosuvastatin 20 MG tablet  Commonly known as:  CRESTOR   TAKE 1 TABLET DAILY      sucralfate 1 GM/10ML suspension  Commonly known as:  CARAFATE   1 g, Oral, 4 Times Daily      tiZANidine 4 MG tablet  Commonly known as:  ZANAFLEX   4 mg, Oral, 3 Times Daily      Vitamin D3 5000 units chewable tablet   1 tablet, Oral, Daily      zolpidem 10 MG tablet  Commonly known as:  AMBIEN   10 mg, Oral, Daily         Stop These Medications    dexlansoprazole 60 MG capsule  Commonly known as:  DEXILANT     oxybutynin XL 5 MG 24 hr tablet  Commonly known as:  DITROPAN-XL     oxyCODONE-acetaminophen  MG per tablet  Commonly known as:  PERCOCET     pantoprazole 40 MG EC tablet  Commonly known as:  PROTONIX     promethazine 25 MG tablet  Commonly known as:  PHENERGAN             Follow-up Information     Jh Muniz MD .    Specialty:  Family Medicine  Contact information:  [facility] [place] [address] [phone]                 Test Results Pending at Discharge     Miguelito Enciso MD  03/02/19  12:35 PM    Discharge time spent greater than 30 minutes.

## 2019-03-02 NOTE — PLAN OF CARE
Problem: Patient Care Overview  Goal: Plan of Care Review  Outcome: Outcome(s) achieved Date Met: 03/02/19 03/02/19 4731   Coping/Psychosocial   Plan of Care Reviewed With patient;spouse   OTHER   Outcome Summary VSS, no c/o pain, no difficulty voiding, reports small BM this am, right lower lobe with rhonchi other lobes clear, no difficulty breathing, d/c home     Goal: Individualization and Mutuality  Outcome: Outcome(s) achieved Date Met: 03/02/19    Goal: Discharge Needs Assessment  Outcome: Outcome(s) achieved Date Met: 03/02/19    Goal: Interprofessional Rounds/Family Conf  Outcome: Outcome(s) achieved Date Met: 03/02/19      Problem: Fall Risk (Adult)  Goal: Absence of Fall  Outcome: Outcome(s) achieved Date Met: 03/02/19      Problem: Renal Failure/Kidney Injury, Acute (Adult)  Goal: Signs and Symptoms of Listed Potential Problems Will be Absent, Minimized or Managed (Renal Failure/Kidney Injury, Acute)  Outcome: Outcome(s) achieved Date Met: 03/02/19

## 2019-03-02 NOTE — PLAN OF CARE
Problem: Patient Care Overview  Goal: Plan of Care Review  Outcome: Ongoing (interventions implemented as appropriate)   03/02/19 0432   Coping/Psychosocial   Plan of Care Reviewed With patient   Plan of Care Review   Progress improving   OTHER   Outcome Summary VSS. No complaints of N/V. Kayexelate given yesterday due to elevated K+, no BM's reported throughout the night. Up ad myrtle to void. IVF continued.      Goal: Individualization and Mutuality  Outcome: Ongoing (interventions implemented as appropriate)    Goal: Discharge Needs Assessment  Outcome: Ongoing (interventions implemented as appropriate)    Goal: Interprofessional Rounds/Family Conf  Outcome: Ongoing (interventions implemented as appropriate)      Problem: Fall Risk (Adult)  Goal: Absence of Fall  Outcome: Ongoing (interventions implemented as appropriate)      Problem: Renal Failure/Kidney Injury, Acute (Adult)  Goal: Signs and Symptoms of Listed Potential Problems Will be Absent, Minimized or Managed (Renal Failure/Kidney Injury, Acute)  Outcome: Ongoing (interventions implemented as appropriate)

## 2019-03-03 ENCOUNTER — READMISSION MANAGEMENT (OUTPATIENT)
Dept: CALL CENTER | Facility: HOSPITAL | Age: 59
End: 2019-03-03

## 2019-03-04 NOTE — PAYOR COMM NOTE
"Sandra Kellogg (58 y.o. Female)     ATTN: NURSE REVIEW    REF#[id number]  PLEASE CALL BACK TO ROME [phone] OR [phone]  THANKS!  ROME      Date of Birth Social Security Number Address Home Phone MRN    1960  [address]  [facility] [id number] [phone] [id number]    [place] Marital Status          Unknown        Admission Date Admission Type Admitting Provider Attending Provider Department, Room/Bed    19 Emergency Miguelito Enciso MD  [facility] [address], P697/1    Discharge Date Discharge Disposition Discharge Destination        3/2/2019 Home or Self Care              Attending Provider:  (none)   Allergies:  No Known Allergies    Isolation:  None   Infection:  None   Code Status:  Prior    Ht:  162.6 cm (64\")   Wt:  109 kg (240 lb)    Admission Cmt:  None   Principal Problem:  Acute renal insufficiency [N28.9]                 Active Insurance as of 2019     Primary Coverage     Payor Plan Insurance Group Employer/Plan Group    [employer] P[employer] [id number]     Payor Plan Address Payor Plan Phone Number Payor Plan Fax Number Effective Dates    [address] [phone]  2018 - None Entered    [place] [address]       Subscriber Name Subscriber Birth Date Member ID       SANDRA KELLOGG 1960 [id number]                 Emergency Contacts      (Rel.) Home Phone Work Phone Mobile Phone    Jd Kellogg (Significant Other) [phone] -- --                 Physician Progress Notes (last 72 hours) (Notes from 3/1/2019  8:52 AM through 3/4/2019  8:52 AM)      Miguelito Enciso MD at 3/1/2019 11:02 AM              DAILY PROGRESS NOTE  [facility]    Patient Identification:  Name: Sandra Kellogg  Age: 58 y.o.  Sex: female  :  1960  MRN: [id number]         Primary Care Physician: Jh Muniz MD    Subjective:  Interval History: No new issues.  No emesis or diarrhea. " " Patient request GI consult even though no active GI symptoms and was just seen by GI the day of admission.  Feels ready to advance diet.  Good urine output.  No shortness of breath or chest pain at rest    Objective: No family at bedside.  Sitting up in the chair and clinically looks better    Scheduled Meds:    DULoxetine 60 mg Oral Daily   famotidine 20 mg Intravenous Daily   isosorbide mononitrate 60 mg Oral Daily   levothyroxine 125 mcg Oral Q AM   metoprolol succinate XL 50 mg Oral Daily   sodium polystyrene 15 g Oral Once   sucralfate 1 g Oral 4x Daily     Continuous Infusions:    sodium chloride 75 mL/hr Last Rate: 125 mL/hr (03/01/19 3703)       Vital signs in last 24 hours:  Temp:  [97 °F (36.1 °C)-98.5 °F (36.9 °C)] 97.5 °F (36.4 °C)  Heart Rate:  [79-94] 90  Resp:  [18] 18  BP: (100-130)/(65-87) 125/87    Intake/Output:    Intake/Output Summary (Last 24 hours) at 3/1/2019 1132  Last data filed at 3/1/2019 0900  Gross per 24 hour   Intake 3634.42 ml   Output 3150 ml   Net 484.42 ml       Exam:  /87 (BP Location: Left arm, Patient Position: Lying)   Pulse 90   Temp 97.5 °F (36.4 °C) (Oral)   Resp 18   Ht 162.6 cm (64\")   Wt 109 kg (240 lb)   LMP  (LMP Unknown)   SpO2 95%   BMI 41.20 kg/m²      General Appearance:    Alert, cooperative, not sickly, AAOx3                         Throat:   Lips, tongue, gums normal; oral mucosa pink and moist                           Neck:   No JVD                         Lungs:    Clear to auscultation bilaterally, respirations unlabored                          Heart:    Regular rate and rhythm, S1 and S2 normal                 Abdomen:     Soft, non-tender, bowel sounds active                 Extremities:   No cyanosis or edema       Neurologic:    Cranial nerves II through XII are intact patient moving all extremities without focal deficit                         Data Review:  Labs in chart were reviewed.    Assessment:  Active Hospital Problems    " Diagnosis Date Noted   • **Acute renal insufficiency [N28.9] 02/27/2019   • Hyperkalemia [E87.5] 03/01/2019   • CKD (chronic kidney disease) [N18.9] 02/28/2019   • Duodenitis [K29.80] 01/29/2019   • Chronic pain [G89.29] 01/24/2019   • CAD (coronary artery disease) [I25.10] 01/24/2019   • Dehydration [E86.0] 01/24/2019   • Polypharmacy [Z79.899] 01/24/2019   • Acquired hypothyroidism [E03.9] 07/21/2017   • Essential hypertension [I10] 07/20/2017   • Hyperlipidemia [E78.5] 07/20/2017      Resolved Hospital Problems   No resolved problems to display.       Plan:  ARF/CKD2-3               -ARF resolving with IV fluids as is acid base balance also improving.  Patient needs more volume and will continue IV fluids though decrease rate              -CT did not demonstrate any renal obstruction              -Continue to avoid nephrotoxic medications as multiple ones have been held    Hyperkalemia -treat with Kayexalate x1 but given improving renal function and continued IV fluid anticipate this to improve and will repeat in a.m.  With advancement of diet will ensure that she will be placed on a low potassium diet     Duodenitis/enteritis -followed by GI in outpatient setting.  Unique how there has been no further issues with emesis or diarrhea since admission.     DC stool studies - outpatient GI    Mild hyponatremia resolved     Hypothyroidism with therapeutic TSH     Past history of coronary artery disease with negative troponin -hold further workup and suggest outpatient cardiology follow-up     SCDs for DVT prophylaxis       Dispo - home tomorrow - CCP for DC needs     Miguelito Enciso MD  3/1/2019  11:32 AM      Electronically signed by Miguelito Enciso MD at 3/1/2019 11:35 AM       Consult Notes (last 72 hours) (Notes from 3/1/2019  8:52 AM through 3/4/2019  8:52 AM)     No notes of this type exist for this encounter.           Discharge Summary      Miguelito Enciso MD at 3/2/2019 12:35 PM                               [facility]    Date of Discharge:  3/2/2019    PCP: Jh Muniz MD    Discharge Diagnosis:   Active Hospital Problems    Diagnosis Date Noted   • **Acute renal insufficiency [N28.9] 02/27/2019   • CKD (chronic kidney disease) [N18.9] 02/28/2019   • Duodenitis [K29.80] 01/29/2019   • Chronic pain [G89.29] 01/24/2019   • CAD (coronary artery disease) [I25.10] 01/24/2019   • Polypharmacy [Z79.899] 01/24/2019   • Acquired hypothyroidism [E03.9] 07/21/2017   • Essential hypertension [I10] 07/20/2017   • Hyperlipidemia [E78.5] 07/20/2017      Resolved Hospital Problems    Diagnosis Date Noted Date Resolved   • Hyperkalemia [E87.5] 03/01/2019 03/02/2019   • Dehydration [E86.0] 01/24/2019 03/02/2019     Procedures Performed       Consults     Date and Time Order Name Status Description    2/27/2019 1336 [facility] (on-call MD unless specified)      1/24/2019 0203 Inpatient Gastroenterology Consult Completed     1/23/2019 2301 [facility] (on-call MD unless specified) Completed         Hospital Course  Please see history and physical for details. Patient is a 58 y.o. female limited by myself she was sent to the hospital due to abnormal renal function noted in her GI office.  Patient had been seeing them previously with concerns for duodenitis/enteritis.  Interesting enough patient reports nausea vomiting and diarrhea prior to admission.  We have had none of those issues on this admission and she has been here for the last 3-4 days.  CT of abdomen pelvis is not impressive and GI was not consulted on this admission as she can follow-up with them in an outpatient setting.  Her acute renal failure has resolved with removal of multiple insulting medications most notably hydrochlorothiazide.  Her hyponatremia also resolved.  She is now back to a level of 1.55 with a BUN of 36 potassium 4.6 and a bicarb that is improved to 20.5.  I feel she likely has issues of CKD stage II-3.  I did not  consult nephrology given the fact that she responded to medical management.  CT the abdomen pelvis did not demonstrate any renal obstruction.  She did develop hyperkalemia during the admission but it resolved with Kayexalate and IV fluids.  She has a therapeutic TSH given her past history of hypothyroidism.  She has a past history of coronary artery disease with negative troponins and I will defer to PCP to organize any additional outpatient cardiology follow-up if not already scheduled.  Vital signs are stable as well as afebrile.  Patient is more than medically stable to be discharged home with further outpatient management.  Patient states andria Iqbal has an appointment scheduled with her PCP and I would advise repeat of the BMP at that juncture.        Condition on Discharge: Improved.     Temp:  [97.8 °F (36.6 °C)-98.9 °F (37.2 °C)] 98.9 °F (37.2 °C)  Heart Rate:  [74-88] 75  Resp:  [18] 18  BP: (105-146)/(63-90) 146/90  Body mass index is 41.2 kg/m².    Physical Exam   Constitutional: She is oriented to person, place, and time. She appears well-developed and well-nourished.   HENT:   Head: Normocephalic.   Neck: No JVD present.   Cardiovascular: Normal rate and regular rhythm.   Pulmonary/Chest: Effort normal and breath sounds normal. No respiratory distress.   Abdominal: Soft. Bowel sounds are normal. She exhibits no distension. There is no tenderness. There is no guarding.   Musculoskeletal: She exhibits no edema.   Neurological: She is alert and oriented to person, place, and time. No cranial nerve deficit.   Psychiatric:   Seems anxious/uneasy at times        Discharge Medications      New Medications      Instructions Start Date   famotidine 20 MG tablet  Commonly known as:  PEPCID   20 mg, Oral, 2 Times Daily         Continue These Medications      Instructions Start Date   aspirin 81 MG tablet   81 mg, Oral, Daily      DULoxetine 60 MG capsule  Commonly known as:  CYMBALTA   60 mg, Oral, Daily       hyoscyamine 0.125 MG SL tablet  Commonly known as:  LEVSIN   125 mcg, Oral, Every 6 Hours PRN      isosorbide mononitrate 60 MG 24 hr tablet  Commonly known as:  IMDUR   60 mg, Oral, Daily      levothyroxine 125 MCG tablet  Commonly known as:  SYNTHROID, LEVOTHROID   125 mcg, Oral, Daily      metoprolol succinate XL 50 MG 24 hr tablet  Commonly known as:  TOPROL-XL   TAKE 1 TABLET DAILY      OMEGA-3 FISH OIL PO   1 tablet, Oral, Daily      rosuvastatin 20 MG tablet  Commonly known as:  CRESTOR   TAKE 1 TABLET DAILY      sucralfate 1 GM/10ML suspension  Commonly known as:  CARAFATE   1 g, Oral, 4 Times Daily      tiZANidine 4 MG tablet  Commonly known as:  ZANAFLEX   4 mg, Oral, 3 Times Daily      Vitamin D3 5000 units chewable tablet   1 tablet, Oral, Daily      zolpidem 10 MG tablet  Commonly known as:  AMBIEN   10 mg, Oral, Daily         Stop These Medications    dexlansoprazole 60 MG capsule  Commonly known as:  DEXILANT     oxybutynin XL 5 MG 24 hr tablet  Commonly known as:  DITROPAN-XL     oxyCODONE-acetaminophen  MG per tablet  Commonly known as:  PERCOCET     pantoprazole 40 MG EC tablet  Commonly known as:  PROTONIX     promethazine 25 MG tablet  Commonly known as:  PHENERGAN             Follow-up Information     Jh Muniz MD .    Specialty:  Family Medicine  Contact information:  [address] [place] [address] [phone]                 Test Results Pending at Discharge     Miguelito Enciso MD  03/02/19  12:35 PM    Discharge time spent greater than 30 minutes.    Electronically signed by Miguelito Enciso MD at 3/2/2019 12:40 PM

## 2019-03-04 NOTE — OUTREACH NOTE
Prep Survey      Responses   Facility patient discharged from?  [place]   Is patient eligible?  Yes   Discharge diagnosis  Acute renal failure, hyponatremia,    Does the patient have one of the following disease processes/diagnoses(primary or secondary)?  Other   Does the patient have Home health ordered?  No   Is there a DME ordered?  No   Prep survey completed?  Yes          Sandra Jaime RN

## 2019-03-06 ENCOUNTER — READMISSION MANAGEMENT (OUTPATIENT)
Dept: CALL CENTER | Facility: HOSPITAL | Age: 59
End: 2019-03-06

## 2019-03-06 LAB
ALBUMIN SERPL-MCNC: 4 G/DL (ref 3.5–5.2)
ALBUMIN/GLOB SERPL: 1.5 G/DL
ALP SERPL-CCNC: 58 U/L (ref 39–117)
ALT SERPL-CCNC: 18 U/L (ref 1–33)
AST SERPL-CCNC: 10 U/L (ref 1–32)
BILIRUB SERPL-MCNC: 0.4 MG/DL (ref 0.1–1.2)
BUN SERPL-MCNC: 22 MG/DL (ref 6–20)
BUN/CREAT SERPL: 18 (ref 7–25)
CALCIUM SERPL-MCNC: 9.6 MG/DL (ref 8.6–10.5)
CHLORIDE SERPL-SCNC: 107 MMOL/L (ref 98–107)
CHOLEST SERPL-MCNC: 209 MG/DL (ref 0–200)
CO2 SERPL-SCNC: 22.7 MMOL/L (ref 22–29)
CREAT SERPL-MCNC: 1.22 MG/DL (ref 0.57–1)
GLOBULIN SER CALC-MCNC: 2.7 GM/DL
GLUCOSE SERPL-MCNC: 110 MG/DL (ref 65–99)
HDLC SERPL-MCNC: 50 MG/DL (ref 40–60)
LDLC SERPL CALC-MCNC: 132 MG/DL (ref 0–100)
LDLC/HDLC SERPL: 2.64 {RATIO}
POTASSIUM SERPL-SCNC: 4.1 MMOL/L (ref 3.5–5.2)
PROT SERPL-MCNC: 6.7 G/DL (ref 6–8.5)
SODIUM SERPL-SCNC: 143 MMOL/L (ref 136–145)
TRIGL SERPL-MCNC: 136 MG/DL (ref 0–150)
VLDLC SERPL CALC-MCNC: 27.2 MG/DL (ref 5–40)

## 2019-03-06 NOTE — OUTREACH NOTE
Medical Week 1 Survey      Responses   Facility patient discharged from?  [place]   Does the patient have one of the following disease processes/diagnoses(primary or secondary)?  Other   Is there a successful TCM telephone encounter documented?  No   Week 1 attempt successful?  No   Unsuccessful attempts  Attempt 1          Jennifer Ortiz RN

## 2019-03-07 ENCOUNTER — OFFICE VISIT (OUTPATIENT)
Dept: FAMILY MEDICINE CLINIC | Facility: CLINIC | Age: 59
End: 2019-03-07

## 2019-03-07 ENCOUNTER — TELEPHONE (OUTPATIENT)
Dept: GASTROENTEROLOGY | Facility: CLINIC | Age: 59
End: 2019-03-07

## 2019-03-07 VITALS
DIASTOLIC BLOOD PRESSURE: 84 MMHG | SYSTOLIC BLOOD PRESSURE: 138 MMHG | BODY MASS INDEX: 40.46 KG/M2 | HEART RATE: 71 BPM | RESPIRATION RATE: 16 BRPM | WEIGHT: 237 LBS | OXYGEN SATURATION: 98 % | HEIGHT: 64 IN

## 2019-03-07 DIAGNOSIS — N28.9 RENAL INSUFFICIENCY: ICD-10-CM

## 2019-03-07 DIAGNOSIS — I10 ESSENTIAL HYPERTENSION: Primary | ICD-10-CM

## 2019-03-07 PROCEDURE — 99214 OFFICE O/P EST MOD 30 MIN: CPT | Performed by: FAMILY MEDICINE

## 2019-03-07 RX ORDER — AMLODIPINE BESYLATE 10 MG/1
10 TABLET ORAL DAILY
COMMUNITY
End: 2019-05-03 | Stop reason: SDUPTHER

## 2019-03-07 NOTE — TELEPHONE ENCOUNTER
----- Message from James Webber sent at 3/7/2019 12:33 PM EST -----  Regarding: records   Contact: [phone]  Cindy calling from [employer] asking if we could fax over last office note       Fax#[phone]

## 2019-03-07 NOTE — PROGRESS NOTES
Subjective   Sandra Kellogg is a 58 y.o. female.     History of Present Illness   Sandra is here for follow up after hospitalization for dehydration.  She has had some follow up lab work done and is here today to discuss.  She was on amlodipine, lisinopril and HCTZ at her January 23,2019 admission. She did not realize that she was supposed to stop these at d/c so she has been taking amlodipine and lisinopril.She was admitted again 2/27/19, she was not noted to be on these meds., but she was. She is now off HCTZ but is still taking lisinopril.  She had an acute kidney injury. She was not given a f/u with renal.  B/P is well controled today.  The following portions of the patient's history were reviewed and updated as appropriate: allergies, current medications, past family history, past medical history, past social history, past surgical history and problem list.    Review of Systems   Constitutional: Positive for appetite change.   Musculoskeletal: Positive for arthralgias and back pain.   Neurological: Positive for weakness.   Psychiatric/Behavioral: Positive for sleep disturbance and stress. The patient is nervous/anxious.    All other systems reviewed and are negative.      Objective   Physical Exam   Constitutional: She is oriented to person, place, and time. She appears well-developed and well-nourished.   Ambulating with a cane   HENT:   Head: Normocephalic and atraumatic.   Eyes: EOM are normal. Pupils are equal, round, and reactive to light.   Neck: Normal range of motion.   Cardiovascular: Normal rate, regular rhythm and normal heart sounds.   No murmur heard.  Pulmonary/Chest: Effort normal and breath sounds normal.   Neurological: She is alert and oriented to person, place, and time.   Nursing note and vitals reviewed.        Assessment/Plan   Diagnoses and all orders for this visit:    Essential hypertension  B/p is well controlled today but I am concerned about lisinopril. I have referred her to nephrology  for help with this.   -     Ambulatory Referral to Nephrology    Renal insufficiency  -     Ambulatory Referral to Nephrology      Current outpatient and discharge medications have been reconciled for the patient.  Reviewed by: Jh Muniz MD

## 2019-03-07 NOTE — PATIENT INSTRUCTIONS
STOP lisinopril. Continue amlodipine and metoprolol for blood pressure .  I have referred you to nephrology for evaluation and treatment.

## 2019-03-10 ENCOUNTER — READMISSION MANAGEMENT (OUTPATIENT)
Dept: CALL CENTER | Facility: HOSPITAL | Age: 59
End: 2019-03-10

## 2019-03-10 NOTE — OUTREACH NOTE
Medical Week 1 Survey      Responses   Facility patient discharged from?  [place]   Does the patient have one of the following disease processes/diagnoses(primary or secondary)?  Other   Is there a successful TCM telephone encounter documented?  No   Week 1 attempt successful?  No   Unsuccessful attempts  Attempt 2          Darby Agudelo RN

## 2019-03-12 ENCOUNTER — READMISSION MANAGEMENT (OUTPATIENT)
Dept: CALL CENTER | Facility: HOSPITAL | Age: 59
End: 2019-03-12

## 2019-03-12 NOTE — OUTREACH NOTE
Medical Week 2 Survey      Responses   Facility patient discharged from?  [place]   Does the patient have one of the following disease processes/diagnoses(primary or secondary)?  Other   Week 2 attempt successful?  No   Unsuccessful attempts  Attempt 1          Hollie Juarez RN

## 2019-03-15 ENCOUNTER — READMISSION MANAGEMENT (OUTPATIENT)
Dept: CALL CENTER | Facility: HOSPITAL | Age: 59
End: 2019-03-15

## 2019-03-15 NOTE — OUTREACH NOTE
Medical Week 2 Survey      Responses   Facility patient discharged from?  [place]   Does the patient have one of the following disease processes/diagnoses(primary or secondary)?  Other   Week 2 attempt successful?  No   Unsuccessful attempts  Attempt 2          Jennifer Ford RN

## 2019-03-20 ENCOUNTER — HOSPITAL ENCOUNTER (OUTPATIENT)
Dept: NUCLEAR MEDICINE | Facility: HOSPITAL | Age: 59
Discharge: HOME | End: 2019-03-20

## 2019-03-20 ENCOUNTER — TELEPHONE (OUTPATIENT)
Dept: FAMILY MEDICINE CLINIC | Facility: CLINIC | Age: 59
End: 2019-03-20

## 2019-03-20 DIAGNOSIS — R11.2 NAUSEA VOMITING AND DIARRHEA: ICD-10-CM

## 2019-03-20 DIAGNOSIS — K29.60 EROSIVE GASTRITIS: ICD-10-CM

## 2019-03-20 DIAGNOSIS — E78.2 MIXED HYPERLIPIDEMIA: ICD-10-CM

## 2019-03-20 DIAGNOSIS — K29.80 DUODENITIS: ICD-10-CM

## 2019-03-20 DIAGNOSIS — R19.7 NAUSEA VOMITING AND DIARRHEA: ICD-10-CM

## 2019-03-20 PROCEDURE — 0 TECHNETIUM TC 99M MEBROFENIN KIT: Performed by: NURSE PRACTITIONER

## 2019-03-20 PROCEDURE — A9537 TC99M MEBROFENIN: HCPCS | Performed by: NURSE PRACTITIONER

## 2019-03-20 PROCEDURE — 78226 HEPATOBILIARY SYSTEM IMAGING: CPT

## 2019-03-20 RX ORDER — ROSUVASTATIN CALCIUM 20 MG/1
20 TABLET, COATED ORAL DAILY
Qty: 90 TABLET | Refills: 1 | Status: SHIPPED | OUTPATIENT
Start: 2019-03-20 | End: 2019-08-19 | Stop reason: SDUPTHER

## 2019-03-20 RX ORDER — FAMOTIDINE 20 MG/1
20 TABLET, FILM COATED ORAL 2 TIMES DAILY
Qty: 60 TABLET | Refills: 0 | Status: SHIPPED | OUTPATIENT
Start: 2019-03-20 | End: 2019-04-04 | Stop reason: SDUPTHER

## 2019-03-20 RX ORDER — ISOSORBIDE MONONITRATE 60 MG/1
60 TABLET, EXTENDED RELEASE ORAL DAILY
Qty: 90 TABLET | Refills: 3 | Status: SHIPPED | OUTPATIENT
Start: 2019-03-20 | End: 2019-04-02 | Stop reason: SDUPTHER

## 2019-03-20 RX ORDER — KIT FOR THE PREPARATION OF TECHNETIUM TC 99M MEBROFENIN 45 MG/10ML
1 INJECTION, POWDER, LYOPHILIZED, FOR SOLUTION INTRAVENOUS
Status: COMPLETED | OUTPATIENT
Start: 2019-03-20 | End: 2019-03-20

## 2019-03-20 RX ADMIN — MEBROFENIN 1 DOSE: 45 INJECTION, POWDER, LYOPHILIZED, FOR SOLUTION INTRAVENOUS at 10:06

## 2019-03-20 NOTE — TELEPHONE ENCOUNTER
Regarding: Non-Urgent Medical Question  Contact: [phone]  ----- Message from [employer] sent at 3/20/2019  3:40 PM EDT -----    I need refills on my Isosorb mono and my famotidine. Please send to [facility] on [address]. Thank you.

## 2019-03-22 ENCOUNTER — TELEPHONE (OUTPATIENT)
Dept: GASTROENTEROLOGY | Facility: CLINIC | Age: 59
End: 2019-03-22

## 2019-03-22 RX ORDER — SUCRALFATE ORAL 1 G/10ML
1 SUSPENSION ORAL 4 TIMES DAILY
Qty: 3600 ML | Refills: 0 | Status: SHIPPED | OUTPATIENT
Start: 2019-03-22 | End: 2019-06-21 | Stop reason: SDUPTHER

## 2019-03-22 NOTE — TELEPHONE ENCOUNTER
Sandra Kellogg To  [facility] Clinical Pool Sent  3/21/2019  6:43 PM   ----- Message from [employer], Generic sent at 3/21/2019  6:43 PM EDT -----     Hi, my insurance will only refill a 90 supply of my carafate. I was wondering if you can send a refill for 90 day supply to [facility] on [address]. Thank you.     It seems to be helping. I've had no issues since I've been out of the hospital.       Bethany completed for Sucralfate 1gm/10 ml suspension - take 10 ml by mouth 4x/day, #3600 ml, R0.      Update to AVELINA Ash.

## 2019-03-22 NOTE — TELEPHONE ENCOUNTER
----- Message from Vivien Hamlin sent at 3/22/2019 11:11 AM EDT -----  Regarding: CARAFATE SCRIPT 90 DAY SUPPLY  Contact: [phone]  PT INS REQUIRES THIS MED TO BE FILLED AS A 90 DAY SCRIPT. [facility] ON [address].

## 2019-03-25 ENCOUNTER — OFFICE VISIT (OUTPATIENT)
Dept: FAMILY MEDICINE CLINIC | Facility: CLINIC | Age: 59
End: 2019-03-25

## 2019-03-25 VITALS
HEIGHT: 64 IN | OXYGEN SATURATION: 97 % | SYSTOLIC BLOOD PRESSURE: 138 MMHG | BODY MASS INDEX: 41.48 KG/M2 | HEART RATE: 71 BPM | WEIGHT: 243 LBS | DIASTOLIC BLOOD PRESSURE: 90 MMHG

## 2019-03-25 DIAGNOSIS — N30.01 ACUTE CYSTITIS WITH HEMATURIA: Primary | ICD-10-CM

## 2019-03-25 DIAGNOSIS — R31.9 HEMATURIA, UNSPECIFIED TYPE: ICD-10-CM

## 2019-03-25 LAB
BILIRUB BLD-MCNC: NEGATIVE MG/DL
CLARITY, POC: ABNORMAL
COLOR UR: ABNORMAL
GLUCOSE UR STRIP-MCNC: NEGATIVE MG/DL
KETONES UR QL: NEGATIVE
LEUKOCYTE EST, POC: ABNORMAL
NITRITE UR-MCNC: NEGATIVE MG/ML
PH UR: 6 [PH] (ref 5–8)
PROT UR STRIP-MCNC: ABNORMAL MG/DL
RBC # UR STRIP: ABNORMAL /UL
SP GR UR: 1 (ref 1–1.03)
UROBILINOGEN UR QL: NORMAL

## 2019-03-25 PROCEDURE — 99213 OFFICE O/P EST LOW 20 MIN: CPT | Performed by: NURSE PRACTITIONER

## 2019-03-25 PROCEDURE — 81002 URINALYSIS NONAUTO W/O SCOPE: CPT | Performed by: NURSE PRACTITIONER

## 2019-03-25 RX ORDER — FLUCONAZOLE 200 MG/1
200 TABLET ORAL DAILY
Qty: 1 TABLET | Refills: 0 | Status: SHIPPED | OUTPATIENT
Start: 2019-03-25 | End: 2019-07-17

## 2019-03-25 RX ORDER — SULFAMETHOXAZOLE AND TRIMETHOPRIM 800; 160 MG/1; MG/1
1 TABLET ORAL 2 TIMES DAILY
Qty: 20 TABLET | Refills: 0 | Status: SHIPPED | OUTPATIENT
Start: 2019-03-25 | End: 2019-07-17

## 2019-03-25 NOTE — PROGRESS NOTES
Subjective  Sandra has had lower abdominal pressure/pain and urinary frequency that has been present for the last few weeks.   Sandra Kellogg is a 58 y.o. female.     History of Present Illness     The following portions of the patient's history were reviewed and updated as appropriate: allergies, current medications, past family history, past medical history, past social history, past surgical history and problem list.    Review of Systems   Gastrointestinal: Positive for abdominal pain.   Genitourinary: Positive for urinary incontinence and frequency.       Objective   Physical Exam   Constitutional: She is oriented to person, place, and time. She appears well-developed and well-nourished.   HENT:   Head: Normocephalic and atraumatic.   Mouth/Throat: Oropharynx is clear and moist.   Eyes: Conjunctivae and EOM are normal. Pupils are equal, round, and reactive to light.   Neck: Neck supple.   Cardiovascular: Normal rate and regular rhythm.   Pulmonary/Chest: Effort normal and breath sounds normal. No respiratory distress.   Musculoskeletal: Normal range of motion.   Lymphadenopathy:     She has no cervical adenopathy.   Neurological: She is alert and oriented to person, place, and time.   Skin: Skin is warm and dry.   Nursing note and vitals reviewed.        Assessment/Plan   Sandra was seen today for urinary tract infection.    Diagnoses and all orders for this visit:    Acute cystitis with hematuria    Hematuria, unspecified type  -     POCT urinalysis dipstick, manual    Other orders  -     sulfamethoxazole-trimethoprim (BACTRIM DS) 800-160 MG per tablet; Take 1 tablet by mouth 2 (Two) Times a Day.  -     fluconazole (DIFLUCAN) 200 MG tablet; Take 1 tablet by mouth Daily.

## 2019-03-25 NOTE — PATIENT INSTRUCTIONS
Urinary Tract Infection, Adult  A urinary tract infection (UTI) is an infection of any part of the urinary tract. The urinary tract includes the:  · Kidneys.  · Ureters.  · Bladder.  · Urethra.    These organs make, store, and get rid of pee (urine) in the body.  Follow these instructions at home:  · Take over-the-counter and prescription medicines only as told by your doctor.  · If you were prescribed an antibiotic medicine, take it as told by your doctor. Do not stop taking the antibiotic even if you start to feel better.  · Avoid the following drinks:  ? Alcohol.  ? Caffeine.  ? Tea.  ? Carbonated drinks.  · Drink enough fluid to keep your pee clear or pale yellow.  · Keep all follow-up visits as told by your doctor. This is important.  · Make sure to:  ? Empty your bladder often and completely. Do not to hold pee for long periods of time.  ? Empty your bladder before and after sex.  ? Wipe from front to back after a bowel movement if you are female. Use each tissue one time when you wipe.  Contact a doctor if:  · You have back pain.  · You have a fever.  · You feel sick to your stomach (nauseous).  · You throw up (vomit).  · Your symptoms do not get better after 3 days.  · Your symptoms go away and then come back.  Get help right away if:  · You have very bad back pain.  · You have very bad lower belly (abdominal) pain.  · You are throwing up and cannot keep down any medicines or water.  This information is not intended to replace advice given to you by your health care provider. Make sure you discuss any questions you have with your health care provider.  Document Released: 06/05/2009 Document Revised: 06/12/2018 Document Reviewed: 11/07/2016  [employer] Interactive Patient Education © 2019 [employer]

## 2019-03-27 ENCOUNTER — TELEPHONE (OUTPATIENT)
Dept: GASTROENTEROLOGY | Facility: CLINIC | Age: 59
End: 2019-03-27

## 2019-03-27 NOTE — TELEPHONE ENCOUNTER
----- Message from LAVONNE Vargas sent at 3/21/2019  8:31 AM EDT -----  Impression    Negative.    Please call patient and let her know the HIDA scan was normal. Thanks.

## 2019-03-28 LAB
BACTERIA UR CULT: ABNORMAL
BACTERIA UR CULT: ABNORMAL
OTHER ANTIBIOTIC SUSC ISLT: ABNORMAL

## 2019-04-02 RX ORDER — ISOSORBIDE MONONITRATE 60 MG/1
60 TABLET, EXTENDED RELEASE ORAL DAILY
Qty: 90 TABLET | Refills: 3 | Status: SHIPPED | OUTPATIENT
Start: 2019-04-02 | End: 2019-04-03 | Stop reason: SDUPTHER

## 2019-04-03 RX ORDER — ISOSORBIDE MONONITRATE 60 MG/1
60 TABLET, EXTENDED RELEASE ORAL DAILY
Qty: 90 TABLET | Refills: 3 | Status: SHIPPED | OUTPATIENT
Start: 2019-04-03 | End: 2020-02-11 | Stop reason: SDUPTHER

## 2019-04-04 ENCOUNTER — TELEPHONE (OUTPATIENT)
Dept: GASTROENTEROLOGY | Facility: CLINIC | Age: 59
End: 2019-04-04

## 2019-04-04 RX ORDER — FAMOTIDINE 20 MG/1
20 TABLET, FILM COATED ORAL 2 TIMES DAILY
Qty: 180 TABLET | Refills: 1 | Status: SHIPPED | OUTPATIENT
Start: 2019-04-04 | End: 2019-08-09 | Stop reason: SDUPTHER

## 2019-04-04 NOTE — TELEPHONE ENCOUNTER
[facility] pharmacy faxed a refill request for hyoscyamine 0.125mg take one tab po every 6 hrs prn  #360.  Message sent to Nel JEAN for approval.  Pt has appt with Dr Winston on 05/14/2019.

## 2019-04-22 RX ORDER — PROMETHAZINE HYDROCHLORIDE 25 MG/1
25 TABLET ORAL EVERY 6 HOURS PRN
Qty: 30 TABLET | Refills: 2 | Status: SHIPPED | OUTPATIENT
Start: 2019-04-22 | End: 2019-07-24 | Stop reason: SDUPTHER

## 2019-05-03 RX ORDER — AMLODIPINE BESYLATE 10 MG/1
10 TABLET ORAL DAILY
Qty: 90 TABLET | Refills: 1 | Status: SHIPPED | OUTPATIENT
Start: 2019-05-03 | End: 2019-10-26 | Stop reason: SDUPTHER

## 2019-05-09 RX ORDER — DULOXETIN HYDROCHLORIDE 60 MG/1
CAPSULE, DELAYED RELEASE ORAL
Qty: 90 CAPSULE | Refills: 0 | Status: SHIPPED | OUTPATIENT
Start: 2019-05-09 | End: 2019-08-01 | Stop reason: SDUPTHER

## 2019-06-21 RX ORDER — SUCRALFATE 1 G/10ML
SUSPENSION ORAL
Qty: 3600 ML | Refills: 0 | Status: SHIPPED | OUTPATIENT
Start: 2019-06-21 | End: 2019-08-28 | Stop reason: ALTCHOICE

## 2019-07-14 DIAGNOSIS — N39.41 URGE INCONTINENCE OF URINE: ICD-10-CM

## 2019-07-15 RX ORDER — LEVOTHYROXINE SODIUM 0.12 MG/1
TABLET ORAL
Qty: 90 TABLET | Refills: 1 | Status: SHIPPED | OUTPATIENT
Start: 2019-07-15 | End: 2019-11-20 | Stop reason: SDUPTHER

## 2019-07-15 RX ORDER — OXYBUTYNIN CHLORIDE 5 MG/1
TABLET, EXTENDED RELEASE ORAL
Qty: 90 TABLET | Refills: 1 | Status: SHIPPED | OUTPATIENT
Start: 2019-07-15 | End: 2020-01-08

## 2019-07-17 ENCOUNTER — OFFICE VISIT (OUTPATIENT)
Dept: FAMILY MEDICINE CLINIC | Facility: CLINIC | Age: 59
End: 2019-07-17

## 2019-07-17 VITALS
WEIGHT: 248 LBS | SYSTOLIC BLOOD PRESSURE: 180 MMHG | BODY MASS INDEX: 42.34 KG/M2 | RESPIRATION RATE: 16 BRPM | HEIGHT: 64 IN | DIASTOLIC BLOOD PRESSURE: 94 MMHG | HEART RATE: 57 BPM | OXYGEN SATURATION: 98 %

## 2019-07-17 DIAGNOSIS — Z00.00 ROUTINE GENERAL MEDICAL EXAMINATION AT A HEALTH CARE FACILITY: Primary | ICD-10-CM

## 2019-07-17 DIAGNOSIS — I10 ESSENTIAL HYPERTENSION: ICD-10-CM

## 2019-07-17 DIAGNOSIS — E78.2 MIXED HYPERLIPIDEMIA: ICD-10-CM

## 2019-07-17 DIAGNOSIS — N18.9 CHRONIC KIDNEY DISEASE, UNSPECIFIED CKD STAGE: ICD-10-CM

## 2019-07-17 DIAGNOSIS — R91.8 ABNORMAL CT SCAN OF LUNG: ICD-10-CM

## 2019-07-17 DIAGNOSIS — Z12.39 SCREENING FOR MALIGNANT NEOPLASM OF BREAST: ICD-10-CM

## 2019-07-17 DIAGNOSIS — E03.9 ACQUIRED HYPOTHYROIDISM: ICD-10-CM

## 2019-07-17 PROCEDURE — 99396 PREV VISIT EST AGE 40-64: CPT | Performed by: FAMILY MEDICINE

## 2019-07-17 PROCEDURE — 99214 OFFICE O/P EST MOD 30 MIN: CPT | Performed by: FAMILY MEDICINE

## 2019-07-17 RX ORDER — OXYCODONE AND ACETAMINOPHEN 10; 325 MG/1; MG/1
TABLET ORAL
COMMUNITY
Start: 2019-07-09 | End: 2020-10-09 | Stop reason: SDUPTHER

## 2019-07-17 RX ORDER — AMLODIPINE BESYLATE 10 MG/1
20 TABLET ORAL
COMMUNITY
End: 2019-08-28 | Stop reason: ALTCHOICE

## 2019-07-17 RX ORDER — LISINOPRIL 10 MG/1
40 TABLET ORAL DAILY
COMMUNITY
End: 2020-02-18 | Stop reason: SDUPTHER

## 2019-07-17 NOTE — PROGRESS NOTES
"Preventive Exam    History of Present Illness: Sandra is here for check up and review of routine health maintenance. We also addressed the following concerns:    Sandra has a history of chronic hypertension and has been well controlled on current medications.She is tolerating medications without side effect. She reports no vision changes, headaches or lightheadedness. She is requesting refills of medications.    Sandra has a history of chronic hypothyroidism and has been well controlled on current dose of replacement.She report no concerning symptoms: no cold intolerance, fatigue or hair loss.     She has a history of chronic hyperlipidemia and needs lab work today to evaluate response to therapy. She is tolerating medications well without side effects.    Sandra has a hx of depression and states that she is getting good relief from symptoms on current medication, Cymbalta. This is a chronic problem that is responding well to treatment. She  has no intolerable side effects to medication and reports that his helps lift mood and makes daily life, relationships better. No thoughts of self harm expressed.    She has a hx of chronic kidney disease and is followed by nephrology.    She had an abnormal CT of her chest and was supposed to f/u in 6 months and that did not happen. She is ready to get this done.     REVIEW OF SYSTEMS  Constitutional: Negative.    HENT: Negative.    Eyes: Negative.    Respiratory: Negative.    Cardiovascular: Negative.    Gastrointestinal: Negative.    Endocrine: Negative.    Genitourinary: Negative.    Musculoskeletal: Negative.  Skin: Negative.    Allergic/Immunologic: Negative.    Neurological: Negative.    Hematological: Negative.    Psychiatric/Behavioral: Negative.    All other systems reviewed and are negative.          PHYSICAL EXAM    Vitals:    07/17/19 1503   BP: 180/94   Pulse: 57   Resp: 16   SpO2: 98%   Weight: 112 kg (248 lb)   Height: 162.6 cm (64\")     GENERAL: alert and oriented, " afebrile and vital signs stable  HEENT: oral mucosa moist, PEERLA, EOM, conjunctiva normal  No cervical adenopathy  LUNGS: clear to ascultation bilaterally, no rales, ronchi or wheezing  HEART: RRR S1 S2 without murmers, thrills, rubs or gallops  CHEST WALL: within normal limits, no tenderness  ABDOMEN: WNL. Normal BS.  EXTREMITIES: No clubbing, cyanosis or edema noted. Normal Pulses.  SKIN: warm, dry, no rashes noted  NEURO: CN II- XII grossly intact    ASSESSMENT AND PLAN  Problem List Items Addressed This Visit        Cardiovascular and Mediastinum    Essential hypertension  Well controlled on current medication, will refill medication today and as needed. She will RTO for repeat B/P check in 6 months.    Relevant Medications    amLODIPine (NORVASC) 10 MG tablet    lisinopril (PRINIVIL,ZESTRIL) 10 MG tablet    Hyperlipidemia  Will check lipid panel today and She was advised to continue a healthy low fat diet, include regular exercise and take medications as prescribed.    Relevant Orders    Lipid Panel With / Chol / HDL Ratio (Completed)       Endocrine    Acquired hypothyroidism  Well managed on current dose of thyroid replacement. TSH ordered.  Well refill medications as needed.    Relevant Orders    TSH (Completed)       Genitourinary    CKD (chronic kidney disease)  She is following with nephrology.      Other Visit Diagnoses     Routine general medical examination at a health care facility    -  Primary    Relevant Orders    CBC (No Diff) (Completed)    Comprehensive Metabolic Panel (Completed)    Abnormal CT scan of lung        Relevant Orders    CT Chest Without Contrast    Screening for malignant neoplasm of breast        Relevant Orders    Mammo Screening Bilateral With CAD        Routine health maintenance reviewed and discussed with Sandra.    .  Orders Placed This Encounter   Procedures   • CT Chest Without Contrast     Standing Status:   Future     Standing Expiration Date:   7/17/2020   • Mammo  Screening Bilateral With CAD     Standing Status:   Future     Order Specific Question:   Reason for Exam:     Answer:   screening   • CBC (No Diff)     Order Specific Question:   [facility] Has the patient fasted?     Answer:   Yes   • Comprehensive Metabolic Panel     Order Specific Question:   [facility] Has the patient fasted?     Answer:   Yes   • Lipid Panel With / Chol / HDL Ratio     Order Specific Question:   [facility] Has the patient fasted?     Answer:   Yes   • TSH     Order Specific Question:   [facility] Has the patient fasted?     Answer:   Yes     Return in about 6 months (around 1/17/2020).

## 2019-07-17 NOTE — PATIENT INSTRUCTIONS
Annual Wellness  Personal Prevention Plan of Service   I will call you with lab results.  Date of Office Visit:  2019  Encounter Provider:  Jh Muniz MD  Place of Service:  [facility]  Patient Name: Sandra Kellogg  :  1960    As part of the Annual Wellness portion of your visit today, we are providing you with this personalized preventive plan of services (PPPS). This plan is based upon recommendations of the [place] Preventive Services Task Force (USPSTF) and the Advisory Committee on Immunization Practices (ACIP).    This lists the preventive care services that should be considered, and provides dates of when you are due. Items listed as completed are up-to-date and do not require any further intervention.    Health Maintenance   Topic Date Due   • ZOSTER VACCINE (1 of 2) 2011   • LUNG CANCER SCREENING  2019   • INFLUENZA VACCINE  2019   • ANNUAL PHYSICAL  2019   • LIPID PANEL  2020   • MAMMOGRAM  2020   • TDAP/TD VACCINES (2 - Td) 2022   • PAP SMEAR  2023   • COLONOSCOPY  2029   • HEPATITIS C SCREENING  Completed       Orders Placed This Encounter   Procedures   • CT Chest Without Contrast     Standing Status:   Future     Standing Expiration Date:   2020   • Mammo Screening Bilateral With CAD     Standing Status:   Future     Order Specific Question:   Reason for Exam:     Answer:   screening   • CBC (No Diff)   • Comprehensive Metabolic Panel   • Lipid Panel With / Chol / HDL Ratio   • TSH       Return in about 6 months (around 2020).

## 2019-07-18 LAB
ALBUMIN SERPL-MCNC: 4.4 G/DL (ref 3.5–5.5)
ALBUMIN/GLOB SERPL: 1.9 {RATIO} (ref 1.2–2.2)
ALP SERPL-CCNC: 61 IU/L (ref 39–117)
ALT SERPL-CCNC: 13 IU/L (ref 0–32)
AST SERPL-CCNC: 14 IU/L (ref 0–40)
BILIRUB SERPL-MCNC: 0.5 MG/DL (ref 0–1.2)
BUN SERPL-MCNC: 20 MG/DL (ref 6–24)
BUN/CREAT SERPL: 15 (ref 9–23)
CALCIUM SERPL-MCNC: 9.4 MG/DL (ref 8.7–10.2)
CHLORIDE SERPL-SCNC: 103 MMOL/L (ref 96–106)
CHOLEST SERPL-MCNC: 218 MG/DL (ref 100–199)
CHOLEST/HDLC SERPL: 4.2 RATIO (ref 0–4.4)
CO2 SERPL-SCNC: 25 MMOL/L (ref 20–29)
CREAT SERPL-MCNC: 1.31 MG/DL (ref 0.57–1)
ERYTHROCYTE [DISTWIDTH] IN BLOOD BY AUTOMATED COUNT: 12.8 % (ref 12.3–15.4)
GLOBULIN SER CALC-MCNC: 2.3 G/DL (ref 1.5–4.5)
GLUCOSE SERPL-MCNC: 95 MG/DL (ref 65–99)
HCT VFR BLD AUTO: 36.1 % (ref 34–46.6)
HDLC SERPL-MCNC: 52 MG/DL
HGB BLD-MCNC: 12.4 G/DL (ref 11.1–15.9)
LDLC SERPL CALC-MCNC: 143 MG/DL (ref 0–99)
MCH RBC QN AUTO: 31.2 PG (ref 26.6–33)
MCHC RBC AUTO-ENTMCNC: 34.3 G/DL (ref 31.5–35.7)
MCV RBC AUTO: 91 FL (ref 79–97)
PLATELET # BLD AUTO: 331 X10E3/UL (ref 150–450)
POTASSIUM SERPL-SCNC: 4.3 MMOL/L (ref 3.5–5.2)
PROT SERPL-MCNC: 6.7 G/DL (ref 6–8.5)
RBC # BLD AUTO: 3.97 X10E6/UL (ref 3.77–5.28)
SODIUM SERPL-SCNC: 143 MMOL/L (ref 134–144)
TRIGL SERPL-MCNC: 116 MG/DL (ref 0–149)
TSH SERPL DL<=0.005 MIU/L-ACNC: 2.21 UIU/ML (ref 0.45–4.5)
VLDLC SERPL CALC-MCNC: 23 MG/DL (ref 5–40)
WBC # BLD AUTO: 8.8 X10E3/UL (ref 3.4–10.8)

## 2019-07-22 ENCOUNTER — APPOINTMENT (OUTPATIENT)
Dept: CT IMAGING | Facility: HOSPITAL | Age: 59
End: 2019-07-22

## 2019-07-24 RX ORDER — PROMETHAZINE HYDROCHLORIDE 25 MG/1
25 TABLET ORAL EVERY 6 HOURS PRN
Qty: 30 TABLET | Refills: 2 | Status: SHIPPED | OUTPATIENT
Start: 2019-07-24 | End: 2019-09-05 | Stop reason: SDUPTHER

## 2019-08-01 RX ORDER — DULOXETIN HYDROCHLORIDE 60 MG/1
CAPSULE, DELAYED RELEASE ORAL
Qty: 90 CAPSULE | Refills: 0 | Status: SHIPPED | OUTPATIENT
Start: 2019-08-01 | End: 2019-10-26 | Stop reason: SDUPTHER

## 2019-08-07 ENCOUNTER — HOSPITAL ENCOUNTER (OUTPATIENT)
Dept: CT IMAGING | Facility: HOSPITAL | Age: 59
Discharge: HOME | End: 2019-08-07
Admitting: FAMILY MEDICINE

## 2019-08-07 DIAGNOSIS — R91.8 ABNORMAL CT SCAN OF LUNG: ICD-10-CM

## 2019-08-07 PROCEDURE — 71250 CT THORAX DX C-: CPT

## 2019-08-12 DIAGNOSIS — R91.8 PULMONARY NODULES: Primary | ICD-10-CM

## 2019-08-12 RX ORDER — FAMOTIDINE 20 MG/1
20 TABLET, FILM COATED ORAL 2 TIMES DAILY
Qty: 180 TABLET | Refills: 1 | Status: SHIPPED | OUTPATIENT
Start: 2019-08-12 | End: 2020-04-16

## 2019-08-19 DIAGNOSIS — E78.2 MIXED HYPERLIPIDEMIA: ICD-10-CM

## 2019-08-19 RX ORDER — ROSUVASTATIN CALCIUM 20 MG/1
20 TABLET, COATED ORAL DAILY
Qty: 90 TABLET | Refills: 1 | Status: SHIPPED | OUTPATIENT
Start: 2019-08-19 | End: 2019-11-20 | Stop reason: SDUPTHER

## 2019-08-27 ENCOUNTER — APPOINTMENT (OUTPATIENT)
Dept: WOMENS IMAGING | Facility: HOSPITAL | Age: 59
End: 2019-08-27

## 2019-08-27 PROCEDURE — 77067 SCR MAMMO BI INCL CAD: CPT | Performed by: RADIOLOGY

## 2019-08-28 ENCOUNTER — OFFICE VISIT (OUTPATIENT)
Dept: GASTROENTEROLOGY | Facility: CLINIC | Age: 59
End: 2019-08-28

## 2019-08-28 VITALS
WEIGHT: 247.8 LBS | SYSTOLIC BLOOD PRESSURE: 150 MMHG | DIASTOLIC BLOOD PRESSURE: 88 MMHG | HEIGHT: 64 IN | TEMPERATURE: 98.7 F | BODY MASS INDEX: 42.3 KG/M2

## 2019-08-28 DIAGNOSIS — D12.6 ADENOMATOUS POLYP OF COLON, UNSPECIFIED PART OF COLON: ICD-10-CM

## 2019-08-28 DIAGNOSIS — R11.10 POST-TUSSIVE EMESIS: ICD-10-CM

## 2019-08-28 DIAGNOSIS — R10.9 ABDOMINAL CRAMPING: ICD-10-CM

## 2019-08-28 DIAGNOSIS — K21.00 GASTROESOPHAGEAL REFLUX DISEASE WITH ESOPHAGITIS: Primary | ICD-10-CM

## 2019-08-28 PROCEDURE — 99213 OFFICE O/P EST LOW 20 MIN: CPT | Performed by: INTERNAL MEDICINE

## 2019-08-28 RX ORDER — LIDOCAINE 36 MG/1
PATCH TOPICAL
COMMUNITY
Start: 2019-08-26 | End: 2020-09-15

## 2019-08-28 RX ORDER — LAMOTRIGINE 25 MG/1
TABLET ORAL
COMMUNITY
Start: 2019-08-24 | End: 2020-08-12 | Stop reason: SDUPTHER

## 2019-08-28 RX ORDER — SUCRALFATE 1 G/1
1 TABLET ORAL
Qty: 90 TABLET | Refills: 3 | Status: SHIPPED | OUTPATIENT
Start: 2019-08-28 | End: 2019-10-25 | Stop reason: SDUPTHER

## 2019-08-28 NOTE — PROGRESS NOTES
Chief Complaint   Patient presents with   • Nausea   • Vomiting       Sandra Kellogg is a  59 y.o. female here for a follow up visit for n/v.     She was seen initially during a hospitalization in jan 2019.  GES was negative. She underwent EGD and Colonoscopy on 1/27/19. EGD showed erosive gastritis, reflux esophagitis and duodenitis. Path was negative. Colonoscopy showed TA colon polyp. She was started on Protonix 40 mg BID and Levsin for abd pain diarrhea. She was able to tolerate a low residue diet and her diarrhea did resolve. She continued to have some N&V and did have some phenergan.     She denies diarrhea.  She has been watching her diet - avoids eating late and eats small meals.  Only drinks water.  Heartburn relatively well controlled with pepcid bid.  Takes carafate prn with good results.  Has cramping at time - hyoscyamine helps.  Has to take phenergan prn for nausea.  She describes some post tussive nausea after showers due to the steam which causes cough.    HPI  Past Medical History:   Diagnosis Date   • DDD (degenerative disc disease), lumbar    • Depression    • Hyperlipidemia    • Hypertension    • Insomnia    • Metabolic syndrome    • Myocardial infarction (CMS/[facility]) 0214-2719   • Osteoarthritis      Past Surgical History:   Procedure Laterality Date   • CARPAL TUNNEL RELEASE Right    • COLONOSCOPY N/A 1/27/2019    Procedure: COLONOSCOPY INTO CECUM AND TI WITH COLD BX POLYPECTOMIES;  Surgeon: Robert Trent MD;  Location: [facility] ENDOSCOPY;  Service: Gastroenterology   • CORONARY ANGIOPLASTY WITH STENT PLACEMENT     • ENDOSCOPY N/A 1/27/2019    Procedure: ESOPHAGOGASTRODUODENOSCOPY WITH BX;  Surgeon: Robert Trent MD;  Location: [facility] ENDOSCOPY;  Service: Gastroenterology   • JOINT REPLACEMENT      BILATERAL   • KNEE ARTHROSCOPY Right    • SPINE SURGERY      L4-L5   • TOTAL KNEE ARTHROPLASTY Left    • TUBAL ABDOMINAL LIGATION     • ULNAR NERVE DECOMPRESSION Left         Current Outpatient Medications:   •  amLODIPine (NORVASC) 10 MG tablet, Take 1 tablet by mouth Daily. (Patient taking differently: 10 mg Daily.), Disp: 90 tablet, Rfl: 1  •  aspirin 81 MG tablet, Take 81 mg by mouth Daily., Disp: , Rfl:   •  Cholecalciferol (VITAMIN D3) 5000 UNITS chewable tablet, Chew 1 tablet Daily., Disp: , Rfl:   •  DULoxetine (CYMBALTA) 60 MG capsule, TAKE 1 CAPSULE BY MOUTH EVERY DAY, Disp: 90 capsule, Rfl: 0  •  famotidine (PEPCID) 20 MG tablet, Take 1 tablet by mouth 2 (Two) Times a Day., Disp: 180 tablet, Rfl: 1  •  hyoscyamine (LEVSIN) 0.125 MG SL tablet, Take 1 tablet by mouth Every 6 (Six) Hours As Needed for Cramping., Disp: 360 tablet, Rfl: 3  •  isosorbide mononitrate (IMDUR) 60 MG 24 hr tablet, Take 1 tablet by mouth Daily., Disp: 90 tablet, Rfl: 3  •  levothyroxine (SYNTHROID, LEVOTHROID) 125 MCG tablet, TAKE 1 TABLET BY MOUTH EVERY DAY, Disp: 90 tablet, Rfl: 1  •  lisinopril (PRINIVIL,ZESTRIL) 10 MG tablet, Take 40 mg by mouth Daily., Disp: , Rfl:   •  metoprolol succinate XL (TOPROL-XL) 50 MG 24 hr tablet, TAKE 1 TABLET DAILY, Disp: 90 tablet, Rfl: 1  •  Omega-3 Fatty Acids (OMEGA-3 FISH OIL PO), Take 1 tablet by mouth Daily., Disp: , Rfl:   •  oxybutynin XL (DITROPAN-XL) 5 MG 24 hr tablet, TAKE 1 TABLET BY MOUTH EVERY DAY, Disp: 90 tablet, Rfl: 1  •  oxyCODONE-acetaminophen (PERCOCET)  MG per tablet, 1 po q4h prn, Disp: , Rfl:   •  promethazine (PHENERGAN) 25 MG tablet, TAKE 1 TABLET BY MOUTH EVERY 6 (SIX) HOURS AS NEEDED FOR NAUSEA OR VOMITING., Disp: 30 tablet, Rfl: 2  •  rosuvastatin (CRESTOR) 20 MG tablet, Take 1 tablet by mouth Daily., Disp: 90 tablet, Rfl: 1  •  tiZANidine (ZANAFLEX) 4 MG tablet, Take 4 mg by mouth 3 (Three) Times a Day., Disp: , Rfl:   •  zolpidem (AMBIEN) 10 MG tablet, Take 10 mg by mouth Daily., Disp: , Rfl:   •  lamoTRIgine (LaMICtal) 25 MG tablet, , Disp: , Rfl:   •  sucralfate (CARAFATE) 1 g tablet, Take 1 tablet by mouth 3 (Three) Times a  Day With Meals., Disp: 90 tablet, Rfl: 3  •  ZTLIDO 1.8 % patch, , Disp: , Rfl:   PRN Meds:.  Allergies   Allergen Reactions   • Metformin Other (See Comments)     Kidney failure       Social History     Socioeconomic History   • Marital status:      Spouse name: Not on file   • Number of children: 3   • Years of education: college   • Highest education level: Not on file   Occupational History   • Occupation: disables   Tobacco Use   • Smoking status: Former Smoker     Packs/day: 1.50     Years: 32.00     Pack years: 48.00     Types: Cigarettes     Start date: 1976     Last attempt to quit: 1/1/2009     Years since quitting: 10.6   • Smokeless tobacco: Never Used   Substance and Sexual Activity   • Alcohol use: No   • Drug use: No   • Sexual activity: No     Partners: Male   Social History Narrative    Lives at home with ex-     Family History   Problem Relation Age of Onset   • Hypertension Mother    • Diabetes Mother    • Lung cancer Father    • No Known Problems Sister    • No Known Problems Brother    • Breast cancer Paternal Aunt    • Esophageal cancer Paternal Aunt      Review of Systems   Constitutional: Negative for appetite change and unexpected weight change.   HENT: Negative for trouble swallowing.    Respiratory: Positive for cough.    Gastrointestinal: Positive for abdominal pain and nausea. Negative for blood in stool and diarrhea.   Musculoskeletal: Positive for gait problem.   All other systems reviewed and are negative.    Vitals:    08/28/19 1529   BP: 150/88   Temp: 98.7 °F (37.1 °C)         08/28/19  1529   Weight: 112 kg (247 lb 12.8 oz)     Physical Exam   Constitutional: She appears well-developed and well-nourished.   HENT:   Head: Normocephalic and atraumatic.   Eyes: No scleral icterus.   Pulmonary/Chest: Effort normal.   Abdominal: Soft. She exhibits no distension.   Neurological: She is alert.   Skin: Skin is warm and dry.   Psychiatric: She has a normal mood and affect.      No images are attached to the encounter.  Diagnoses and all orders for this visit:    Gastroesophageal reflux disease with esophagitis    Post-tussive emesis    Abdominal cramping    Adenomatous polyp of colon, unspecified part of colon    Other orders  -     lamoTRIgine (LaMICtal) 25 MG tablet  -     ZTLIDO 1.8 % patch  -     sucralfate (CARAFATE) 1 g tablet; Take 1 tablet by mouth 3 (Three) Times a Day With Meals.    Plan:  · Cont pepcid bid with mild renal insuff, carafate prn  · Use hyoscyamine prn for cramping - bms fairly regular  · Nausea likely multifactorial - gerd, meds. Etc - prn antinausea medication  · Repeat c/s 2024 due to hx of polyps

## 2019-08-29 DIAGNOSIS — Z12.39 SCREENING FOR MALIGNANT NEOPLASM OF BREAST: ICD-10-CM

## 2019-09-11 RX ORDER — PROMETHAZINE HYDROCHLORIDE 25 MG/1
25 TABLET ORAL EVERY 6 HOURS PRN
Qty: 30 TABLET | Refills: 2 | Status: SHIPPED | OUTPATIENT
Start: 2019-09-11 | End: 2019-10-18 | Stop reason: SDUPTHER

## 2019-09-25 RX ORDER — SUCRALFATE 1 G/10ML
SUSPENSION ORAL
Qty: 3600 ML | Refills: 0 | OUTPATIENT
Start: 2019-09-25

## 2019-10-17 ENCOUNTER — CLINICAL SUPPORT (OUTPATIENT)
Dept: FAMILY MEDICINE CLINIC | Facility: CLINIC | Age: 59
End: 2019-10-17

## 2019-10-17 DIAGNOSIS — Z23 NEED FOR IMMUNIZATION AGAINST INFLUENZA: Primary | ICD-10-CM

## 2019-10-17 PROCEDURE — 90674 CCIIV4 VAC NO PRSV 0.5 ML IM: CPT | Performed by: FAMILY MEDICINE

## 2019-10-17 PROCEDURE — 90471 IMMUNIZATION ADMIN: CPT | Performed by: FAMILY MEDICINE

## 2019-10-18 RX ORDER — PROMETHAZINE HYDROCHLORIDE 25 MG/1
25 TABLET ORAL EVERY 6 HOURS PRN
Qty: 30 TABLET | Refills: 2 | Status: SHIPPED | OUTPATIENT
Start: 2019-10-18 | End: 2022-06-07 | Stop reason: HOSPADM

## 2019-10-25 ENCOUNTER — TELEPHONE (OUTPATIENT)
Dept: GASTROENTEROLOGY | Facility: CLINIC | Age: 59
End: 2019-10-25

## 2019-10-25 RX ORDER — SUCRALFATE 1 G/1
1 TABLET ORAL
Qty: 270 TABLET | Refills: 0 | Status: SHIPPED | OUTPATIENT
Start: 2019-10-25 | End: 2020-01-21

## 2019-10-25 NOTE — TELEPHONE ENCOUNTER
Faxed request received from [facility] for sucralfate 1 gm - take 1 tab by mouth 3x/day with meals.  Note from pharmacy states that insurance requires 90 day supply - only 2 mo's left on rx -requesting new rx.    See o/v note of 8/28.  Pt to have f/u around 2/28/20.    Escribe completed as above, #270, R0.

## 2019-10-28 RX ORDER — DULOXETIN HYDROCHLORIDE 60 MG/1
CAPSULE, DELAYED RELEASE ORAL
Qty: 90 CAPSULE | Refills: 0 | Status: SHIPPED | OUTPATIENT
Start: 2019-10-28 | End: 2019-11-20 | Stop reason: SDUPTHER

## 2019-10-28 RX ORDER — AMLODIPINE BESYLATE 10 MG/1
TABLET ORAL
Qty: 90 TABLET | Refills: 1 | Status: SHIPPED | OUTPATIENT
Start: 2019-10-28 | End: 2019-11-20 | Stop reason: SDUPTHER

## 2019-10-29 RX ORDER — DULOXETIN HYDROCHLORIDE 60 MG/1
CAPSULE, DELAYED RELEASE ORAL
Qty: 90 CAPSULE | Refills: 0 | OUTPATIENT
Start: 2019-10-29

## 2019-11-20 DIAGNOSIS — E78.2 MIXED HYPERLIPIDEMIA: ICD-10-CM

## 2019-11-20 DIAGNOSIS — I10 ESSENTIAL HYPERTENSION: Primary | ICD-10-CM

## 2019-11-20 RX ORDER — DULOXETIN HYDROCHLORIDE 60 MG/1
60 CAPSULE, DELAYED RELEASE ORAL DAILY
Qty: 90 CAPSULE | Refills: 0 | Status: SHIPPED | OUTPATIENT
Start: 2019-11-20 | End: 2020-01-09

## 2019-11-20 RX ORDER — METOPROLOL SUCCINATE 50 MG/1
50 TABLET, EXTENDED RELEASE ORAL DAILY
Qty: 90 TABLET | Refills: 1 | Status: SHIPPED | OUTPATIENT
Start: 2019-11-20 | End: 2020-01-28

## 2019-11-20 RX ORDER — LEVOTHYROXINE SODIUM 0.12 MG/1
125 TABLET ORAL DAILY
Qty: 90 TABLET | Refills: 1 | Status: SHIPPED | OUTPATIENT
Start: 2019-11-20 | End: 2020-01-07

## 2019-11-20 RX ORDER — AMLODIPINE BESYLATE 10 MG/1
10 TABLET ORAL DAILY
Qty: 90 TABLET | Refills: 1 | Status: SHIPPED | OUTPATIENT
Start: 2019-11-20 | End: 2020-01-28

## 2019-11-20 RX ORDER — ROSUVASTATIN CALCIUM 20 MG/1
20 TABLET, COATED ORAL DAILY
Qty: 90 TABLET | Refills: 1 | Status: SHIPPED | OUTPATIENT
Start: 2019-11-20 | End: 2020-01-28

## 2020-01-07 RX ORDER — LEVOTHYROXINE SODIUM 0.12 MG/1
TABLET ORAL
Qty: 90 TABLET | Refills: 1 | Status: SHIPPED | OUTPATIENT
Start: 2020-01-07 | End: 2020-01-28

## 2020-01-08 DIAGNOSIS — N39.41 URGE INCONTINENCE OF URINE: ICD-10-CM

## 2020-01-08 RX ORDER — OXYBUTYNIN CHLORIDE 5 MG/1
TABLET, EXTENDED RELEASE ORAL
Qty: 90 TABLET | Refills: 1 | Status: SHIPPED | OUTPATIENT
Start: 2020-01-08 | End: 2020-01-27

## 2020-01-09 RX ORDER — DULOXETIN HYDROCHLORIDE 60 MG/1
CAPSULE, DELAYED RELEASE ORAL
Qty: 90 CAPSULE | Refills: 1 | Status: SHIPPED | OUTPATIENT
Start: 2020-01-09 | End: 2020-05-29

## 2020-01-17 ENCOUNTER — OFFICE VISIT (OUTPATIENT)
Dept: FAMILY MEDICINE CLINIC | Facility: CLINIC | Age: 60
End: 2020-01-17

## 2020-01-17 VITALS
BODY MASS INDEX: 40.46 KG/M2 | SYSTOLIC BLOOD PRESSURE: 134 MMHG | RESPIRATION RATE: 16 BRPM | HEART RATE: 64 BPM | WEIGHT: 237 LBS | DIASTOLIC BLOOD PRESSURE: 64 MMHG | HEIGHT: 64 IN | OXYGEN SATURATION: 98 %

## 2020-01-17 DIAGNOSIS — E78.2 MIXED HYPERLIPIDEMIA: ICD-10-CM

## 2020-01-17 DIAGNOSIS — N28.9 RENAL INSUFFICIENCY: ICD-10-CM

## 2020-01-17 DIAGNOSIS — E03.9 ACQUIRED HYPOTHYROIDISM: ICD-10-CM

## 2020-01-17 DIAGNOSIS — I10 ESSENTIAL HYPERTENSION: Primary | ICD-10-CM

## 2020-01-17 DIAGNOSIS — F34.1 DYSTHYMIA: ICD-10-CM

## 2020-01-17 PROBLEM — S52.123A RADIAL HEAD FRACTURE: Status: ACTIVE | Noted: 2019-08-08

## 2020-01-17 LAB
ERYTHROCYTE [DISTWIDTH] IN BLOOD BY AUTOMATED COUNT: 13.1 % (ref 12.3–15.4)
HCT VFR BLD AUTO: 35.6 % (ref 34–46.6)
HGB BLD-MCNC: 11.7 G/DL (ref 12–15.9)
MCH RBC QN AUTO: 29.9 PG (ref 26.6–33)
MCHC RBC AUTO-ENTMCNC: 32.9 G/DL (ref 31.5–35.7)
MCV RBC AUTO: 91 FL (ref 79–97)
PLATELET # BLD AUTO: 328 10*3/MM3 (ref 140–450)
RBC # BLD AUTO: 3.91 10*6/MM3 (ref 3.77–5.28)
WBC # BLD AUTO: 8.06 10*3/MM3 (ref 3.4–10.8)

## 2020-01-17 PROCEDURE — 99214 OFFICE O/P EST MOD 30 MIN: CPT | Performed by: FAMILY MEDICINE

## 2020-01-17 RX ORDER — HYDRALAZINE HYDROCHLORIDE 25 MG/1
1 TABLET, FILM COATED ORAL DAILY
COMMUNITY
Start: 2019-11-21 | End: 2020-09-15 | Stop reason: SDUPTHER

## 2020-01-18 LAB
ALBUMIN SERPL-MCNC: 4.4 G/DL (ref 3.5–5.2)
ALBUMIN/GLOB SERPL: 2 G/DL
ALP SERPL-CCNC: 62 U/L (ref 39–117)
ALT SERPL-CCNC: 14 U/L (ref 1–33)
AST SERPL-CCNC: 15 U/L (ref 1–32)
BILIRUB SERPL-MCNC: 0.4 MG/DL (ref 0.2–1.2)
BUN SERPL-MCNC: 28 MG/DL (ref 6–20)
BUN/CREAT SERPL: 18.3 (ref 7–25)
CALCIUM SERPL-MCNC: 9.6 MG/DL (ref 8.6–10.5)
CHLORIDE SERPL-SCNC: 102 MMOL/L (ref 98–107)
CHOLEST SERPL-MCNC: 219 MG/DL (ref 0–200)
CHOLEST/HDLC SERPL: 4.13 {RATIO}
CO2 SERPL-SCNC: 26.2 MMOL/L (ref 22–29)
CREAT SERPL-MCNC: 1.53 MG/DL (ref 0.57–1)
GLOBULIN SER CALC-MCNC: 2.2 GM/DL
GLUCOSE SERPL-MCNC: 95 MG/DL (ref 65–99)
HDLC SERPL-MCNC: 53 MG/DL (ref 40–60)
LDLC SERPL CALC-MCNC: 138 MG/DL (ref 0–100)
POTASSIUM SERPL-SCNC: 4.5 MMOL/L (ref 3.5–5.2)
PROT SERPL-MCNC: 6.6 G/DL (ref 6–8.5)
SODIUM SERPL-SCNC: 141 MMOL/L (ref 136–145)
TRIGL SERPL-MCNC: 142 MG/DL (ref 0–150)
TSH SERPL DL<=0.005 MIU/L-ACNC: 4.06 UIU/ML (ref 0.27–4.2)
VLDLC SERPL CALC-MCNC: 28.4 MG/DL

## 2020-01-21 RX ORDER — SUCRALFATE 1 G/1
TABLET ORAL
Qty: 270 TABLET | Refills: 0 | Status: SHIPPED | OUTPATIENT
Start: 2020-01-21 | End: 2020-01-27 | Stop reason: SDUPTHER

## 2020-01-27 ENCOUNTER — TELEPHONE (OUTPATIENT)
Dept: GASTROENTEROLOGY | Facility: CLINIC | Age: 60
End: 2020-01-27

## 2020-01-27 DIAGNOSIS — N39.41 URGE INCONTINENCE OF URINE: Primary | ICD-10-CM

## 2020-01-27 DIAGNOSIS — E78.2 MIXED HYPERLIPIDEMIA: ICD-10-CM

## 2020-01-27 RX ORDER — SUCRALFATE 1 G/1
1 TABLET ORAL
Qty: 270 TABLET | Refills: 0 | Status: SHIPPED | OUTPATIENT
Start: 2020-01-27 | End: 2020-05-13 | Stop reason: SDUPTHER

## 2020-01-27 RX ORDER — OXYBUTYNIN CHLORIDE 5 MG/1
5 TABLET ORAL 2 TIMES DAILY
Qty: 180 TABLET | Refills: 3 | Status: SHIPPED | OUTPATIENT
Start: 2020-01-27 | End: 2020-12-14

## 2020-01-27 NOTE — TELEPHONE ENCOUNTER
----- Message from Sandra Kellogg sent at 1/27/2020 12:01 PM EST -----  Regarding: Prescription Question  Contact: [phone]  I need my prescription of sucralfate 1 gram tablets to be sent to [facility]. I am now on [facility] Medicare Advantage insurance and there is no cost to me for this by mail order with 90 day supply. It is quite expensive at the regular pharmacies.     Thank you  Sandra Iverson

## 2020-01-28 RX ORDER — ROSUVASTATIN CALCIUM 20 MG/1
TABLET, COATED ORAL
Qty: 90 TABLET | Refills: 1 | Status: SHIPPED | OUTPATIENT
Start: 2020-01-28 | End: 2020-09-15 | Stop reason: DRUGHIGH

## 2020-01-28 RX ORDER — LEVOTHYROXINE SODIUM 0.12 MG/1
TABLET ORAL
Qty: 90 TABLET | Refills: 1 | Status: SHIPPED | OUTPATIENT
Start: 2020-01-28 | End: 2021-02-22

## 2020-01-28 RX ORDER — METOPROLOL SUCCINATE 50 MG/1
TABLET, EXTENDED RELEASE ORAL
Qty: 90 TABLET | Refills: 1 | Status: SHIPPED | OUTPATIENT
Start: 2020-01-28

## 2020-01-28 RX ORDER — AMLODIPINE BESYLATE 10 MG/1
TABLET ORAL
Qty: 90 TABLET | Refills: 1 | Status: SHIPPED | OUTPATIENT
Start: 2020-01-28 | End: 2021-02-21 | Stop reason: SDUPTHER

## 2020-01-31 ENCOUNTER — TELEPHONE (OUTPATIENT)
Dept: GASTROENTEROLOGY | Facility: CLINIC | Age: 60
End: 2020-01-31

## 2020-01-31 NOTE — TELEPHONE ENCOUNTER
Faxed request received from [facility] for hyoscyamine 0.125 mg sl tab.  See o/v note of 8/28/19 and note of 1/27.    Escribe completed as above - 1 tab by mouth every 6 hours prn cramping, #360. R1.

## 2020-02-06 ENCOUNTER — TELEPHONE (OUTPATIENT)
Dept: GASTROENTEROLOGY | Facility: CLINIC | Age: 60
End: 2020-02-06

## 2020-02-06 NOTE — TELEPHONE ENCOUNTER
Call from Don at [facility] requesting clarification on carafate tabs.  States usually dosed 1 hr before meals.  Authorize this change.

## 2020-02-11 RX ORDER — ISOSORBIDE MONONITRATE 60 MG/1
60 TABLET, EXTENDED RELEASE ORAL DAILY
Qty: 90 TABLET | Refills: 3 | Status: SHIPPED | OUTPATIENT
Start: 2020-02-11 | End: 2020-12-21

## 2020-02-18 RX ORDER — LISINOPRIL 40 MG/1
40 TABLET ORAL DAILY
Qty: 90 TABLET | Refills: 1 | Status: SHIPPED | OUTPATIENT
Start: 2020-02-18 | End: 2020-05-14 | Stop reason: SDUPTHER

## 2020-04-16 RX ORDER — FAMOTIDINE 20 MG/1
TABLET, FILM COATED ORAL
Qty: 180 TABLET | Refills: 0 | Status: SHIPPED | OUTPATIENT
Start: 2020-04-16 | End: 2020-05-13 | Stop reason: SDUPTHER

## 2020-04-20 RX ORDER — FAMOTIDINE 20 MG/1
TABLET, FILM COATED ORAL
Qty: 180 TABLET | Refills: 0 | OUTPATIENT
Start: 2020-04-20

## 2020-05-11 ENCOUNTER — TELEPHONE (OUTPATIENT)
Dept: GASTROENTEROLOGY | Facility: CLINIC | Age: 60
End: 2020-05-11

## 2020-05-11 NOTE — TELEPHONE ENCOUNTER
----- Message from Sandra Kellogg sent at 5/11/2020  8:30 AM EDT -----  Regarding: Prescription Question  Contact: [phone]  I need refill of my carafate tablets medication for 90 days sent to [facility] pharmacy. Thanks

## 2020-05-11 NOTE — TELEPHONE ENCOUNTER
"See o/v note of 8/28/19 - \"return in 6 mo's - around 2/28/20\".     Call to pt.  Advise of above.  Appt scheduled for 5/13 @ 1:15 pm with AVELINA Ash.  States has enough sucralfate to last til then.     Update to Dr Winston.   "

## 2020-05-13 ENCOUNTER — OFFICE VISIT (OUTPATIENT)
Dept: GASTROENTEROLOGY | Facility: CLINIC | Age: 60
End: 2020-05-13

## 2020-05-13 VITALS — BODY MASS INDEX: 41.66 KG/M2 | HEIGHT: 64 IN | WEIGHT: 244 LBS | TEMPERATURE: 97 F

## 2020-05-13 DIAGNOSIS — D12.6 ADENOMATOUS POLYP OF COLON, UNSPECIFIED PART OF COLON: ICD-10-CM

## 2020-05-13 DIAGNOSIS — K29.80 DUODENITIS: Primary | ICD-10-CM

## 2020-05-13 DIAGNOSIS — K21.00 GASTROESOPHAGEAL REFLUX DISEASE WITH ESOPHAGITIS: ICD-10-CM

## 2020-05-13 PROCEDURE — 99214 OFFICE O/P EST MOD 30 MIN: CPT | Performed by: NURSE PRACTITIONER

## 2020-05-13 RX ORDER — FAMOTIDINE 20 MG/1
20 TABLET, FILM COATED ORAL 2 TIMES DAILY
Qty: 180 TABLET | Refills: 3 | Status: SHIPPED | OUTPATIENT
Start: 2020-05-13 | End: 2020-09-14 | Stop reason: SDUPTHER

## 2020-05-13 RX ORDER — NITROGLYCERIN 0.4 MG/1
0.4 TABLET SUBLINGUAL AS NEEDED
COMMUNITY
Start: 2020-05-05 | End: 2020-10-09 | Stop reason: SDUPTHER

## 2020-05-13 RX ORDER — SUCRALFATE 1 G/1
1 TABLET ORAL
Qty: 270 TABLET | Refills: 3 | Status: SHIPPED | OUTPATIENT
Start: 2020-05-13 | End: 2020-10-09 | Stop reason: SDUPTHER

## 2020-05-13 NOTE — PROGRESS NOTES
Chief Complaint   Patient presents with   • Follow-up   • Med Refill       Sandra Kellogg is a  59 y.o. female here for a follow up visit for GERD.    HPI  59-year-old female presents today for follow-up visit for GERD.  She is a patient of Dr. Winston.  She was last seen in the office on 8/28/2019.  She has a history of GERD/esophagitis/duodenitis and admits she does well on Pepcid 20 mg twice daily and Carafate 1-2 times a day as needed.  She denies any breakthrough reflux at this time.  She reports her bowels are moving well currently.  She does have a history of adenomatous colon polyps.  Her last colonoscopy and EGD were done on 1/27/2019.  She denies any dysphagia, reflux, abdominal pain, nausea vomiting, diarrhea, constipation, rectal bleeding or melena.  She is appetite is good and her weight is stable.  Past Medical History:   Diagnosis Date   • DDD (degenerative disc disease), lumbar    • Depression    • Hyperlipidemia    • Hypertension    • Insomnia    • Metabolic syndrome    • Myocardial infarction (CMS/[facility]) 4366-3250   • Osteoarthritis        Past Surgical History:   Procedure Laterality Date   • CARPAL TUNNEL RELEASE Right    • COLONOSCOPY N/A 1/27/2019    Procedure: COLONOSCOPY INTO CECUM AND TI WITH COLD BX POLYPECTOMIES;  Surgeon: Robert Trent MD;  Location: [facility] ENDOSCOPY;  Service: Gastroenterology   • CORONARY ANGIOPLASTY WITH STENT PLACEMENT     • ENDOSCOPY N/A 1/27/2019    Procedure: ESOPHAGOGASTRODUODENOSCOPY WITH BX;  Surgeon: Robert Trent MD;  Location: [facility] ENDOSCOPY;  Service: Gastroenterology   • JOINT REPLACEMENT      BILATERAL   • KNEE ARTHROSCOPY Right    • SPINE SURGERY      L4-L5   • TOTAL KNEE ARTHROPLASTY Left    • TUBAL ABDOMINAL LIGATION     • ULNAR NERVE DECOMPRESSION Left        Scheduled Meds:    Continuous Infusions:  No current facility-administered medications for this visit.     PRN Meds:.    Allergies   Allergen Reactions   • Metformin Other  (See Comments)     Kidney failure     • Clindamycin/Lincomycin GI Intolerance       Social History     Socioeconomic History   • Marital status:      Spouse name: Not on file   • Number of children: 3   • Years of education: college   • Highest education level: Not on file   Occupational History   • Occupation: disables   Tobacco Use   • Smoking status: Former Smoker     Packs/day: 1.50     Years: 32.00     Pack years: 48.00     Types: Cigarettes     Start date:      Last attempt to quit: 2009     Years since quittin.3   • Smokeless tobacco: Never Used   Substance and Sexual Activity   • Alcohol use: No   • Drug use: No   • Sexual activity: Never     Partners: Male   Social History Narrative    Lives at home with ex-       Family History   Problem Relation Age of Onset   • Hypertension Mother    • Diabetes Mother    • Lung cancer Father    • No Known Problems Sister    • No Known Problems Brother    • Breast cancer Paternal Aunt    • Esophageal cancer Paternal Aunt        Review of Systems   Constitutional: Negative for appetite change, chills, diaphoresis, fatigue, fever and unexpected weight change.   HENT: Negative for nosebleeds, postnasal drip, sore throat, trouble swallowing and voice change.    Respiratory: Negative for cough, choking, chest tightness, shortness of breath and wheezing.    Cardiovascular: Negative for chest pain, palpitations and leg swelling.   Gastrointestinal: Positive for abdominal distention. Negative for abdominal pain, anal bleeding, blood in stool, constipation, diarrhea, nausea, rectal pain and vomiting.   Endocrine: Negative for polydipsia, polyphagia and polyuria.   Musculoskeletal: Negative for gait problem.   Skin: Negative for rash and wound.   Allergic/Immunologic: Negative for food allergies.   Neurological: Negative for dizziness, speech difficulty and light-headedness.   Psychiatric/Behavioral: Negative for confusion, self-injury, sleep disturbance  and suicidal ideas.       Vitals:    05/13/20 1309   Temp: 97 °F (36.1 °C)       Physical Exam   Constitutional: She is oriented to person, place, and time. She appears well-developed and well-nourished. She does not appear ill. No distress.   HENT:   Head: Normocephalic.   Eyes: Pupils are equal, round, and reactive to light.   Cardiovascular: Normal rate, regular rhythm and normal heart sounds.   Pulmonary/Chest: Effort normal and breath sounds normal.   Abdominal: Soft. Bowel sounds are normal. She exhibits distension. She exhibits no mass. There is no hepatosplenomegaly. There is no tenderness. There is no rebound and no guarding. No hernia.   Musculoskeletal: Normal range of motion.   Ambulates with a cane   Neurological: She is alert and oriented to person, place, and time.   Skin: Skin is warm and dry.   Psychiatric: She has a normal mood and affect. Her speech is normal and behavior is normal. Judgment normal.       No radiology results for the last 7 days     Assessment and plan     1. Duodenitis    2. Gastroesophageal reflux disease with esophagitis    3. Adenomatous polyp of colon, unspecified part of colon      GERD seems well controlled on Pepcid 20 mg twice daily and Carafate 1-2 times daily PRN.  Continue GERD precautions.  Bowels seem to be moving well at this time.  Next screening colonoscopy will be due in 2024.  Patient to call the office with any issues.  Patient to follow-up with me in 6 months.

## 2020-05-14 DIAGNOSIS — I10 ESSENTIAL HYPERTENSION: Primary | ICD-10-CM

## 2020-05-14 RX ORDER — LISINOPRIL 40 MG/1
40 TABLET ORAL DAILY
Qty: 90 TABLET | Refills: 0 | Status: SHIPPED | OUTPATIENT
Start: 2020-05-14 | End: 2020-09-08

## 2020-05-29 RX ORDER — DULOXETIN HYDROCHLORIDE 60 MG/1
CAPSULE, DELAYED RELEASE ORAL
Qty: 90 CAPSULE | Refills: 1 | Status: SHIPPED | OUTPATIENT
Start: 2020-05-29 | End: 2020-10-09

## 2020-08-12 RX ORDER — LAMOTRIGINE 25 MG/1
TABLET ORAL
Qty: 180 TABLET | Refills: 1 | Status: SHIPPED | OUTPATIENT
Start: 2020-08-12

## 2020-08-15 ENCOUNTER — INPATIENT HOSPITAL (OUTPATIENT)
Dept: URBAN - METROPOLITAN AREA HOSPITAL 107 | Facility: HOSPITAL | Age: 60
End: 2020-08-15
Payer: MEDICARE

## 2020-08-15 DIAGNOSIS — B96.81 HELICOBACTER PYLORI [H. PYLORI] AS THE CAUSE OF DISEASES CLA: ICD-10-CM

## 2020-08-15 DIAGNOSIS — K55.9 VASCULAR DISORDER OF INTESTINE, UNSPECIFIED: ICD-10-CM

## 2020-08-15 DIAGNOSIS — R10.33 PERIUMBILICAL PAIN: ICD-10-CM

## 2020-08-15 DIAGNOSIS — K29.40 CHRONIC ATROPHIC GASTRITIS WITHOUT BLEEDING: ICD-10-CM

## 2020-08-15 DIAGNOSIS — R19.5 OTHER FECAL ABNORMALITIES: ICD-10-CM

## 2020-08-15 PROCEDURE — 99223 1ST HOSP IP/OBS HIGH 75: CPT | Performed by: INTERNAL MEDICINE

## 2020-08-16 ENCOUNTER — INPATIENT HOSPITAL (OUTPATIENT)
Dept: URBAN - METROPOLITAN AREA HOSPITAL 107 | Facility: HOSPITAL | Age: 60
End: 2020-08-16
Payer: MEDICARE

## 2020-08-16 DIAGNOSIS — R74.8 ABNORMAL LEVELS OF OTHER SERUM ENZYMES: ICD-10-CM

## 2020-08-16 DIAGNOSIS — R19.5 OTHER FECAL ABNORMALITIES: ICD-10-CM

## 2020-08-16 DIAGNOSIS — I10 ESSENTIAL (PRIMARY) HYPERTENSION: ICD-10-CM

## 2020-08-16 DIAGNOSIS — R65.20 SEVERE SEPSIS WITHOUT SEPTIC SHOCK: ICD-10-CM

## 2020-08-16 DIAGNOSIS — N28.9 DISORDER OF KIDNEY AND URETER, UNSPECIFIED: ICD-10-CM

## 2020-08-16 DIAGNOSIS — E03.9 HYPOTHYROIDISM, UNSPECIFIED: ICD-10-CM

## 2020-08-16 PROCEDURE — 99232 SBSQ HOSP IP/OBS MODERATE 35: CPT | Performed by: INTERNAL MEDICINE

## 2020-08-18 ENCOUNTER — TELEPHONE (OUTPATIENT)
Dept: FAMILY MEDICINE CLINIC | Facility: CLINIC | Age: 60
End: 2020-08-18

## 2020-08-18 NOTE — TELEPHONE ENCOUNTER
Please call Sandra, it is time for a repeat CT of her chest and she needs to come in for an Annual Physical.

## 2020-09-08 ENCOUNTER — OFFICE VISIT (OUTPATIENT)
Dept: GASTROENTEROLOGY | Facility: CLINIC | Age: 60
End: 2020-09-08

## 2020-09-08 VITALS
DIASTOLIC BLOOD PRESSURE: 70 MMHG | SYSTOLIC BLOOD PRESSURE: 110 MMHG | WEIGHT: 229.4 LBS | BODY MASS INDEX: 36.87 KG/M2 | TEMPERATURE: 98 F | HEIGHT: 66 IN

## 2020-09-08 DIAGNOSIS — Z98.890 S/P EXPLORATORY LAPAROTOMY: ICD-10-CM

## 2020-09-08 DIAGNOSIS — A41.4: Primary | ICD-10-CM

## 2020-09-08 DIAGNOSIS — R93.3 ABNORMAL CT SCAN, COLON: ICD-10-CM

## 2020-09-08 DIAGNOSIS — K92.2 GASTROINTESTINAL HEMORRHAGE, UNSPECIFIED GASTROINTESTINAL HEMORRHAGE TYPE: ICD-10-CM

## 2020-09-08 DIAGNOSIS — R65.21: Primary | ICD-10-CM

## 2020-09-08 DIAGNOSIS — I10 ESSENTIAL HYPERTENSION: ICD-10-CM

## 2020-09-08 PROCEDURE — 99214 OFFICE O/P EST MOD 30 MIN: CPT | Performed by: NURSE PRACTITIONER

## 2020-09-08 RX ORDER — ISOSORBIDE MONONITRATE 60 MG/1
60 TABLET, EXTENDED RELEASE ORAL DAILY
COMMUNITY
Start: 2020-05-05 | End: 2020-09-15 | Stop reason: SDUPTHER

## 2020-09-08 RX ORDER — METOPROLOL SUCCINATE 100 MG/1
100 TABLET, EXTENDED RELEASE ORAL DAILY
COMMUNITY
Start: 2020-06-26 | End: 2020-09-15 | Stop reason: SDUPTHER

## 2020-09-08 RX ORDER — OXYCODONE AND ACETAMINOPHEN 10; 325 MG/1; MG/1
10 TABLET ORAL 4 TIMES DAILY PRN
COMMUNITY
Start: 2020-09-05 | End: 2020-09-15

## 2020-09-08 RX ORDER — HYDRALAZINE HYDROCHLORIDE 25 MG/1
25 TABLET, FILM COATED ORAL 2 TIMES DAILY
COMMUNITY

## 2020-09-08 RX ORDER — PANTOPRAZOLE SODIUM 40 MG/1
40 TABLET, DELAYED RELEASE ORAL DAILY
COMMUNITY
Start: 2020-08-25 | End: 2020-09-15

## 2020-09-08 RX ORDER — DICYCLOMINE HCL 20 MG
20 TABLET ORAL 4 TIMES DAILY PRN
Qty: 90 TABLET | Refills: 3 | Status: SHIPPED | OUTPATIENT
Start: 2020-09-08 | End: 2021-10-12

## 2020-09-08 RX ORDER — DICYCLOMINE HCL 20 MG
20 TABLET ORAL 4 TIMES DAILY PRN
COMMUNITY
Start: 2020-08-25 | End: 2020-09-08 | Stop reason: SDUPTHER

## 2020-09-08 RX ORDER — LISINOPRIL 40 MG/1
TABLET ORAL
Qty: 90 TABLET | Refills: 1 | Status: SHIPPED | OUTPATIENT
Start: 2020-09-08 | End: 2021-02-08

## 2020-09-08 RX ORDER — ROSUVASTATIN CALCIUM 40 MG/1
40 TABLET, COATED ORAL DAILY
COMMUNITY
Start: 2020-05-05 | End: 2022-06-07 | Stop reason: HOSPADM

## 2020-09-08 RX ORDER — LEVOTHYROXINE SODIUM 0.05 MG/1
125 TABLET ORAL DAILY
COMMUNITY
End: 2020-09-15 | Stop reason: DRUGHIGH

## 2020-09-08 NOTE — PROGRESS NOTES
Chief Complaint   Patient presents with   • GI Bleeding     sp Caroline with GI Bleed - 8/14- 8/25   • Diarrhea     sp c diff       Sandra Kellogg is a  60 y.o. female here for a hospital follow up visit for C. difficile colitis.    HPI  60-year-old female presents today for hospital follow-up visit for C. difficile colitis.  She is a patient of Dr. Winston.She was recently hospitalized at [facility] from 8/14 through 8/25/2020.  She was diagnosed there with septic shock, C. difficile colitis, GI bleed, low cortisol levels, acute kidney injury and is status post exploratory laparotomy.  She tells me previously before this hospitalization she was on antibiotics for dental issues.  She tells me she did spend 3 days in the ICU at [facility].  She tells me they told her she was close to death.  She is happy to report since being home that she is feeling much better.  She is still quite fatigued but the diarrhea has completely stopped.  She was positive for C. difficile via GI PCR.  Toxin was however negative.  She did have a CT scan of the abdomen and pelvis done which was suspicious for a colonic GI bleed.  So she ended up having a exploratory laparotomy which was negative for ischemic colitis.  She has finished vancomycin and Flagyl.  She is needing further work-up for possible adrenal insufficiency.  She tells me she plans on talking to her PCP next week about this.  She supposed to get a referral to endocrine.  She also needs to follow-up with her nephrologist.  She tells me she occasionally has some abdominal pain and cramping and likes the bentyl better than the Levsin.  She would like a refill of the Bentyl.  She tells me she still eating a bland diet.  She has not taken the Protonix that was given to her in the hospital she is back on the Pepcid.  She denies any dysphasia, reflux, abdominal pain, nausea vomiting, diarrhea, constipation, rectal bleeding or melena.  She reports her appetite is  improving but she has lost 15 pounds since May.  Past Medical History:   Diagnosis Date   • C. difficile colitis 2020   • DDD (degenerative disc disease), lumbar    • Depression    • GI (gastrointestinal bleed)    • Hyperlipidemia    • Hypertension    • Insomnia    • Metabolic syndrome    • Myocardial infarction (CMS/[facility]) 1240-6678   • Osteoarthritis        Past Surgical History:   Procedure Laterality Date   • CARPAL TUNNEL RELEASE Right    • COLONOSCOPY N/A 2019    Procedure: COLONOSCOPY INTO CECUM AND TI WITH COLD BX POLYPECTOMIES;  Surgeon: Robert Trent MD;  Location: [facility] ENDOSCOPY;  Service: Gastroenterology   • CORONARY ANGIOPLASTY WITH STENT PLACEMENT     • ENDOSCOPY N/A 2019    Procedure: ESOPHAGOGASTRODUODENOSCOPY WITH BX;  Surgeon: Robert Trent MD;  Location: [facility] ENDOSCOPY;  Service: Gastroenterology   • EXPLORATORY LAPAROTOMY N/A 2020   • JOINT REPLACEMENT      BILATERAL   • KNEE ARTHROSCOPY Right    • SPINE SURGERY      L4-L5   • TOTAL KNEE ARTHROPLASTY Left    • TUBAL ABDOMINAL LIGATION     • ULNAR NERVE DECOMPRESSION Left        Scheduled Meds:    Continuous Infusions:  No current facility-administered medications for this visit.     PRN Meds:.    Allergies   Allergen Reactions   • Metformin Other (See Comments)     Kidney failure     • Clindamycin/Lincomycin GI Intolerance       Social History     Socioeconomic History   • Marital status:      Spouse name: Not on file   • Number of children: 3   • Years of education: college   • Highest education level: Not on file   Occupational History   • Occupation: disables   Tobacco Use   • Smoking status: Former Smoker     Packs/day: 1.50     Years: 32.00     Pack years: 48.00     Types: Cigarettes     Start date:      Last attempt to quit: 2009     Years since quittin.6   • Smokeless tobacco: Never Used   Substance and Sexual Activity   • Alcohol use: No   • Drug use: No   • Sexual  activity: Never     Partners: Male   Social History Narrative    Lives at home with ex-       Family History   Problem Relation Age of Onset   • Hypertension Mother    • Diabetes Mother    • Lung cancer Father    • No Known Problems Sister    • No Known Problems Brother    • Breast cancer Paternal Aunt    • Esophageal cancer Paternal Aunt        Review of Systems   Constitutional: Positive for fatigue. Negative for appetite change, chills, diaphoresis, fever and unexpected weight change.   HENT: Negative for nosebleeds, postnasal drip, sore throat, trouble swallowing and voice change.    Respiratory: Negative for cough, choking, chest tightness, shortness of breath and wheezing.    Cardiovascular: Negative for chest pain, palpitations and leg swelling.   Gastrointestinal: Negative for abdominal distention, abdominal pain, anal bleeding, blood in stool, constipation, diarrhea, nausea, rectal pain and vomiting.   Endocrine: Negative for polydipsia, polyphagia and polyuria.   Musculoskeletal: Negative for gait problem.   Skin: Negative for rash and wound.   Allergic/Immunologic: Negative for food allergies.   Neurological: Negative for dizziness, speech difficulty and light-headedness.   Psychiatric/Behavioral: Negative for confusion, self-injury, sleep disturbance and suicidal ideas.       Vitals:    09/08/20 1344   BP: 110/70   Temp: 98 °F (36.7 °C)       Physical Exam   Constitutional: She is oriented to person, place, and time. She appears well-developed and well-nourished. She does not appear ill. No distress.   HENT:   Head: Normocephalic.   Eyes: Pupils are equal, round, and reactive to light.   Cardiovascular: Normal rate, regular rhythm and normal heart sounds.   Pulmonary/Chest: Effort normal and breath sounds normal.   Abdominal: Soft. Bowel sounds are normal. She exhibits no distension and no mass. There is no hepatosplenomegaly. There is no tenderness. There is no rebound and no guarding. No hernia.    Abdominal incision is healed well.  Staples are out.  Incision looks good.  No signs or symptoms of infection.  No drainage or bleeding noted.   Musculoskeletal: Normal range of motion.   Ambulates with a cane   Neurological: She is alert and oriented to person, place, and time.   Skin: Skin is warm and dry.   Psychiatric: She has a normal mood and affect. Her speech is normal and behavior is normal. Judgment normal.       No radiology results for the last 7 days     Assessment and plan     1. Septic shock due to Clostridium difficile (CMS/HCC)  - CBC & Differential  - Comprehensive Metabolic Panel    2. Gastrointestinal hemorrhage, unspecified gastrointestinal hemorrhage type  - CBC & Differential  - Comprehensive Metabolic Panel    3. S/P exploratory laparotomy  - CBC & Differential  - Comprehensive Metabolic Panel    4. Abnormal CT scan, colon    Review[facility].  I will repeat labs today.  Overall the patient does seem to be slowly improving.  She really needs to follow-up with renal and her PCP as planned.  She does need referral to endocrine for further work-up her of the low cortisol levels.  Continue current medications.  I will discuss the case further with Dr. Winston.  Patient to call the office with any issues.  Patient is agreeable to the plan.

## 2020-09-09 ENCOUNTER — TELEPHONE (OUTPATIENT)
Dept: GASTROENTEROLOGY | Facility: CLINIC | Age: 60
End: 2020-09-09

## 2020-09-09 LAB
ALBUMIN SERPL-MCNC: 4.1 G/DL (ref 3.5–5.2)
ALBUMIN/GLOB SERPL: 2 G/DL
ALP SERPL-CCNC: 70 U/L (ref 39–117)
ALT SERPL-CCNC: 15 U/L (ref 1–33)
AST SERPL-CCNC: 25 U/L (ref 1–32)
BASOPHILS # BLD AUTO: 0.16 10*3/MM3 (ref 0–0.2)
BASOPHILS NFR BLD AUTO: 1.9 % (ref 0–1.5)
BILIRUB SERPL-MCNC: 0.3 MG/DL (ref 0–1.2)
BUN SERPL-MCNC: 7 MG/DL (ref 6–20)
BUN/CREAT SERPL: 6.4 (ref 7–25)
CALCIUM SERPL-MCNC: 9.4 MG/DL (ref 8.6–10.5)
CHLORIDE SERPL-SCNC: 99 MMOL/L (ref 98–107)
CO2 SERPL-SCNC: 27.1 MMOL/L (ref 22–29)
CREAT SERPL-MCNC: 1.09 MG/DL (ref 0.57–1)
EOSINOPHIL # BLD AUTO: 0.45 10*3/MM3 (ref 0–0.4)
EOSINOPHIL NFR BLD AUTO: 5.3 % (ref 0.3–6.2)
ERYTHROCYTE [DISTWIDTH] IN BLOOD BY AUTOMATED COUNT: 13.4 % (ref 12.3–15.4)
GLOBULIN SER CALC-MCNC: 2.1 GM/DL
GLUCOSE SERPL-MCNC: 92 MG/DL (ref 65–99)
HCT VFR BLD AUTO: 36 % (ref 34–46.6)
HGB BLD-MCNC: 12.2 G/DL (ref 12–15.9)
IMM GRANULOCYTES # BLD AUTO: 0.04 10*3/MM3 (ref 0–0.05)
IMM GRANULOCYTES NFR BLD AUTO: 0.5 % (ref 0–0.5)
LYMPHOCYTES # BLD AUTO: 1.34 10*3/MM3 (ref 0.7–3.1)
LYMPHOCYTES NFR BLD AUTO: 15.8 % (ref 19.6–45.3)
MCH RBC QN AUTO: 29.8 PG (ref 26.6–33)
MCHC RBC AUTO-ENTMCNC: 33.9 G/DL (ref 31.5–35.7)
MCV RBC AUTO: 88 FL (ref 79–97)
MONOCYTES # BLD AUTO: 0.94 10*3/MM3 (ref 0.1–0.9)
MONOCYTES NFR BLD AUTO: 11.1 % (ref 5–12)
NEUTROPHILS # BLD AUTO: 5.53 10*3/MM3 (ref 1.7–7)
NEUTROPHILS NFR BLD AUTO: 65.4 % (ref 42.7–76)
NRBC BLD AUTO-RTO: 0 /100 WBC (ref 0–0.2)
PLATELET # BLD AUTO: 470 10*3/MM3 (ref 140–450)
POTASSIUM SERPL-SCNC: 3.1 MMOL/L (ref 3.5–5.2)
PROT SERPL-MCNC: 6.2 G/DL (ref 6–8.5)
RBC # BLD AUTO: 4.09 10*6/MM3 (ref 3.77–5.28)
SODIUM SERPL-SCNC: 138 MMOL/L (ref 136–145)
WBC # BLD AUTO: 8.46 10*3/MM3 (ref 3.4–10.8)

## 2020-09-09 NOTE — TELEPHONE ENCOUNTER
----- Message from LAVONNE Vargas sent at 9/9/2020  9:19 AM EDT -----  Please call the patient and let her know her labs look much better.  White count is normal.  Hemoglobin is back up at 12.2.  Potassium is slightly low at 3.1.  She needs to make sure she is eating a lot of bananas or green leafy vegetables.  How is she doing?

## 2020-09-09 NOTE — TELEPHONE ENCOUNTER
Call to pt.  Advise per M Nilsa note.      Verb understanding.  States doing ok.  Update to M Nilsa.

## 2020-09-10 ENCOUNTER — TELEPHONE (OUTPATIENT)
Dept: GASTROENTEROLOGY | Facility: CLINIC | Age: 60
End: 2020-09-10

## 2020-09-10 NOTE — TELEPHONE ENCOUNTER
----- Message from LAVONNE Vargas sent at 9/10/2020  4:24 PM EDT -----  Please call patient with recs below from Dr. Winston. Thanks.   Nel   ----- Message -----  From: Giovana Winston MD  Sent: 9/10/2020   3:47 PM EDT  To: LAVONNE Vargas    She has had a fairly recent colonoscopy - I do not think repeat is warranted given her clinical course and recent issues.  Agree with pepcid only (no ppi) due to h/o c diff.    ----- Message -----  From: Nel Ash APRN  Sent: 9/8/2020   2:35 PM EDT  To: Giovana Winston MD    Seen for hospital f/u at [facility] for septic shock, GI bleed, C-diff colitis, abnormal CT of the colon, low cortisol level, acute kidney injury and s/p exploratory lap. She is doing better. Finished Vanc and flagyl. No more diarrhea. Will check labs today. Has appt with PCP and renal. Needs referral to Endocrine for low cortisol. Does she need colonoscopy at some point? Your thoughts. She still cant believe how sick she was. She had antibiotics from dentist previously. Thanks.

## 2020-09-14 ENCOUNTER — TELEPHONE (OUTPATIENT)
Dept: GASTROENTEROLOGY | Facility: CLINIC | Age: 60
End: 2020-09-14

## 2020-09-14 RX ORDER — FAMOTIDINE 20 MG/1
20 TABLET, FILM COATED ORAL 2 TIMES DAILY
Qty: 180 TABLET | Refills: 2 | Status: SHIPPED | OUTPATIENT
Start: 2020-09-14 | End: 2020-10-08 | Stop reason: SDUPTHER

## 2020-09-14 NOTE — TELEPHONE ENCOUNTER
Called pt and advised that we have refilled her pepcid 20mg po bid to her [facility] pharmacy.  Pt verb understanding.

## 2020-09-14 NOTE — TELEPHONE ENCOUNTER
----- Message from Sandra Kellogg sent at 9/14/2020 11:34 AM EDT -----  Regarding: Prescription Question  Contact: [phone]  Cooper Neumann, I am running low on my Famotide 20mg. Bid. Can you send in for it at [facility] pharmacy at [address] [place] [address]. 90 day supplies please.

## 2020-09-15 ENCOUNTER — OFFICE VISIT (OUTPATIENT)
Dept: FAMILY MEDICINE CLINIC | Facility: CLINIC | Age: 60
End: 2020-09-15

## 2020-09-15 VITALS
RESPIRATION RATE: 18 BRPM | OXYGEN SATURATION: 97 % | HEIGHT: 65 IN | HEART RATE: 87 BPM | SYSTOLIC BLOOD PRESSURE: 104 MMHG | DIASTOLIC BLOOD PRESSURE: 70 MMHG | BODY MASS INDEX: 37.82 KG/M2 | WEIGHT: 227 LBS

## 2020-09-15 DIAGNOSIS — Z87.891 STOPPED SMOKING WITH GREATER THAN 40 PACK YEAR HISTORY: ICD-10-CM

## 2020-09-15 DIAGNOSIS — N18.9 CHRONIC KIDNEY DISEASE, UNSPECIFIED CKD STAGE: ICD-10-CM

## 2020-09-15 DIAGNOSIS — E78.2 MIXED HYPERLIPIDEMIA: ICD-10-CM

## 2020-09-15 DIAGNOSIS — Z23 NEED FOR VACCINATION: ICD-10-CM

## 2020-09-15 DIAGNOSIS — E27.49 HYPOCORTISOLEMIA (HCC): Primary | ICD-10-CM

## 2020-09-15 DIAGNOSIS — E03.9 ACQUIRED HYPOTHYROIDISM: ICD-10-CM

## 2020-09-15 DIAGNOSIS — Z12.31 ENCOUNTER FOR SCREENING MAMMOGRAM FOR MALIGNANT NEOPLASM OF BREAST: ICD-10-CM

## 2020-09-15 DIAGNOSIS — I71.40 ABDOMINAL AORTIC ANEURYSM (AAA) WITHOUT RUPTURE (HCC): ICD-10-CM

## 2020-09-15 PROBLEM — A41.9 SEVERE SEPSIS WITH ACUTE ORGAN DYSFUNCTION: Status: ACTIVE | Noted: 2020-08-15

## 2020-09-15 PROBLEM — R65.20 SEVERE SEPSIS WITH ACUTE ORGAN DYSFUNCTION (HCC): Status: ACTIVE | Noted: 2020-08-15

## 2020-09-15 PROBLEM — E78.5 HYPERLIPIDEMIA: Status: RESOLVED | Noted: 2017-07-20 | Resolved: 2020-09-15

## 2020-09-15 PROCEDURE — 90471 IMMUNIZATION ADMIN: CPT | Performed by: FAMILY MEDICINE

## 2020-09-15 PROCEDURE — 99214 OFFICE O/P EST MOD 30 MIN: CPT | Performed by: FAMILY MEDICINE

## 2020-09-15 PROCEDURE — 90686 IIV4 VACC NO PRSV 0.5 ML IM: CPT | Performed by: FAMILY MEDICINE

## 2020-09-15 NOTE — PATIENT INSTRUCTIONS
I have referred you to endocrinology to see if we can sort this out and I will see you back in 2 months for an annual physical.

## 2020-09-15 NOTE — PROGRESS NOTES
Subjective   Sandra Kellogg is a 60 y.o. female.   endocrine disorder and Abdominal Pain      History of Present Illness   Nanda is here today for follow-up after a difficult course with an episode of sepsis.  She was admitted to [facility] on August 14, 2020 and was discharged August 25, 2020.  She was admitted to the hospital with abdominal pain and bloody stools that got voluminous enough that she decided to go to the emergency room.  When she was in the ER she was found to have evidence suggestive of sepsis and acute organ failure including renal failure.  She was tachycardic and hypotensive she was admitted to transitional care unit and followed by gastroenterology and ended up in ICU because of pressure drop and worsening abdominal pain.  She was sent to the ER for an exploratory laparotomy and was just to have C. difficile after this laparotomy.  A colonoscopy was performed as well.  He was treated with vancomycin and IV fluids and improved.  While in the hospital she was noted to have an acute kidney injury and she had a pretty significant drop in her cortisol levels.  That combined with her concomitant hypothyroidism, she was advised to seek an endocrinology evaluation.  She is healing well and is feeling much better.  Of note her cortisol levels had returned to normal before she left the hospital.  She is also tripped yesterday at home and hit her head on a door jam and fell on both knees.  Also complains of back pain and bilateral arm pain from the fall.  She is walking, she feels like she is doing better.    The following portions of the patient's history were reviewed and updated as appropriate: allergies, current medications, past family history, past medical history, past social history, past surgical history and problem list.    Review of Systems   Constitutional: Negative.    HENT: Negative.    Eyes: Negative.    Respiratory: Negative.    Cardiovascular: Negative.    Genitourinary:  Negative.    Musculoskeletal: Positive for arthralgias and back pain.   Skin: Negative.    Neurological: Negative.    Psychiatric/Behavioral: Negative.        Objective   Physical Exam  Vitals signs and nursing note reviewed.   Constitutional:       Appearance: She is well-developed.      Comments: Ambulating with a cane.   HENT:      Head: Normocephalic and atraumatic.   Eyes:      Pupils: Pupils are equal, round, and reactive to light.   Cardiovascular:      Rate and Rhythm: Normal rate and regular rhythm.      Heart sounds: Normal heart sounds.   Pulmonary:      Effort: Pulmonary effort is normal.      Breath sounds: Normal breath sounds.   Abdominal:      Comments: Well healing midline abdominal scar.   Skin:     General: Skin is warm and dry.   Neurological:      Mental Status: She is alert and oriented to person, place, and time.   Psychiatric:         Mood and Affect: Mood normal.         Behavior: Behavior normal.         Thought Content: Thought content normal.         Judgment: Judgment normal.         `  Assessment/Plan   Problem List Items Addressed This Visit        Cardiovascular and Mediastinum    Hyperlipidemia    Relevant Orders    Ambulatory Referral to Endocrinology (Completed)    Abdominal aortic aneurysm (AAA) without rupture (CMS/HCC)    Relevant Orders    US Abdomen Complete       Endocrine    Acquired hypothyroidism    Relevant Orders    Ambulatory Referral to Endocrinology (Completed)       Genitourinary    CKD (chronic kidney disease)       Other    Hypocortisolemia (CMS/HCC) - Primary    Relevant Orders    Ambulatory Referral to Endocrinology (Completed)      Other Visit Diagnoses     Need for vaccination        Relevant Orders    Fluarix Quad >6 Months (VFC) ([id number]) (Completed)    Stopped smoking with greater than 40 pack year history        Relevant Orders    CT Chest Low Dose Wo    Encounter for screening mammogram for malignant neoplasm of breast        Relevant Orders    Mammo  Screening Bilateral With CAD        I have referred Sandra to endocrinology to see if there is some common thread to the problems that she has been having.  She will come back for labs and a Medicare annual wellness visit in 2 months.       Return in about 2 months (around 11/15/2020) for Annual physical.

## 2020-09-29 ENCOUNTER — TELEPHONE (OUTPATIENT)
Dept: GASTROENTEROLOGY | Facility: CLINIC | Age: 60
End: 2020-09-29

## 2020-09-29 NOTE — TELEPHONE ENCOUNTER
----- Message from Sandra Bess sent at 9/29/2020  3:39 PM EDT -----  Regarding: Non-Urgent Medical Question  Contact: [phone]  I had and appointment with a doctor at the [facility].  He wants me to have a manometry anorectal test. I have an order from him. Do you know where I can schedule this test.

## 2020-09-30 NOTE — TELEPHONE ENCOUNTER
Yes we will need the actual [facility] order to know kind of how to proceed.  we need to make sure we do it exactly the way they want it done.  Thanks

## 2020-10-05 ENCOUNTER — TELEPHONE (OUTPATIENT)
Dept: GASTROENTEROLOGY | Facility: CLINIC | Age: 60
End: 2020-10-05

## 2020-10-05 NOTE — TELEPHONE ENCOUNTER
Dr. Winston [place] has sent her with an order for an anal rectal manometry to be done.  Are you okay with us ordering that to be done here?

## 2020-10-05 NOTE — TELEPHONE ENCOUNTER
----- Message from Sandra Kelolgg sent at 10/5/2020 11:05 AM EDT -----  Regarding: Referral Request  Contact: [phone]  This is the order from the [facility].

## 2020-10-08 NOTE — TELEPHONE ENCOUNTER
----- Message from Sandra Kellogg sent at 10/8/2020  3:00 PM EDT -----  Regarding: Prescription Question  Contact: [phone]  I'm sorry to bother you but I need famotidine script sent to [facility]or [facility].     Thanks

## 2020-10-09 ENCOUNTER — OFFICE VISIT (OUTPATIENT)
Dept: FAMILY MEDICINE CLINIC | Facility: CLINIC | Age: 60
End: 2020-10-09

## 2020-10-09 VITALS
RESPIRATION RATE: 16 BRPM | WEIGHT: 222 LBS | HEIGHT: 65 IN | TEMPERATURE: 98.3 F | OXYGEN SATURATION: 98 % | BODY MASS INDEX: 36.99 KG/M2 | SYSTOLIC BLOOD PRESSURE: 128 MMHG | HEART RATE: 78 BPM | DIASTOLIC BLOOD PRESSURE: 70 MMHG

## 2020-10-09 DIAGNOSIS — Z12.4 SCREENING FOR MALIGNANT NEOPLASM OF THE CERVIX: ICD-10-CM

## 2020-10-09 DIAGNOSIS — F34.1 DYSTHYMIA: ICD-10-CM

## 2020-10-09 DIAGNOSIS — Z11.3 SCREENING EXAMINATION FOR SEXUALLY TRANSMITTED DISEASE: ICD-10-CM

## 2020-10-09 DIAGNOSIS — Z91.89 ENCOUNTER FOR GYNECOLOGIC EXAMINATION FOR HIGH-RISK PATIENT COVERED BY MEDICARE: ICD-10-CM

## 2020-10-09 DIAGNOSIS — I10 ESSENTIAL HYPERTENSION: ICD-10-CM

## 2020-10-09 DIAGNOSIS — Z00.00 MEDICARE ANNUAL WELLNESS VISIT, SUBSEQUENT: Primary | ICD-10-CM

## 2020-10-09 DIAGNOSIS — E78.2 MIXED HYPERLIPIDEMIA: ICD-10-CM

## 2020-10-09 DIAGNOSIS — E86.0 DEHYDRATION: ICD-10-CM

## 2020-10-09 DIAGNOSIS — N39.41 URGE INCONTINENCE OF URINE: ICD-10-CM

## 2020-10-09 DIAGNOSIS — Z01.419 ROUTINE GYNECOLOGICAL EXAMINATION: ICD-10-CM

## 2020-10-09 DIAGNOSIS — E03.9 ACQUIRED HYPOTHYROIDISM: ICD-10-CM

## 2020-10-09 PROCEDURE — 96160 PT-FOCUSED HLTH RISK ASSMT: CPT | Performed by: FAMILY MEDICINE

## 2020-10-09 PROCEDURE — G0439 PPPS, SUBSEQ VISIT: HCPCS | Performed by: FAMILY MEDICINE

## 2020-10-09 PROCEDURE — 99214 OFFICE O/P EST MOD 30 MIN: CPT | Performed by: FAMILY MEDICINE

## 2020-10-09 RX ORDER — SUCRALFATE 1 G/1
1 TABLET ORAL
COMMUNITY
Start: 2020-05-13 | End: 2021-03-04

## 2020-10-09 RX ORDER — PANTOPRAZOLE SODIUM 40 MG/1
40 TABLET, DELAYED RELEASE ORAL DAILY
COMMUNITY
Start: 2020-08-25 | End: 2020-11-30

## 2020-10-09 RX ORDER — ZOLPIDEM TARTRATE 10 MG/1
10 TABLET ORAL
COMMUNITY
End: 2022-06-07 | Stop reason: HOSPADM

## 2020-10-09 RX ORDER — OXYCODONE AND ACETAMINOPHEN 10; 325 MG/1; MG/1
TABLET ORAL
COMMUNITY
Start: 2020-10-08 | End: 2022-06-07 | Stop reason: HOSPADM

## 2020-10-09 RX ORDER — NITROGLYCERIN 0.4 MG/1
0.4 TABLET SUBLINGUAL
COMMUNITY
Start: 2020-05-05

## 2020-10-09 RX ORDER — FAMOTIDINE 20 MG/1
20 TABLET, FILM COATED ORAL 2 TIMES DAILY
Qty: 180 TABLET | Refills: 2 | Status: SHIPPED | OUTPATIENT
Start: 2020-10-09

## 2020-10-09 RX ORDER — DULOXETIN HYDROCHLORIDE 60 MG/1
1 CAPSULE, DELAYED RELEASE ORAL DAILY
COMMUNITY
Start: 2020-09-12 | End: 2020-11-25

## 2020-10-09 NOTE — PATIENT INSTRUCTIONS
Medicare Wellness  Personal Prevention Plan of Service   I will call you with lab results.  Date of Office Visit:  10/09/2020  Encounter Provider:  Jh Muniz MD  Place of Service:  [facility]  Patient Name: Sandra Kellogg  :  1960    As part of the Medicare Wellness portion of your visit today, we are providing you with this personalized preventive plan of services (PPPS). This plan is based upon recommendations of the [place] Preventive Services Task Force (USPSTF) and the Advisory Committee on Immunization Practices (ACIP).    This lists the preventive care services that should be considered, and provides dates of when you are due. Items listed as completed are up-to-date and do not require any further intervention.    Health Maintenance   Topic Date Due   • ZOSTER VACCINE (1 of 2) 2011   • LIPID PANEL  2021   • LUNG CANCER SCREENING  2021   • MAMMOGRAM  2021   • ANNUAL WELLNESS VISIT  10/09/2021   • TDAP/TD VACCINES (2 - Td) 2022   • PAP SMEAR  2023   • COLONOSCOPY  2029   • HEPATITIS C SCREENING  Completed   • INFLUENZA VACCINE  Completed   • Pneumococcal Vaccine 0-64  Aged Out       Orders Placed This Encounter   Procedures   • Comprehensive Metabolic Panel   • CBC (No Diff)   • Lipid Panel With / Chol / HDL Ratio   • RPR, Rfx Qn RPR / Confirm TP       Return in about 6 months (around 2021).

## 2020-10-09 NOTE — PROGRESS NOTES
Medicare Subsequent Well Visit    WELL WOMAN EXAM    Sandra Kellogg is a 60 y.o. female who presents today for a well woman exam and annual Medicare Wellness. See Attached not for Medicare Well Exam/    The following portions of the patient's history were reviewed and updated as appropriate: allergies, current medications, past family history, past medical history, past social history, past surgical history and problem list.    History of Present Illness    GYN HISTORY:  GP:   Family History:    Breast Disease: none   Gyn Disease: unknown  Menses:    LMP: at 57    Pap Hx:    Hx of abnl PAP Smears:  no   Last Pap: several years ago  Birth Control method: menopusal  Sexual History:   Not sexually active, monogamous, in heterosexual relationship.    Number of past sexual partners: >5     Infectiion   No hx of sexually transmitted diseases.     Normal vaginal discharge, no hx of vaginal infections.   HIV: negative in the past  Breast:    Last mammogram: 2 years ago, ordered for this year   Breast pain, nipple discharge, or masses:  n    Review of Systems   Constitutional: Negative.    HENT: Negative.    Eyes: Negative.    Respiratory: Negative.    Cardiovascular: Negative.    Gastrointestinal: Positive for abdominal pain.   Endocrine: Negative.    Genitourinary: Negative.    Musculoskeletal: Negative.    Skin: Negative.    Allergic/Immunologic: Negative.    Neurological: Negative.    Hematological: Negative.    Psychiatric/Behavioral: Negative.         I have reviewed the ROS as documented by the MA. Jh Muniz MD         Physical Exam    Gen:  AFVSS, pleasant, cooperative  NECK: No thyromegaly, no LAD.    CV: RRR, no murmurs, rubs, clicks or gallops.    LUNGS: CTA bilaterally.    BREAST: No masses, no dimpling skin, no d/c. No asymmetry noted, no skin changes. No nipple inversion,no retraction, nl fibrocystic changes.  ABD: Soft, non-tender, non-distended. Bowel sounds present. No masses. No  HSM.  :   External genitalia: normal  Urethra exam:  nl, no masses  Vaginal mucosa & discharge:  normal, no blood in vault  Cervix: no  inflammation or motion tenderness, no cystocele or rectocele.  Bimanual exam: nl uterine size, no tenderness, no ovarian masses palpated.    Assessment/Plan   Problem List Items Addressed This Visit     None        1. Well woman exam today. Pap smear results are pending, HPV test is pending. She will be notified of results and patient reminded that it may take up to a week for lab testing to be completed.  Pelvic exam is normal.   2. Breast exam is normal.Mammogram ordered.   3. Cholesterol, complete metabolic panel  and complete blood count ordered today for [facility].  Immunizations reviewed and on chart.  Colon cancer screening [place].  Lung cancer screening:ordered  4. Patient has requested testing for gonorrhea and chlamydia  5. Patient has   requested testing for HIV/syphillis

## 2020-10-09 NOTE — PROGRESS NOTES
Medicare Subsequent Wellness Visit  Subjective   History of Present Illness    Sandra Kellogg is a 60 y.o. female who presents for an Medicare Wellness Visit. In addition, we addressed the following health issues:  She is stills struggling with abdominal pain after a hospitalization that began August 14 of this year and she was discharged August 25.  At this visit she had an exploratory laparotomy and was found to have C. difficile she had a colonoscopy done as well and was treated with vancomycin and IV fluids and improved.  She is now being followed by an endocrinologist because of hypothyroidism and a drop in her cortisol levels.  Sandra has a history of chronic hypertension and has been well controlled on current medications.She is tolerating medications without side effect. She reports no vision changes, headaches or lightheadedness. She is requesting refills of medications.  Sandra has a history of chronic hypothyroidism and has been well controlled on current dose of replacement.She report no concerning symptoms: no cold intolerance, fatigue or hair loss.   She has a history of chronic hyperlipidemia and needs lab work today to evaluate response to therapy. She is tolerating medications well without side effects.  Sandra has a hx of depression and states that she had not been getting  good relief from symptoms on current medication, Celexa. This is a chronic problem.  She had no intolerable side effects to medication. She has a lot of pain and has not tried Cymbalta. No thoughts of self harm expressed.  She has concerns about urinary incontinence. She states that this started last November after her knee surgery and has continued, gotten worse so she is wearing protective underwear. She is now on medication to treat.    PMH, PSH, SocHx, FamHx, Allergies, and Medications: Reviewed and updated in the history section of chart.  Family History   Problem Relation Age of Onset   • Hypertension Mother    • Diabetes Mother     • Lung cancer Father    • No Known Problems Sister    • No Known Problems Brother    • Breast cancer Paternal Aunt    • Esophageal cancer Paternal Aunt        Social History     Social History Narrative    Lives at home with ex-       Allergies   Allergen Reactions   • Metformin Other (See Comments)     Kidney failure     • Clindamycin/Lincomycin GI Intolerance       Outpatient Medications Prior to Visit   Medication Sig Dispense Refill   • DULoxetine (CYMBALTA) 60 MG capsule Take 1 capsule by mouth Daily.     • nitroglycerin (NITROSTAT) 0.4 MG SL tablet Place 0.4 mg under the tongue.     • oxyCODONE-acetaminophen (PERCOCET)  MG per tablet Take 1 tablet by mouth every 4 to 6 hours if needed (max 5/day)  for 30 Days     • pantoprazole (PROTONIX) 40 MG EC tablet Take 40 mg by mouth Daily.     • sucralfate (CARAFATE) 1 g tablet Take 1 g by mouth.     • amLODIPine (NORVASC) 10 MG tablet TAKE 1 TABLET EVERY DAY 90 tablet 1   • aspirin 81 MG tablet Take 81 mg by mouth Daily.     • Cholecalciferol (VITAMIN D3) 5000 UNITS chewable tablet Chew 1 tablet Daily.     • dicyclomine (BENTYL) 20 MG tablet Take 1 tablet by mouth 4 (Four) Times a Day As Needed (abd pain or cramping). 90 tablet 3   • famotidine (PEPCID) 20 MG tablet Take 1 tablet by mouth 2 (Two) Times a Day. 180 tablet 2   • hydrALAZINE (APRESOLINE) 25 MG tablet Take 25 mg by mouth 2 (two) times a day.     • isosorbide mononitrate (IMDUR) 60 MG 24 hr tablet Take 1 tablet by mouth Daily. 90 tablet 3   • lamoTRIgine (LaMICtal) 25 MG tablet 2 tabs po qhs 180 tablet 1   • levothyroxine (SYNTHROID, LEVOTHROID) 125 MCG tablet TAKE 1 TABLET EVERY DAY 90 tablet 1   • lisinopril (PRINIVIL,ZESTRIL) 40 MG tablet TAKE 1 TABLET EVERY DAY 90 tablet 1   • metoprolol succinate XL (TOPROL-XL) 50 MG 24 hr tablet TAKE 1 TABLET EVERY DAY 90 tablet 1   • Omega-3 Fatty Acids (OMEGA-3 FISH OIL PO) Take 1 tablet by mouth Daily.     • oxybutynin (DITROPAN) 5 MG tablet Take 1  tablet by mouth 2 (Two) Times a Day. 180 tablet 3   • promethazine (PHENERGAN) 25 MG tablet Take 1 tablet by mouth Every 6 (Six) Hours As Needed for Nausea or Vomiting. 30 tablet 2   • rosuvastatin (CRESTOR) 40 MG tablet Take 40 mg by mouth Daily.     • tiZANidine (ZANAFLEX) 4 MG tablet Take 4 mg by mouth 3 (Three) Times a Day.     • zolpidem (AMBIEN) 10 MG tablet Take 10 mg by mouth.     • DULoxetine (CYMBALTA) 60 MG capsule TAKE 1 CAPSULE EVERY DAY 90 capsule 1   • nitroglycerin (NITROSTAT) 0.4 MG SL tablet Place 0.4 mg under the tongue As Needed.     • oxyCODONE-acetaminophen (PERCOCET)  MG per tablet 1 po q4h prn     • sucralfate (CARAFATE) 1 g tablet Take 1 tablet by mouth 3 (Three) Times a Day With Meals. 270 tablet 3   • zolpidem (AMBIEN) 10 MG tablet Take 10 mg by mouth Daily.       No facility-administered medications prior to visit.         Patient Active Problem List   Diagnosis   • Impingement syndrome of right shoulder   • Osteoarthritis of right hip   • Osteoarthritis of right knee   • Primary osteoarthritis of left hip   • Renal insufficiency   • Trochanteric bursitis of left hip   • Trochanteric bursitis of right hip   • Essential hypertension   • Hyperlipidemia   • Acquired hypothyroidism   • Myocardial infarction (CMS/HCC)   • Arthritis of left knee   • Status post total left knee replacement   • Depression   • Urge incontinence of urine   • Acute kidney insufficiency   • Dehydration   • Nausea vomiting and diarrhea   • Chronic pain   • HTN (hypertension)   • CAD (coronary artery disease)   • Polypharmacy   • Duodenitis   • Adenomatous colon polyp   • Acute renal insufficiency   • CKD (chronic kidney disease)   • Hyperkalemia   • Radial head fracture   • Hypocortisolemia (CMS/HCC)   • Severe sepsis with acute organ dysfunction (CMS/HCC)   • Abdominal aortic aneurysm (AAA) without rupture (CMS/HCC)         Patient Care Team:  Jh Muniz MD as PCP - [facility] (Family Medicine)  [facility]  Habits:  Current Diet: Well balanced diet  Tobacco use.  Pack years: 32 pack years, she gets annual low-dose CT lung screening  Dental Exam.  Up-to-date  Eye Exam.  Advised  Exercise: Not possible due to chronic back and knee pain    Recent Hospitalizations:  none    Age-appropriate Screening Schedule:  Refer to the list below for future screening recommendations based on patient's age. Orders for these recommended tests are listed in the plan section. The patient has been provided with a written plan.      Health Maintenance   Topic Date Due   • ZOSTER VACCINE (1 of 2) 06/24/2011   • LIPID PANEL  01/17/2021   • LUNG CANCER SCREENING  08/14/2021   • MAMMOGRAM  08/29/2021   • ANNUAL WELLNESS VISIT  10/09/2021   • TDAP/TD VACCINES (2 - Td) 05/31/2022   • PAP SMEAR  05/03/2023   • COLONOSCOPY  01/27/2029   • HEPATITIS C SCREENING  Completed   • INFLUENZA VACCINE  Completed   • Pneumococcal Vaccine 0-64  Aged Out       Depression Screen:   PHQ-2/PHQ-9 Depression Screening 10/9/2020   Little interest or pleasure in doing things 1   Feeling down, depressed, or hopeless 1   Trouble falling or staying asleep, or sleeping too much (No Data)   Feeling tired or having little energy 0   Poor appetite or overeating 0   Feeling bad about yourself - or that you are a failure or have let yourself or your family down 0   Trouble concentrating on things, such as reading the newspaper or watching television 1   Moving or speaking so slowly that other people could have noticed. Or the opposite - being so fidgety or restless that you have been moving around a lot more than usual 0   Thoughts that you would be better off dead, or of hurting yourself in some way 0   Total Score 3   If you checked off any problems, how difficult have these problems made it for you to do your work, take care of things at home, or get along with other people? -       Functional and Cognitive Screening:  Functional & Cognitive Status 10/9/2020   Do you have  "difficulty preparing food and eating? Yes   Do you have difficulty bathing yourself, getting dressed or grooming yourself? No   Do you have difficulty using the toilet? No   Do you have difficulty moving around from place to place? Yes   Do you have trouble with steps or getting out of a bed or a chair? Yes   Current Diet Well Balanced Diet   Dental Exam Up to date   Eye Exam Up to date   Exercise (times per week) 0 times per week   Current Exercise Activities Include None   Do you need help using the phone?  No   Are you deaf or do you have serious difficulty hearing?  No   Do you need help with transportation? No   Do you need help shopping? Yes   Do you need help preparing meals?  Yes   Do you need help with laundry? Yes   Do you need help taking your medications? No   Do you need help managing money? No   Do you ever drive or ride in a car without wearing a seat belt? No   Have you felt unusual stress, anger or loneliness in the last month? No   Who do you live with? Spouse   If you need help, do you have trouble finding someone available to you? No   Have you been bothered in the last four weeks by sexual problems? No   Do you have difficulty concentrating, remembering or making decisions? Yes     Does the patient have evidence of cognitive impairment?  None    Compared to one year ago, the patient feels their physical health and mental health are somewhat worse.       Review of Systems  See attached GYN note  Objective     Vitals:    10/09/20 1115   BP: 128/70   Pulse: 78   Resp: 16   Temp: 98.3 °F (36.8 °C)   SpO2: 98%   Weight: 101 kg (222 lb)   Height: 165.1 cm (65\")       Body mass index is 36.94 kg/m².    PHYSICAL EXAM  Vitals reviewed and on chart.  HEENT: PERRLA, EOMI. Oral mucosa moist,   No LAD.  CV: RRR, no murmurs, rubs, clicks or gallops  LUNGS: CTA bilaterally  EXT: No edema, FROM in bilateral upper and lower ext  NEURO: CN II - XII grossly intact  PSYCH: good mood, positive affect, alert and " engaged. No thoughts of self harm expressed.    ASSESSMENT AND PLAN  Pap smear : Ordered  Mammogram: Ordered  Colon cancer screening : Up-to-date  Lung cancer screening : Ordered        Problem List Items Addressed This Visit        Cardiovascular and Mediastinum    Hyperlipidemia  Labs ordered today to evaluate    HTN (hypertension)  Well controlled on current medication, will refill medication today and as needed. She will RTO for repeat B/P check in 6 months.       Endocrine    Acquired hypothyroidism  Now followed by endocrinology       Genitourinary    Urge incontinence of urine  Doing well on oxybutynin       Other    Dysthymia  Doing better on current medication.  Tolerating medications well.      Other Visit Diagnoses     Medicare annual wellness visit, subsequent    -  Primary    Relevant Orders    Comprehensive Metabolic Panel    CBC (No Diff)    Lipid Panel With / Chol / HDL Ratio    PapIG, CtNg, HPV, Rfx 16 / 18    Screening examination for sexually transmitted disease        Relevant Orders    RPR, Rfx Qn RPR / Confirm TP    Routine gynecological examination        Relevant Orders    PapIG, CtNg, HPV, Rfx 16 / 18    Screening for malignant neoplasm of the cervix        Relevant Orders    PapIG, CtNg, HPV, Rfx 16 / 18    Encounter for gynecologic examination for high-risk patient covered by Medicare        Relevant Orders    PapIG, CtNg, HPV, Rfx 16 / 18          Orders:  Orders Placed This Encounter   Procedures   • Comprehensive Metabolic Panel   • CBC (No Diff)   • Lipid Panel With / Chol / HDL Ratio   • RPR, Rfx Qn RPR / Confirm TP       Follow Up:  Return in about 6 months (around 4/9/2021).     ADVANCED DIRECTIVES:   ACP discussion was held with the patient during this visit. Patient does not have an advance directive, information provided.    An After Visit Summary and PPPS with all of these plans were given to the patient.

## 2020-10-13 LAB
ALBUMIN SERPL-MCNC: 4.4 G/DL (ref 3.8–4.9)
ALBUMIN/GLOB SERPL: 1.6 {RATIO} (ref 1.2–2.2)
ALP SERPL-CCNC: 73 IU/L (ref 39–117)
ALT SERPL-CCNC: 11 IU/L (ref 0–32)
AST SERPL-CCNC: 16 IU/L (ref 0–40)
BILIRUB SERPL-MCNC: 0.3 MG/DL (ref 0–1.2)
BUN SERPL-MCNC: 11 MG/DL (ref 6–24)
BUN/CREAT SERPL: 8 (ref 9–23)
CALCIUM SERPL-MCNC: 9.9 MG/DL (ref 8.7–10.2)
CHLORIDE SERPL-SCNC: 98 MMOL/L (ref 96–106)
CHOLEST SERPL-MCNC: 171 MG/DL (ref 100–199)
CHOLEST/HDLC SERPL: 4.6 RATIO (ref 0–4.4)
CO2 SERPL-SCNC: 26 MMOL/L (ref 20–29)
CREAT SERPL-MCNC: 1.31 MG/DL (ref 0.57–1)
ERYTHROCYTE [DISTWIDTH] IN BLOOD BY AUTOMATED COUNT: 14.3 % (ref 11.7–15.4)
GLOBULIN SER CALC-MCNC: 2.8 G/DL (ref 1.5–4.5)
GLUCOSE SERPL-MCNC: 106 MG/DL (ref 65–99)
HCT VFR BLD AUTO: 36.4 % (ref 34–46.6)
HDLC SERPL-MCNC: 37 MG/DL
HGB BLD-MCNC: 12 G/DL (ref 11.1–15.9)
LDLC SERPL CALC-MCNC: 98 MG/DL (ref 0–99)
MCH RBC QN AUTO: 29.9 PG (ref 26.6–33)
MCHC RBC AUTO-ENTMCNC: 33 G/DL (ref 31.5–35.7)
MCV RBC AUTO: 91 FL (ref 79–97)
PLATELET # BLD AUTO: 390 X10E3/UL (ref 150–450)
POTASSIUM SERPL-SCNC: 3.2 MMOL/L (ref 3.5–5.2)
PROT SERPL-MCNC: 7.2 G/DL (ref 6–8.5)
RBC # BLD AUTO: 4.02 X10E6/UL (ref 3.77–5.28)
RPR SER QL: NON REACTIVE
SODIUM SERPL-SCNC: 140 MMOL/L (ref 134–144)
TRIGL SERPL-MCNC: 208 MG/DL (ref 0–149)
VLDLC SERPL CALC-MCNC: 36 MG/DL (ref 5–40)
WBC # BLD AUTO: 6.7 X10E3/UL (ref 3.4–10.8)

## 2020-10-14 LAB
C TRACH RRNA CVX QL NAA+PROBE: NEGATIVE
CYTOLOGIST CVX/VAG CYTO: NORMAL
CYTOLOGY CVX/VAG DOC CYTO: NORMAL
CYTOLOGY CVX/VAG DOC THIN PREP: NORMAL
DX ICD CODE: NORMAL
HIV 1 & 2 AB SER-IMP: NORMAL
HPV I/H RISK 1 DNA CVX QL PROBE+SIG AMP: NEGATIVE
N GONORRHOEA RRNA CVX QL NAA+PROBE: NEGATIVE
OTHER STN SPEC: NORMAL
STAT OF ADQ CVX/VAG CYTO-IMP: NORMAL

## 2020-11-02 ENCOUNTER — HOSPITAL ENCOUNTER (OUTPATIENT)
Dept: CT IMAGING | Facility: HOSPITAL | Age: 60
Discharge: HOME | End: 2020-11-02

## 2020-11-02 ENCOUNTER — HOSPITAL ENCOUNTER (OUTPATIENT)
Dept: ULTRASOUND IMAGING | Facility: HOSPITAL | Age: 60
Discharge: HOME | End: 2020-11-02

## 2020-11-02 DIAGNOSIS — I71.40 ABDOMINAL AORTIC ANEURYSM (AAA) WITHOUT RUPTURE (HCC): ICD-10-CM

## 2020-11-02 DIAGNOSIS — Z87.891 STOPPED SMOKING WITH GREATER THAN 40 PACK YEAR HISTORY: ICD-10-CM

## 2020-11-02 PROCEDURE — 76775 US EXAM ABDO BACK WALL LIM: CPT

## 2020-11-02 PROCEDURE — G0297 LDCT FOR LUNG CA SCREEN: HCPCS

## 2020-11-24 RX ORDER — LIDOCAINE HYDROCHLORIDE 10 MG/ML
2 INJECTION, SOLUTION EPIDURAL; INFILTRATION; INTRACAUDAL; PERINEURAL
COMMUNITY
Start: 2020-11-24 | End: 2021-10-12

## 2020-11-24 RX ORDER — TRIAMCINOLONE ACETONIDE 40 MG/ML
40 INJECTION, SUSPENSION INTRA-ARTICULAR; INTRAMUSCULAR
COMMUNITY
Start: 2020-11-24 | End: 2020-11-30

## 2020-11-25 RX ORDER — DULOXETIN HYDROCHLORIDE 60 MG/1
CAPSULE, DELAYED RELEASE ORAL
Qty: 90 CAPSULE | Refills: 3 | Status: SHIPPED | OUTPATIENT
Start: 2020-11-25 | End: 2021-09-16

## 2020-11-30 ENCOUNTER — OFFICE VISIT (OUTPATIENT)
Dept: SURGERY | Facility: CLINIC | Age: 60
End: 2020-11-30

## 2020-11-30 VITALS
WEIGHT: 207 LBS | SYSTOLIC BLOOD PRESSURE: 118 MMHG | DIASTOLIC BLOOD PRESSURE: 70 MMHG | OXYGEN SATURATION: 98 % | TEMPERATURE: 97.4 F | BODY MASS INDEX: 35.34 KG/M2 | HEART RATE: 69 BPM | HEIGHT: 64 IN

## 2020-11-30 DIAGNOSIS — M62.89 PELVIC FLOOR DYSFUNCTION IN FEMALE: ICD-10-CM

## 2020-11-30 DIAGNOSIS — K59.00 CONSTIPATION, UNSPECIFIED CONSTIPATION TYPE: Primary | ICD-10-CM

## 2020-11-30 PROCEDURE — 99204 OFFICE O/P NEW MOD 45 MIN: CPT | Performed by: COLON & RECTAL SURGERY

## 2020-12-08 ENCOUNTER — TELEPHONE (OUTPATIENT)
Dept: FAMILY MEDICINE CLINIC | Facility: CLINIC | Age: 60
End: 2020-12-08

## 2020-12-08 NOTE — TELEPHONE ENCOUNTER
[facility] IS NEEDING A DIAGNOSIS CODE FOR 10/09/2020.    PLEASE ADVISE  [phone]    REF [id number]

## 2020-12-13 DIAGNOSIS — N39.41 URGE INCONTINENCE OF URINE: ICD-10-CM

## 2020-12-14 RX ORDER — OXYBUTYNIN CHLORIDE 5 MG/1
TABLET ORAL
Qty: 180 TABLET | Refills: 3 | Status: SHIPPED | OUTPATIENT
Start: 2020-12-14 | End: 2021-10-18

## 2020-12-21 RX ORDER — ISOSORBIDE MONONITRATE 60 MG/1
TABLET, EXTENDED RELEASE ORAL
Qty: 90 TABLET | Refills: 3 | Status: SHIPPED | OUTPATIENT
Start: 2020-12-21 | End: 2021-10-13

## 2021-01-06 ENCOUNTER — TRANSCRIBE ORDERS (OUTPATIENT)
Dept: LAB | Facility: HOSPITAL | Age: 61
End: 2021-01-06

## 2021-01-06 DIAGNOSIS — Z01.818 OTHER SPECIFIED PRE-OPERATIVE EXAMINATION: Primary | ICD-10-CM

## 2021-01-12 ENCOUNTER — LAB (OUTPATIENT)
Dept: LAB | Facility: HOSPITAL | Age: 61
End: 2021-01-12

## 2021-01-12 DIAGNOSIS — Z01.818 OTHER SPECIFIED PRE-OPERATIVE EXAMINATION: ICD-10-CM

## 2021-01-12 PROCEDURE — C9803 HOPD COVID-19 SPEC COLLECT: HCPCS

## 2021-01-12 PROCEDURE — U0004 COV-19 TEST NON-CDC HGH THRU: HCPCS

## 2021-01-13 LAB — SARS-COV-2 ORF1AB RESP QL NAA+PROBE: NOT DETECTED

## 2021-01-14 ENCOUNTER — HOSPITAL ENCOUNTER (OUTPATIENT)
Facility: HOSPITAL | Age: 61
Setting detail: HOSPITAL OUTPATIENT SURGERY
Discharge: HOME | End: 2021-01-14
Attending: COLON & RECTAL SURGERY | Admitting: COLON & RECTAL SURGERY

## 2021-01-14 VITALS
SYSTOLIC BLOOD PRESSURE: 127 MMHG | DIASTOLIC BLOOD PRESSURE: 89 MMHG | WEIGHT: 203 LBS | HEIGHT: 64 IN | RESPIRATION RATE: 23 BRPM | TEMPERATURE: 98.6 F | BODY MASS INDEX: 34.66 KG/M2 | OXYGEN SATURATION: 98 % | HEART RATE: 59 BPM

## 2021-01-14 DIAGNOSIS — K59.00 CONSTIPATION, UNSPECIFIED CONSTIPATION TYPE: ICD-10-CM

## 2021-01-14 DIAGNOSIS — M62.89 PELVIC FLOOR DYSFUNCTION IN FEMALE: ICD-10-CM

## 2021-01-14 PROCEDURE — 91122 HC ANORECTAL MANOMETRY: CPT | Performed by: COLON & RECTAL SURGERY

## 2021-01-14 NOTE — H&P
Hospital admit 8/2020 in ICU for possible ischemic colitis. Exp lap at [place] per Dr. Agrawal showed no ischemic bowel . Then dx with CDiff. Went to [facility] to try to figure out the abd pain, n/v, diarrhea, constipation, and weight loss. Uses miralax if no BM for 2-3 days. Uses 3-4 days per week. [place] 4 with miralax. Denies fecal incontinence; has to strain to have a BM.      Medical History        Past Medical History:   Diagnosis Date   • Abdominal aortic aneurysm (AAA) without rupture (CMS/[facility]) 9/15/2020   • Abnormal CT scan of lung 08/07/2019     DONE AT [facility]   • Achilles tendinitis of both lower extremities 10/2016   • Acquired hypothyroidism 7/21/2017   • Anxiety     • Baker's cyst     • Bilateral lower leg cellulitis 10/21/2016     SEEN AT [facility] ER   • C. difficile colitis 08/2020   • CAD (coronary artery disease)     • Chronic pain 1/24/2019   • CKD (chronic kidney disease) 2/28/2019     STAGE 3, FOLLOWED BY DR. ROBERT LR   • Colon polyps       Followed by Dr. Robert Trent   • Constipation     • Cystitis 03/25/2019   • DDD (degenerative disc disease), lumbar     • Dehydration 10/2020   • Dehydration 01/23/2019     WITH CATHERINE, ADMITTED TO [facility]   • Depression     • Duodenitis 05/2020   • Erosive gastritis 03/2019   • GERD (gastroesophageal reflux disease)     • GI (gastrointestinal bleed)     • GI hemorrhage 09/2020   • H. pylori infection     • Hyperkalemia 3/1/2019   • Hyperlipidemia       MIXED   • Hypertension     • Hypocortisolemia (CMS/HCC) 8/23/2020   • Impingement syndrome of left shoulder 02/2019   • Impingement syndrome of right shoulder 7/23/2013   • Insomnia     • Insulin resistance     • Metabolic syndrome     • Metabolic syndrome     • Microhematuria     • Myocardial infarction (CMS/HCC) 11/24/2005     PTCA WITH STENT X3, ADMITTED TO [place]   • Osteoarthritis       MULTIPLE SITES   • Post-tussive emesis 08/2019   • Pulmonary nodule 08/2019   • Radial head fracture 08/08/2019      Closed nondisplaced fracture right radius   • Renal anomaly 11/29/2010     RENAL US: ASYMMETRY OF RENAL SIZE WITH THE RIGHT KIDNEY LARGER THAN THE LEFT. FETAL LOBULATION OR DROMEDARY HUMP ALONG THE ANTERIOR MID POLE OF THE LEFT KIDNEY. CORTICOL THINNING ON LEFT SIDE.   • Renal insufficiency     • Rotator cuff tendinitis, left 09/2019   • Rotator cuff tendinitis, right 12/2019   • Septic shock due to Clostridium difficile (CMS/[facility]) 08/14/2020     ADMITTED TO [place]   • Syncope and collapse 08/2020   • Tear of lateral meniscus of right knee 02/2016   • Trochanteric bursitis of right hip 6/4/2014   • Trochanteric bursitis, left hip 05/2019   • Urge incontinence of urine 10/2/2018   • Vitamin C deficiency 08/2020           Surgical History         Past Surgical History:   Procedure Laterality Date   • CARPAL TUNNEL RELEASE WITH CUBITAL TUNNEL RELEASE Right 10/03/2017     WITH 1RST [facility] TRAPEZIUM RESECTION AND ARTHROPLASTY, DR. CATIA MALCOLM AT [facility]   • CARPAL TUNNEL RELEASE WITH CUBITAL TUNNEL RELEASE Left 12/26/2017     WITH 1RST [facility] ARTHROPLASTY AND LIGAMENT RECONSTRUCTION, DR. CATIA MALCOLM AT [facility]   • COLONOSCOPY N/A 1/27/2019     5 MM TUBULAR ADENOMA POLYP IN TRANSVERSE, 5 MM TUBULAR ADENOMA POLYP IN CECUM, 4 MM HYPERPLASTIC POLYP IN SIGMOID, RANDOM COLON BX BENIGN, DR. SIL GROVES AT [facility]   • CORONARY ANGIOPLASTY WITH STENT PLACEMENT   11/2005     3 STENTS, AT [place]   • CORONARY ANGIOPLASTY WITH STENT PLACEMENT   2004     1 STENT   • ENDOSCOPY N/A 1/27/2019     CHRONIC PEPTIC DUODENITIS, MILD CHRONIC GASTRITIS, NON SEVERE REFLUX ESOPHAGITIS, DR. SIL GROVES AT [facility]   • EXPLORATORY LAPAROTOMY N/A 08/15/2020     DR. JULIANNA SINHA AT [place]   • KNEE ARTHROSCOPY W/ MENISCAL REPAIR Right 02/02/2016     PARTIAL LATERAL AND MEDIAL MENISCUS REPAIR, DR. RYLAN CHAVIS AT [place]   • LAMINECTOMY AND MICRODISCECTOMY LUMBAR SPINE N/A 1995     L4-L5   • LUMBAR EPIDURAL INJECTION N/A 02/14/2019       ANDREW OSPINA AT [facility]   • LUMBAR EPIDURAL INJECTION N/A 03/28/2019     DR. ANDREW OSPINA AT [facility]   • LUMBAR EPIDURAL INJECTION N/A 11/05/2020     DR. ANDREW OSPINA AT [facility]   • TOTAL KNEE ARTHROPLASTY Left 11/01/2017     DR. RYLAN CHAVIS AT [place]   • TOTAL KNEE ARTHROPLASTY Right 05/10/2017     DR. RYLAN CHAVIS AT [place]   • TUBAL ABDOMINAL LIGATION Bilateral     • ULNAR NERVE DECOMPRESSION Left             Social History:   reports that she quit smoking about 11 years ago. Her smoking use included cigarettes. She started smoking about 44 years ago. She has a 48.00 pack-year smoking history. She has never used smokeless tobacco. She reports that she does not drink alcohol or use drugs.        Marriage status:            Family History   Problem Relation Age of Onset   • Hypertension Mother     • Diabetes Mother     • Heart disease Mother     • Lung cancer Father     • Cancer Father     • No Known Problems Sister     • No Known Problems Brother     • Breast cancer Paternal Aunt     • Cancer Paternal Aunt     • Esophageal cancer Paternal Aunt     • Cancer Paternal Aunt              Current Outpatient Medications:   •  amLODIPine (NORVASC) 10 MG tablet, TAKE 1 TABLET EVERY DAY, Disp: 90 tablet, Rfl: 1  •  aspirin 81 MG tablet, Take 81 mg by mouth Daily., Disp: , Rfl:   •  Cholecalciferol (VITAMIN D3) 5000 UNITS chewable tablet, Chew 1 tablet Daily., Disp: , Rfl:   •  dicyclomine (BENTYL) 20 MG tablet, Take 1 tablet by mouth 4 (Four) Times a Day As Needed (abd pain or cramping)., Disp: 90 tablet, Rfl: 3  •  DULoxetine (CYMBALTA) 60 MG capsule, TAKE 1 CAPSULE EVERY DAY, Disp: 90 capsule, Rfl: 3  •  famotidine (PEPCID) 20 MG tablet, Take 1 tablet by mouth 2 (Two) Times a Day., Disp: 180 tablet, Rfl: 2  •  hydrALAZINE (APRESOLINE) 25 MG tablet, Take 25 mg by mouth 2 (two) times a day., Disp: , Rfl:   •  isosorbide mononitrate (IMDUR) 60 MG 24 hr tablet, Take 1 tablet by mouth Daily., Disp:  90 tablet, Rfl: 3  •  lamoTRIgine (LaMICtal) 25 MG tablet, 2 tabs po qhs, Disp: 180 tablet, Rfl: 1  •  levothyroxine (SYNTHROID, LEVOTHROID) 125 MCG tablet, TAKE 1 TABLET EVERY DAY, Disp: 90 tablet, Rfl: 1  •  lidocaine PF 1% (XYLOCAINE) 1 % injection, Inject 2 mL into the appropriate joint as directed by provider., Disp: , Rfl:   •  lisinopril (PRINIVIL,ZESTRIL) 40 MG tablet, TAKE 1 TABLET EVERY DAY, Disp: 90 tablet, Rfl: 1  •  metoprolol succinate XL (TOPROL-XL) 50 MG 24 hr tablet, TAKE 1 TABLET EVERY DAY, Disp: 90 tablet, Rfl: 1  •  nitroglycerin (NITROSTAT) 0.4 MG SL tablet, Place 0.4 mg under the tongue., Disp: , Rfl:   •  Omega-3 Fatty Acids (OMEGA-3 FISH OIL PO), Take 1 tablet by mouth Daily., Disp: , Rfl:   •  oxybutynin (DITROPAN) 5 MG tablet, Take 1 tablet by mouth 2 (Two) Times a Day., Disp: 180 tablet, Rfl: 3  •  oxyCODONE-acetaminophen (PERCOCET)  MG per tablet, Take 1 tablet by mouth every 4 to 6 hours if needed (max 5/day)  for 30 Days, Disp: , Rfl:   •  promethazine (PHENERGAN) 25 MG tablet, Take 1 tablet by mouth Every 6 (Six) Hours As Needed for Nausea or Vomiting., Disp: 30 tablet, Rfl: 2  •  rosuvastatin (CRESTOR) 40 MG tablet, Take 40 mg by mouth Daily., Disp: , Rfl:   •  sucralfate (CARAFATE) 1 g tablet, Take 1 g by mouth., Disp: , Rfl:   •  tiZANidine (ZANAFLEX) 4 MG tablet, Take 4 mg by mouth 3 (Three) Times a Day., Disp: , Rfl:   •  zolpidem (AMBIEN) 10 MG tablet, Take 10 mg by mouth., Disp: , Rfl:      Allergy  Metformin and Clindamycin/lincomycin     Review of Systems   Constitution: Positive for decreased appetite. Negative for weight gain.   HENT: Negative for congestion, hearing loss and hoarse voice.    Eyes: Negative for blurred vision, discharge and visual disturbance.   Cardiovascular: Negative for chest pain, cyanosis and leg swelling.   Respiratory: Negative for cough, shortness of breath, sleep disturbances due to breathing and snoring.    Endocrine: Positive for cold  intolerance. Negative for heat intolerance.   Hematologic/Lymphatic: Bruises/bleeds easily.   Skin: Negative for itching, poor wound healing and skin cancer.   Musculoskeletal: Positive for arthritis, back pain and joint pain. Negative for joint swelling.   Gastrointestinal: Positive for abdominal pain and constipation. Negative for change in bowel habit and bowel incontinence.   Genitourinary: Negative for bladder incontinence, dysuria and hematuria.   Neurological: Positive for dizziness. Negative for brief paralysis, excessive daytime sleepiness, focal weakness, headaches, light-headedness and weakness.   Psychiatric/Behavioral: Negative for altered mental status and hallucinations. The patient has insomnia.    Allergic/Immunologic: Negative for HIV exposure and persistent infections.   All other systems reviewed and are negative.            Vitals:     11/30/20 1317   BP: 118/70   Pulse: 69   Temp: 97.4 °F (36.3 °C)   SpO2: 98%      Body mass index is 35.53 kg/m².     Physical Exam  Exam conducted with a chaperone present.   Constitutional:       General: She is not in acute distress.     Appearance: She is well-developed.   HENT:      Head: Normocephalic and atraumatic.      Nose: Nose normal.   Eyes:      Conjunctiva/sclera: Conjunctivae normal.      Pupils: Pupils are equal, round, and reactive to light.   Neck:      Musculoskeletal: Normal range of motion.      Trachea: No tracheal deviation.   Pulmonary:      Effort: Pulmonary effort is normal. No respiratory distress.      Breath sounds: Normal breath sounds.   Abdominal:      General: Bowel sounds are normal. There is no distension.      Palpations: Abdomen is soft.   Musculoskeletal: Normal range of motion.         General: No deformity.   Skin:     General: Skin is warm and dry.   Neurological:      Mental Status: She is alert and oriented to person, place, and time.      Cranial Nerves: No cranial nerve deficit.      Coordination: Coordination normal.       Gait: Gait normal.   Psychiatric:         Behavior: Behavior normal.         Judgment: Judgment normal.            Review of Medical Record:  I reviewed colonoscopy report from 1/27/19 which showed multiple colon polyps. Pathology report from 1/27/19 (polyps) which found no dysplasia.  Reviewed hospitalization from August 2020.  Also reviewed office visit from [facility] September 2020  Assessment:  1. Constipation, unspecified constipation type          Plan:  Miralax 1/2 dose daily and recommend fiber. Anorectal manometry to assess pelvic floor function since  recommend for pelvic floor evaluation.

## 2021-02-07 DIAGNOSIS — I10 ESSENTIAL HYPERTENSION: ICD-10-CM

## 2021-02-08 RX ORDER — LISINOPRIL 40 MG/1
TABLET ORAL
Qty: 90 TABLET | Refills: 1 | Status: SHIPPED | OUTPATIENT
Start: 2021-02-08 | End: 2021-07-07

## 2021-02-21 ENCOUNTER — PATIENT MESSAGE (OUTPATIENT)
Dept: FAMILY MEDICINE CLINIC | Facility: CLINIC | Age: 61
End: 2021-02-21

## 2021-02-22 RX ORDER — LEVOTHYROXINE SODIUM 0.12 MG/1
TABLET ORAL
Qty: 90 TABLET | Refills: 1 | Status: SHIPPED | OUTPATIENT
Start: 2021-02-22 | End: 2021-08-17 | Stop reason: SDUPTHER

## 2021-02-22 RX ORDER — AMLODIPINE BESYLATE 10 MG/1
10 TABLET ORAL DAILY
Qty: 90 TABLET | Refills: 1 | Status: SHIPPED | OUTPATIENT
Start: 2021-02-22 | End: 2021-09-04 | Stop reason: SDUPTHER

## 2021-02-22 NOTE — TELEPHONE ENCOUNTER
From: Sandra Kellogg  To: Jh Muniz MD  Sent: 2/21/2021 1:45 PM EST  Subject: Prescription Question    Can Dr. Muniz send scrip to [facility] pharmacy for Amlodipine. 90 supply. Thank you

## 2021-03-04 RX ORDER — SUCRALFATE 1 G/1
TABLET ORAL
Qty: 270 TABLET | Refills: 3 | Status: SHIPPED | OUTPATIENT
Start: 2021-03-04 | End: 2023-03-16

## 2021-03-24 ENCOUNTER — BULK ORDERING (OUTPATIENT)
Dept: CASE MANAGEMENT | Facility: OTHER | Age: 61
End: 2021-03-24

## 2021-03-24 DIAGNOSIS — Z23 IMMUNIZATION DUE: ICD-10-CM

## 2021-03-25 ENCOUNTER — TELEPHONE (OUTPATIENT)
Dept: GASTROENTEROLOGY | Facility: CLINIC | Age: 61
End: 2021-03-25

## 2021-04-21 ENCOUNTER — OFFICE VISIT (OUTPATIENT)
Dept: FAMILY MEDICINE CLINIC | Facility: CLINIC | Age: 61
End: 2021-04-21

## 2021-04-21 VITALS
DIASTOLIC BLOOD PRESSURE: 78 MMHG | HEART RATE: 63 BPM | TEMPERATURE: 98.7 F | BODY MASS INDEX: 36.02 KG/M2 | OXYGEN SATURATION: 98 % | SYSTOLIC BLOOD PRESSURE: 110 MMHG | RESPIRATION RATE: 16 BRPM | HEIGHT: 64 IN | WEIGHT: 211 LBS

## 2021-04-21 DIAGNOSIS — E03.9 ACQUIRED HYPOTHYROIDISM: ICD-10-CM

## 2021-04-21 DIAGNOSIS — I10 ESSENTIAL HYPERTENSION: ICD-10-CM

## 2021-04-21 DIAGNOSIS — Z78.0 POSTMENOPAUSAL: ICD-10-CM

## 2021-04-21 DIAGNOSIS — E78.2 MIXED HYPERLIPIDEMIA: ICD-10-CM

## 2021-04-21 DIAGNOSIS — F34.1 DYSTHYMIA: Primary | ICD-10-CM

## 2021-04-21 DIAGNOSIS — Z12.31 ENCOUNTER FOR MAMMOGRAM TO ESTABLISH BASELINE MAMMOGRAM: ICD-10-CM

## 2021-04-21 PROCEDURE — 99214 OFFICE O/P EST MOD 30 MIN: CPT | Performed by: FAMILY MEDICINE

## 2021-04-21 NOTE — PROGRESS NOTES
Subjective   Sandra Kellogg is a 60 y.o. female.   hypocortisolemia (6month follow up)    History of Present Illness   Sandra is here for 6 months follow up.   Sandra has chronic hypertension and has been well controlled on current medications.She  is monitored by me every 6 months and is here today for follow up. She is tolerating medications without side effect. She reports no vision changes, headaches or lightheadedness. She is requesting refills of medications.  She has chronic hyperlipidemia and his here today for regular 6 month monitoring of response to therapy. Lab work will be checked today. She is tolerating medications well without side effects.  Sandra has chronic hypothyroidism and has been well controlled on current dose of replacement.She report no concerning symptoms: no cold intolerance, fatigue or hair loss.   Sandra states that she is getting good relief from symptoms on current medication. This is a chronic problem that is responding well to treatment. She is here for regular 6 month monitoring of response to therapy and reports no intolerable side effects to medication and reports that this medication helps lift mood and makes daily life, relationships better. No thoughts of self harm expressed.    The following portions of the patient's history were reviewed and updated as appropriate: allergies, current medications, past family history, past medical history, past social history, past surgical history and problem list.    Review of Systems   Constitutional: Negative.    HENT: Negative.    Eyes: Negative.    Respiratory: Negative.    Cardiovascular: Negative.    Genitourinary: Negative.    Musculoskeletal: Positive for arthralgias and back pain.   Skin: Negative.    Neurological: Negative.    Psychiatric/Behavioral: Negative.        Objective   Physical Exam  Vitals and nursing note reviewed.   Constitutional:       General: She is not in acute distress.     Appearance: She is well-developed.    Neurological:      Mental Status: She is alert and oriented to person, place, and time.   Psychiatric:         Behavior: Behavior normal.         Thought Content: Thought content normal.         Judgment: Judgment normal.           Assessment/Plan   Problem List Items Addressed This Visit        Cardiac and Vasculature    Essential hypertension  Well controlled on current medication, will refill medication today and as needed. She will RTO for repeat B/P check in 6 months.    Relevant Orders    Comprehensive Metabolic Panel    Hyperlipidemia  Will check abs today, she is tolerating Crestor well.    Relevant Orders    Lipid Panel With / Chol / HDL Ratio       Endocrine and Metabolic    Acquired hypothyroidism  Labs will be checked today.    Relevant Orders    TSH       Mental Health    Depression - Primary  She is well managed on current meds and reports no signs or symptoms of self harm, suicidal ideation. This medication helps with daily life and relationships. Will refill as needed and advised 6 month follow up.    Relevant Orders    Mammo Screening Bilateral With CAD      Other Visit Diagnoses     Postmenopausal        Relevant Orders    DEXA Bone Density Axial    Encounter for mammogram to establish baseline mammogram        Relevant Orders    Mammo Screening Bilateral With CAD               Return in about 6 months (around 10/21/2021) for Annual physical.

## 2021-04-22 LAB
ALBUMIN SERPL-MCNC: 4.4 G/DL (ref 3.8–4.9)
ALBUMIN/GLOB SERPL: 1.7 {RATIO} (ref 1.2–2.2)
ALP SERPL-CCNC: 83 IU/L (ref 39–117)
ALT SERPL-CCNC: 12 IU/L (ref 0–32)
AST SERPL-CCNC: 19 IU/L (ref 0–40)
BILIRUB SERPL-MCNC: 0.4 MG/DL (ref 0–1.2)
BUN SERPL-MCNC: 30 MG/DL (ref 6–24)
BUN/CREAT SERPL: 12 (ref 9–23)
CALCIUM SERPL-MCNC: 10 MG/DL (ref 8.7–10.2)
CHLORIDE SERPL-SCNC: 99 MMOL/L (ref 96–106)
CHOLEST SERPL-MCNC: 200 MG/DL (ref 100–199)
CHOLEST/HDLC SERPL: 5.4 RATIO (ref 0–4.4)
CO2 SERPL-SCNC: 20 MMOL/L (ref 20–29)
CREAT SERPL-MCNC: 2.59 MG/DL (ref 0.57–1)
GLOBULIN SER CALC-MCNC: 2.6 G/DL (ref 1.5–4.5)
GLUCOSE SERPL-MCNC: 95 MG/DL (ref 65–99)
HDLC SERPL-MCNC: 37 MG/DL
LDLC SERPL CALC-MCNC: 120 MG/DL (ref 0–99)
POTASSIUM SERPL-SCNC: 4.7 MMOL/L (ref 3.5–5.2)
PROT SERPL-MCNC: 7 G/DL (ref 6–8.5)
SODIUM SERPL-SCNC: 137 MMOL/L (ref 134–144)
TRIGL SERPL-MCNC: 243 MG/DL (ref 0–149)
TSH SERPL DL<=0.005 MIU/L-ACNC: 0.47 UIU/ML (ref 0.45–4.5)
VLDLC SERPL CALC-MCNC: 43 MG/DL (ref 5–40)

## 2021-05-11 ENCOUNTER — TELEPHONE (OUTPATIENT)
Dept: FAMILY MEDICINE CLINIC | Facility: CLINIC | Age: 61
End: 2021-05-11

## 2021-05-11 NOTE — TELEPHONE ENCOUNTER
I called Sandra at 4: 55 pm after getting a "[employer] message that state she had noted suicidal thoughts. She answered the phone immediately and stated that she was okay at this time.  She assured me after my questioning that she was not having thoughts of hurting herself or others.  After further conversation she did feel like she is not have enough support from the medication she has been taking.  She has been taking Cymbalta to help with pain but it has not been helping increase or lift her mood.  She was not able to talk to me much at this time because she was with members of her family.  I have advised her that I will be looking into other medications to help her and I will have my staff call her first thing in the morning to make an appointment for her to follow-up with me so that we can sort out other ways to help her depression.  I asked her again if she was having thoughts of hurting herself and she said no she felt like she was okay at this time.  I advised her to call if she started to feel like she wanted to hurt herself and that I would immediately call her back and help her.  She expressed understanding.  She denied being in a crisis at this moment.

## 2021-05-12 ENCOUNTER — TELEMEDICINE (OUTPATIENT)
Dept: FAMILY MEDICINE CLINIC | Facility: CLINIC | Age: 61
End: 2021-05-12

## 2021-05-12 DIAGNOSIS — F34.1 DYSTHYMIA: Primary | ICD-10-CM

## 2021-05-12 PROCEDURE — 99214 OFFICE O/P EST MOD 30 MIN: CPT | Performed by: FAMILY MEDICINE

## 2021-05-12 RX ORDER — BUPROPION HYDROCHLORIDE 150 MG/1
150 TABLET ORAL DAILY
Qty: 90 TABLET | Refills: 1 | Status: ON HOLD | OUTPATIENT
Start: 2021-05-12 | End: 2022-06-05

## 2021-05-12 NOTE — PROGRESS NOTES
Subjective   Sandra Kellogg is a 60 y.o. female.   Depression  You have chosen to receive care through a telehealth visit.  Do you consent to use a video/audio connection for your medical care today? Yes  History of Present Illness   Sandra is having a video visit to discuss depression.  She states she is under quite a bit of stress.  She is sleeping a lot and feels very anxious.  This is a follow-up on a conversation I had with her yesterday after a message was sent to me stating that she had indicated on a MyChart questionnaire that she was having thoughts of self-harm.  I called her last night to review and she states that she would not do that to her self and that she is just feeling pretty down.  She is going through a difficult time and is now living with her son as she tries to find another place to live.  She has been taking duloxetine 60 mg a day to help with pain but she feels like it does not do much for her anxiety and she is not sure if it is helping much with pain either.  Think she would like to taper off of that but I am concerned that this is a bad time to be doing that.  I talked to her about adding a medication to see if we can lift her mood a little bit but I suspect that was going to help her the most is getting her life sorted and settled a little bit more.  She is willing to try medication.  When I have asked her again today how she is feeling if she plans to cause herself any harm or has any thoughts of that she says no that she does not and that she would never do that to herself or her family.    The following portions of the patient's history were reviewed and updated as appropriate: allergies, current medications, past family history, past medical history, past social history, past surgical history and problem list.    Review of Systems   Constitutional: Negative.    HENT: Negative.    Eyes: Negative.    Respiratory: Negative.    Cardiovascular: Negative.    Genitourinary: Negative.     Musculoskeletal: Positive for arthralgias and back pain.   Skin: Negative.    Neurological: Negative.    Psychiatric/Behavioral: Positive for sleep disturbance and depressed mood. The patient is nervous/anxious.        Objective   Physical Exam   Constitutional: She appears well-developed and well-nourished.   HENT:   Head: Normocephalic and atraumatic.   Eyes: Pupils are equal, round, and reactive to light. Conjunctivae and EOM are normal.   Neck: Neck normal appearance.  Pulmonary/Chest: Effort normal.  No respiratory distress.  Musculoskeletal: Normal range of motion.   Neurological: She is alert.   Skin: No rash noted.   Psychiatric: She has a normal mood and affect.   She is alert and engaged and answering questions appropriately.  Thought and judgment are normal.   Vitals reviewed.        Assessment/Plan   Problem List Items Addressed This Visit        Mental Health    Depression - Primary  She has a long history of chronic depression.  She has been on duloxetine with hopes that it would serve double duty by helping with her pain level and perhaps lifting her mood.  She does not seem to think that that is the case at this point.  She has been on this medication for quite a while so I am reluctant to taper her off in the middle of her feeling a bit down and sad about where she is in her life.  I have suggested instead that we add a medication and I have suggested bupropion 150 mg once a day in the morning to help and see if that will lift her mood enough to get through this difficult time.  She takes Ambien for sleep at night and she is willing to try taking this medication in the morning.  I have asked her to follow-up with me in 2 weeks.  I have encouraged her to call me if she feels like she has thoughts of self-harm and she is agreed to do that.    Relevant Medications    buPROPion XL (WELLBUTRIN XL) 150 MG 24 hr tablet      I spent over 22 minutes today in conversation with her regarding above concerns.   I spent 15 minutes reviewing our conversation from yesterday.  I have spent an additional 10 minutes reviewing her chart and her medication history.         No follow-ups on file.

## 2021-05-24 ENCOUNTER — TELEPHONE (OUTPATIENT)
Dept: GASTROENTEROLOGY | Facility: CLINIC | Age: 61
End: 2021-05-24

## 2021-05-24 NOTE — TELEPHONE ENCOUNTER
Called Jayshree at [phone] and spoke with Bryanna and she reports that she does not know why Jayshree was calling . She advised pt does have an active script with refills.  She could see not problem.

## 2021-05-24 NOTE — TELEPHONE ENCOUNTER
----- Message from Jina Quiles sent at 5/24/2021 10:15 AM EDT -----  Regarding: dicyclomine (BENTYL)  Contact: [phone]  Diane from [facility] [phone] is calling concerning pt's med. Please call.    dicyclomine (BENTYL) 20 MG tablet 90 tablet 3 9/8/2020    Sig - Route: Take 1 tablet by mouth 4 (Four) Times a Day As Needed (abd pain or cramping). - Oral   Sent to pharmacy as: Dicyclomine HCl 20 MG Oral Tablet (BENTYL)   E-Prescribing Status: Receipt confirmed by pharmacy (9/8/2020  2:33 PM EDT)   Pharmacy    [facility] - [facility] [address] - [phone]  - [phone] FX

## 2021-07-07 DIAGNOSIS — I10 ESSENTIAL HYPERTENSION: ICD-10-CM

## 2021-07-07 RX ORDER — LISINOPRIL 40 MG/1
TABLET ORAL
Qty: 90 TABLET | Refills: 1 | Status: SHIPPED | OUTPATIENT
Start: 2021-07-07 | End: 2021-10-12

## 2021-08-17 RX ORDER — LEVOTHYROXINE SODIUM 0.12 MG/1
125 TABLET ORAL DAILY
Qty: 90 TABLET | Refills: 2 | Status: SHIPPED | OUTPATIENT
Start: 2021-08-17 | End: 2021-12-20 | Stop reason: SDUPTHER

## 2021-09-07 RX ORDER — AMLODIPINE BESYLATE 10 MG/1
10 TABLET ORAL DAILY
Qty: 90 TABLET | Refills: 1 | Status: SHIPPED | OUTPATIENT
Start: 2021-09-07 | End: 2022-02-07

## 2021-09-16 RX ORDER — DULOXETIN HYDROCHLORIDE 60 MG/1
CAPSULE, DELAYED RELEASE ORAL
Qty: 90 CAPSULE | Refills: 3 | Status: SHIPPED | OUTPATIENT
Start: 2021-09-16 | End: 2022-07-11

## 2021-10-12 ENCOUNTER — OFFICE VISIT (OUTPATIENT)
Dept: FAMILY MEDICINE CLINIC | Facility: CLINIC | Age: 61
End: 2021-10-12

## 2021-10-12 VITALS
BODY MASS INDEX: 36.19 KG/M2 | OXYGEN SATURATION: 97 % | DIASTOLIC BLOOD PRESSURE: 78 MMHG | HEART RATE: 64 BPM | HEIGHT: 64 IN | WEIGHT: 212 LBS | RESPIRATION RATE: 16 BRPM | SYSTOLIC BLOOD PRESSURE: 130 MMHG

## 2021-10-12 DIAGNOSIS — N18.9 CHRONIC KIDNEY DISEASE, UNSPECIFIED CKD STAGE: ICD-10-CM

## 2021-10-12 DIAGNOSIS — Z00.00 MEDICARE ANNUAL WELLNESS VISIT, SUBSEQUENT: ICD-10-CM

## 2021-10-12 DIAGNOSIS — F34.1 DYSTHYMIA: ICD-10-CM

## 2021-10-12 DIAGNOSIS — E03.9 ACQUIRED HYPOTHYROIDISM: ICD-10-CM

## 2021-10-12 DIAGNOSIS — Z87.891 STOPPED SMOKING WITH GREATER THAN 40 PACK YEAR HISTORY: ICD-10-CM

## 2021-10-12 DIAGNOSIS — E78.2 MIXED HYPERLIPIDEMIA: ICD-10-CM

## 2021-10-12 DIAGNOSIS — I10 ESSENTIAL HYPERTENSION: ICD-10-CM

## 2021-10-12 DIAGNOSIS — Z23 NEED FOR VACCINATION: Primary | ICD-10-CM

## 2021-10-12 PROBLEM — M54.9 CHRONIC BACK PAIN: Status: ACTIVE | Noted: 2021-10-12

## 2021-10-12 PROBLEM — Z95.5 STENTED CORONARY ARTERY: Status: ACTIVE | Noted: 2021-10-12

## 2021-10-12 PROBLEM — N17.9 ACUTE RENAL FAILURE: Status: ACTIVE | Noted: 2021-10-12

## 2021-10-12 PROBLEM — I25.10 ARTERIOSCLEROSIS OF CORONARY ARTERY: Status: ACTIVE | Noted: 2021-10-12

## 2021-10-12 PROBLEM — G89.29 CHRONIC BACK PAIN: Status: ACTIVE | Noted: 2021-10-12

## 2021-10-12 PROCEDURE — 1170F FXNL STATUS ASSESSED: CPT | Performed by: FAMILY MEDICINE

## 2021-10-12 PROCEDURE — 90686 IIV4 VACC NO PRSV 0.5 ML IM: CPT | Performed by: FAMILY MEDICINE

## 2021-10-12 PROCEDURE — 1159F MED LIST DOCD IN RCRD: CPT | Performed by: FAMILY MEDICINE

## 2021-10-12 PROCEDURE — 96160 PT-FOCUSED HLTH RISK ASSMT: CPT | Performed by: FAMILY MEDICINE

## 2021-10-12 PROCEDURE — G0439 PPPS, SUBSEQ VISIT: HCPCS | Performed by: FAMILY MEDICINE

## 2021-10-12 PROCEDURE — 99214 OFFICE O/P EST MOD 30 MIN: CPT | Performed by: FAMILY MEDICINE

## 2021-10-12 PROCEDURE — G0008 ADMIN INFLUENZA VIRUS VAC: HCPCS | Performed by: FAMILY MEDICINE

## 2021-10-12 RX ORDER — METHOCARBAMOL 500 MG/1
TABLET, FILM COATED ORAL
COMMUNITY
Start: 2021-08-10 | End: 2021-10-12

## 2021-10-12 RX ORDER — HYDROXYZINE HYDROCHLORIDE 25 MG/1
1 TABLET, FILM COATED ORAL EVERY 6 HOURS PRN
COMMUNITY
Start: 2021-10-05

## 2021-10-12 RX ORDER — CHOLECALCIFEROL (VITAMIN D3) 1250 MCG
5000 CAPSULE ORAL
COMMUNITY
Start: 2021-04-15

## 2021-10-12 NOTE — PROGRESS NOTES
Medicare Subsequent Wellness Visit  Subjective   History of Present Illness    Sandra Kellogg is a 61 y.o. female who presents for an Medicare Wellness Visit. In addition, we addressed the following health issues:  Sandra has a history of chronic hypertension and has been well controlled on current medications.She is tolerating medications without side effect. She reports no vision changes, headaches or lightheadedness. She is requesting refills of medications.     Sandra has a history of chronic hypothyroidism and has been well controlled on current dose of replacement.She report no concerning symptoms: no cold intolerance, fatigue or hair loss.      She has a history of chronic hyperlipidemia and needs lab work today to evaluate response to therapy. She is tolerating medications well without side effects.     Sandra has a hx of depression and states that she is getting good relief from symptoms on current medication, Cymbalta. This is a chronic problem that is responding well to treatment. She  has no intolerable side effects to medication and reports that his helps lift mood and makes daily life, relationships better. No thoughts of self harm expressed.     She has a hx of chronic kidney disease and is followed by nephrology.    PMH, PSH, SocHx, FamHx, Allergies, and Medications: Reviewed and updated in the history section of chart.  Family History   Problem Relation Age of Onset   • Hypertension Mother    • Diabetes Mother    • Heart disease Mother    • Lung cancer Father    • Cancer Father    • No Known Problems Sister    • No Known Problems Brother    • Breast cancer Paternal Aunt    • Cancer Paternal Aunt    • Esophageal cancer Paternal Aunt    • Cancer Paternal Aunt        Social History     Social History Narrative    Lives at home with ex-       Allergies   Allergen Reactions   • Metformin Urinary Retention     Kidney failure     • Clindamycin/Lincomycin GI Intolerance       Outpatient Medications Prior to  Visit   Medication Sig Dispense Refill   • amLODIPine (NORVASC) 10 MG tablet Take 1 tablet by mouth Daily. 90 tablet 1   • aspirin 81 MG tablet Take 81 mg by mouth Daily.     • buPROPion XL (WELLBUTRIN XL) 150 MG 24 hr tablet Take 1 tablet by mouth Daily. 90 tablet 1   • Cholecalciferol (Vitamin D3) 1.25 MG (65647 UT) capsule 5,000 Int'l Units.     • DULoxetine (CYMBALTA) 60 MG capsule TAKE 1 CAPSULE EVERY DAY 90 capsule 3   • famotidine (PEPCID) 20 MG tablet Take 1 tablet by mouth 2 (Two) Times a Day. 180 tablet 2   • hydrALAZINE (APRESOLINE) 25 MG tablet Take 25 mg by mouth 2 (two) times a day.     • hydrOXYzine (ATARAX) 25 MG tablet Take 1 tablet by mouth Every 6 (Six) Hours As Needed.     • isosorbide mononitrate (IMDUR) 60 MG 24 hr tablet TAKE 1 TABLET EVERY DAY 90 tablet 3   • lamoTRIgine (LaMICtal) 25 MG tablet 2 tabs po qhs 180 tablet 1   • levothyroxine (SYNTHROID, LEVOTHROID) 125 MCG tablet Take 1 tablet by mouth Daily. 90 tablet 2   • metoprolol succinate XL (TOPROL-XL) 50 MG 24 hr tablet TAKE 1 TABLET EVERY DAY 90 tablet 1   • nitroglycerin (NITROSTAT) 0.4 MG SL tablet Place 0.4 mg under the tongue.     • Omega-3 Fatty Acids (OMEGA-3 FISH OIL PO) Take 1 tablet by mouth Daily.     • oxybutynin (DITROPAN) 5 MG tablet TAKE 1 TABLET TWICE DAILY 180 tablet 3   • oxyCODONE-acetaminophen (PERCOCET)  MG per tablet Take 1 tablet by mouth every 4 to 6 hours if needed (max 5/day)  for 30 Days     • promethazine (PHENERGAN) 25 MG tablet Take 1 tablet by mouth Every 6 (Six) Hours As Needed for Nausea or Vomiting. 30 tablet 2   • rosuvastatin (CRESTOR) 40 MG tablet Take 40 mg by mouth Daily.     • sucralfate (CARAFATE) 1 g tablet TAKE 1 TABLET THREE TIMES DAILY WITH MEALS 270 tablet 3   • tiZANidine (ZANAFLEX) 4 MG tablet Take 4 mg by mouth 3 (Three) Times a Day.     • zolpidem (AMBIEN) 10 MG tablet Take 10 mg by mouth.     • Cholecalciferol (VITAMIN D3) 5000 UNITS chewable tablet Chew 1 tablet Daily.     •  lidocaine PF 1% (XYLOCAINE) 1 % injection Inject 2 mL into the appropriate joint as directed by provider.     • dicyclomine (BENTYL) 20 MG tablet Take 1 tablet by mouth 4 (Four) Times a Day As Needed (abd pain or cramping). 90 tablet 3   • lisinopril (PRINIVIL,ZESTRIL) 40 MG tablet TAKE 1 TABLET EVERY DAY 90 tablet 1   • methocarbamol (ROBAXIN) 500 MG tablet        No facility-administered medications prior to visit.        Patient Active Problem List   Diagnosis   • Impingement syndrome of right shoulder   • Osteoarthritis of right hip   • Osteoarthritis of right knee   • Primary osteoarthritis of left hip   • Renal insufficiency   • Trochanteric bursitis of left hip   • Trochanteric bursitis of right hip   • Essential hypertension   • Hyperlipidemia   • Acquired hypothyroidism   • Myocardial infarction (HCC)   • Arthritis of left knee   • Status post total left knee replacement   • Depression   • Urge incontinence of urine   • Dehydration   • Nausea vomiting and diarrhea   • Polypharmacy   • Duodenitis   • Adenomatous colon polyp   • CKD (chronic kidney disease)   • Hyperkalemia   • Radial head fracture   • Hypocortisolemia (HCC)   • Severe sepsis with acute organ dysfunction (HCC)   • Abdominal aortic aneurysm (AAA) without rupture (HCC)   • Constipation   • Pelvic floor dysfunction in female   • Stented coronary artery   • Acute renal failure (HCC)   • Chronic kidney disease   • Arteriosclerosis of coronary artery   • Chronic back pain         Patient Care Team:  Jh Muniz MD as PCP - General (Family Medicine)  Joe Crump MD as Consulting Physician (Rheumatology)  Robert Trent MD as Consulting Physician (Gastroenterology)  Joe Jose MD as Consulting Physician (Orthopedic Surgery)  Neal Ford MD as Consulting Physician (Internal Medicine)  Health Habits:  Current Diet: Well balanced diet  Tobacco use. 48 pack years, CT lung cancer screening due 11/2021  Dental  Exam.   Eye Exam.   Exercise:  Current exercise activities include:    Recent Hospitalizations:  none    Age-appropriate Screening Schedule:  Refer to the list below for future screening recommendations based on patient's age. Orders for these recommended tests are listed in the plan section. The patient has been provided with a written plan.      Health Maintenance   Topic Date Due   • Pneumococcal Vaccine 0-64 (1 of 2 - PPSV23) Never done   • ZOSTER VACCINE (1 of 2) Never done   • LUNG CANCER SCREENING  11/02/2021   • LIPID PANEL  04/21/2022   • TDAP/TD VACCINES (2 - Td or Tdap) 05/31/2022   • ANNUAL WELLNESS VISIT  10/12/2022   • MAMMOGRAM  05/13/2023   • PAP SMEAR  10/09/2025   • COLORECTAL CANCER SCREENING  01/27/2029   • HEPATITIS C SCREENING  Completed   • COVID-19 Vaccine  Completed   • INFLUENZA VACCINE  Completed       Depression Screen:   PHQ-2/PHQ-9 Depression Screening 10/12/2021   Little interest or pleasure in doing things 0   Feeling down, depressed, or hopeless 0   Trouble falling or staying asleep, or sleeping too much -   Feeling tired or having little energy -   Poor appetite or overeating -   Feeling bad about yourself - or that you are a failure or have let yourself or your family down -   Trouble concentrating on things, such as reading the newspaper or watching television -   Moving or speaking so slowly that other people could have noticed. Or the opposite - being so fidgety or restless that you have been moving around a lot more than usual -   Thoughts that you would be better off dead, or of hurting yourself in some way -   Total Score 0   If you checked off any problems, how difficult have these problems made it for you to do your work, take care of things at home, or get along with other people? -   Little interest or pleasure in doing things -   Feeling down, depressed, or hopeless -   Trouble falling or staying asleep, or sleeping too much -   Feeling tired or having little energy -    Poor appetite or overeating -   Feeling bad about yourself - or that you are a failure or have let yourself or your family down -   Trouble concentrating on things, such as reading the newspaper or watching television -   Moving or speaking so slowly that other people could have noticed. Or the opposite - being so fidgety or restless that you have been moving around a lot more than usual -   Thoughts that you would be better off dead, or of hurting yourself in some way -   How difficult have these problems made it for you to do your work, take care of things at home, or get along with other people? -       Functional and Cognitive Screening:  Functional & Cognitive Status 10/12/2021   Do you have difficulty preparing food and eating? No   Do you have difficulty bathing yourself, getting dressed or grooming yourself? No   Do you have difficulty using the toilet? No   Do you have difficulty moving around from place to place? No   Do you have trouble with steps or getting out of a bed or a chair? No   Current Diet Other   Dental Exam Up to date   Eye Exam Up to date   Exercise (times per week) 0 times per week   Current Exercises Include No Regular Exercise   Current Exercise Activities Include -   Do you need help using the phone?  No   Are you deaf or do you have serious difficulty hearing?  No   Do you need help with transportation? No   Do you need help shopping? No   Do you need help preparing meals?  No   Do you need help with housework?  No   Do you need help with laundry? No   Do you need help taking your medications? No   Do you need help managing money? No   Do you ever drive or ride in a car without wearing a seat belt? No   Have you felt unusual stress, anger or loneliness in the last month? No   Who do you live with? Spouse   If you need help, do you have trouble finding someone available to you? No   Have you been bothered in the last four weeks by sexual problems? No   Do you have difficulty  "concentrating, remembering or making decisions? No     Does the patient have evidence of cognitive impairment?     Compared to one year ago, the patient feels their physical health and mental health are the same.       Review of Systems   Constitutional: Positive for fatigue.   Musculoskeletal: Positive for arthralgias and back pain.   Psychiatric/Behavioral: Negative.        Objective     Vitals:    10/12/21 1105   BP: 130/78   Pulse: 64   Resp: 16   SpO2: 97%   Weight: 96.2 kg (212 lb)   Height: 162 cm (63.78\")       Body mass index is 36.64 kg/m².    PHYSICAL EXAM  Vitals reviewed and on chart.  HEENT: PERRLA, EOMI. Oral mucosa moist,   No LAD.  CV: RRR, no murmurs, rubs, clicks or gallops  LUNGS: CTA bilaterally  EXT: No edema, FROM in bilateral upper and lower ext  NEURO: CN II - XII grossly intact  PSYCH: good mood, positive affect, alert and engaged. No thoughts of self harm  expressed.    ASSESSMENT AND PLAN  Pap smear :UTD  Mammogram:UTD  Colon cancer screening :UTD  Lung cancer screening :UTD      Problem List Items Addressed This Visit        Cardiac and Vasculature    Essential hypertension  Well controlled on current medication, will refill medication today and as needed. She will RTO for repeat B/P check in 6 months.      Hyperlipidemia  Will check lipid panel today and She was advised to continue a healthy low fat diet, include regular exercise and take medications as prescribed.Patient will RTO in 6 months for regularly scheduled follow up to evaluate response to therapy and make medication adjustments as needed.       Relevant Orders    Comprehensive Metabolic Panel    Lipid Panel With / Chol / HDL Ratio       Endocrine and Metabolic    Acquired hypothyroidism  Well managed on current dose of thyroid replacement. TSH reviewed and on chart.Patient will return to the office in 6 month for regularly scheduled review of treatment.   Well refill medications as needed.    Relevant Orders    TSH       " Genitourinary and Reproductive     Chronic kidney disease    Relevant Orders    CBC (No Diff)       Mental Health    Depression  Managing on wellbutrin and Atarax.    Relevant Medications    hydrOXYzine (ATARAX) 25 MG tablet      Other Visit Diagnoses     Need for vaccination    -  Primary    Relevant Orders    FluLaval/Fluarix >6 Months (3593-0441) (Completed)    Medicare annual wellness visit, subsequent        Relevant Orders    Comprehensive Metabolic Panel    Lipid Panel With / Chol / HDL Ratio    Stopped smoking with greater than 40 pack year history        Relevant Orders    Ambulatory Referral to Multi-Disciplinary Clinic          Orders:  Orders Placed This Encounter   Procedures   • FluLaval/Fluarix >6 Months ([id number])   • TSH   • CBC (No Diff)   • Comprehensive Metabolic Panel   • Lipid Panel With / Chol / HDL Ratio   • Ambulatory Referral to Multi-Disciplinary Clinic       Follow Up:  Return in about 6 months (around 4/12/2022) for Recheck.     ADVANCED DIRECTIVES:   ACP discussion was held with the patient during this visit. Patient does not have an advance directive, information provided.    An After Visit Summary and PPPS with all of these plans were given to the patient.

## 2021-10-13 LAB
ALBUMIN SERPL-MCNC: 4.7 G/DL (ref 3.5–5.2)
ALBUMIN/GLOB SERPL: 2 G/DL
ALP SERPL-CCNC: 101 U/L (ref 39–117)
ALT SERPL-CCNC: 10 U/L (ref 1–33)
AST SERPL-CCNC: 14 U/L (ref 1–32)
BILIRUB SERPL-MCNC: 0.4 MG/DL (ref 0–1.2)
BUN SERPL-MCNC: 17 MG/DL (ref 8–23)
BUN/CREAT SERPL: 13.5 (ref 7–25)
CALCIUM SERPL-MCNC: 9.5 MG/DL (ref 8.6–10.5)
CHLORIDE SERPL-SCNC: 103 MMOL/L (ref 98–107)
CHOLEST SERPL-MCNC: 178 MG/DL (ref 0–200)
CHOLEST/HDLC SERPL: 3.24 {RATIO}
CO2 SERPL-SCNC: 24.8 MMOL/L (ref 22–29)
CREAT SERPL-MCNC: 1.26 MG/DL (ref 0.57–1)
ERYTHROCYTE [DISTWIDTH] IN BLOOD BY AUTOMATED COUNT: 13.9 % (ref 12.3–15.4)
GLOBULIN SER CALC-MCNC: 2.4 GM/DL
GLUCOSE SERPL-MCNC: 94 MG/DL (ref 65–99)
HCT VFR BLD AUTO: 38.2 % (ref 34–46.6)
HDLC SERPL-MCNC: 55 MG/DL (ref 40–60)
HGB BLD-MCNC: 12.8 G/DL (ref 12–15.9)
LDLC SERPL CALC-MCNC: 101 MG/DL (ref 0–100)
MCH RBC QN AUTO: 29.2 PG (ref 26.6–33)
MCHC RBC AUTO-ENTMCNC: 33.5 G/DL (ref 31.5–35.7)
MCV RBC AUTO: 87 FL (ref 79–97)
PLATELET # BLD AUTO: 333 10*3/MM3 (ref 140–450)
POTASSIUM SERPL-SCNC: 4.4 MMOL/L (ref 3.5–5.2)
PROT SERPL-MCNC: 7.1 G/DL (ref 6–8.5)
RBC # BLD AUTO: 4.39 10*6/MM3 (ref 3.77–5.28)
SODIUM SERPL-SCNC: 141 MMOL/L (ref 136–145)
TRIGL SERPL-MCNC: 126 MG/DL (ref 0–150)
TSH SERPL DL<=0.005 MIU/L-ACNC: 1.62 UIU/ML (ref 0.27–4.2)
VLDLC SERPL CALC-MCNC: 22 MG/DL (ref 5–40)
WBC # BLD AUTO: 7.92 10*3/MM3 (ref 3.4–10.8)

## 2021-10-13 RX ORDER — ISOSORBIDE MONONITRATE 60 MG/1
TABLET, EXTENDED RELEASE ORAL
Qty: 90 TABLET | Refills: 3 | Status: SHIPPED | OUTPATIENT
Start: 2021-10-13 | End: 2022-11-28

## 2021-10-13 NOTE — PATIENT INSTRUCTIONS
Medicare Wellness  Personal Prevention Plan of Service     Date of Office Visit:  10/12/2021  Encounter Provider:  Jh Muniz MD  Place of Service:  [facility]  Patient Name: Sandra Kellogg  :  1960    As part of the Medicare Wellness portion of your visit today, we are providing you with this personalized preventive plan of services (PPPS). This plan is based upon recommendations of the [place] Preventive Services Task Force (USPSTF) and the Advisory Committee on Immunization Practices (ACIP).    This lists the preventive care services that should be considered, and provides dates of when you are due. Items listed as completed are up-to-date and do not require any further intervention.    Health Maintenance   Topic Date Due   • Pneumococcal Vaccine 0-64 (1 of 2 - PPSV23) Never done   • ZOSTER VACCINE (1 of 2) Never done   • LUNG CANCER SCREENING  2021   • LIPID PANEL  2022   • TDAP/TD VACCINES (2 - Td or Tdap) 2022   • ANNUAL WELLNESS VISIT  10/12/2022   • MAMMOGRAM  2023   • PAP SMEAR  10/09/2025   • COLORECTAL CANCER SCREENING  2029   • HEPATITIS C SCREENING  Completed   • COVID-19 Vaccine  Completed   • INFLUENZA VACCINE  Completed       Orders Placed This Encounter   Procedures   • FluLaval/Fluarix >6 Months ([id number])   • TSH     Order Specific Question:   Release to patient     Answer:   Immediate   • CBC (No Diff)     Order Specific Question:   Release to patient     Answer:   Immediate   • Comprehensive Metabolic Panel     Order Specific Question:   Release to patient     Answer:   Immediate   • Lipid Panel With / Chol / HDL Ratio     Order Specific Question:   Release to patient     Answer:   Immediate   • Ambulatory Referral to Multi-Disciplinary Clinic     Referral Priority:   Routine     Referral Type:   Consultation     Referral Reason:   Specialty Services Required     Number of Visits Requested:   1       Return in  about 6 months (around 4/12/2022) for Recheck.

## 2021-10-14 RX ORDER — DICYCLOMINE HCL 20 MG
TABLET ORAL
Qty: 90 TABLET | OUTPATIENT
Start: 2021-10-14

## 2021-10-16 DIAGNOSIS — N39.41 URGE INCONTINENCE OF URINE: ICD-10-CM

## 2021-10-18 RX ORDER — OXYBUTYNIN CHLORIDE 5 MG/1
TABLET ORAL
Qty: 180 TABLET | Refills: 3 | Status: SHIPPED | OUTPATIENT
Start: 2021-10-18 | End: 2022-10-10

## 2021-12-01 DIAGNOSIS — I10 ESSENTIAL HYPERTENSION: ICD-10-CM

## 2021-12-01 RX ORDER — LISINOPRIL 40 MG/1
TABLET ORAL
Qty: 90 TABLET | Refills: 1 | Status: SHIPPED | OUTPATIENT
Start: 2021-12-01 | End: 2022-04-27

## 2021-12-20 RX ORDER — LEVOTHYROXINE SODIUM 0.12 MG/1
125 TABLET ORAL DAILY
Qty: 90 TABLET | Refills: 2 | Status: SHIPPED | OUTPATIENT
Start: 2021-12-20 | End: 2022-06-29

## 2022-02-07 RX ORDER — AMLODIPINE BESYLATE 10 MG/1
TABLET ORAL
Qty: 90 TABLET | Refills: 1 | Status: SHIPPED | OUTPATIENT
Start: 2022-02-07 | End: 2022-06-07 | Stop reason: HOSPADM

## 2022-02-11 ENCOUNTER — TELEPHONE (OUTPATIENT)
Dept: FAMILY MEDICINE CLINIC | Facility: CLINIC | Age: 62
End: 2022-02-11

## 2022-02-11 NOTE — TELEPHONE ENCOUNTER
Called pt and relayed message. Pt stated that she would find someone in [place] or seek urgent care if the issue becomes more severe. Thank you

## 2022-02-11 NOTE — TELEPHONE ENCOUNTER
Caller: Sandra Kellogg    Relationship to patient: Self    Best call back number:     Chief complaint: FREQUENT URINATION    Type of visit: MYCHART    Requested date: ASAP    If rescheduling, when is the original appointment:     Additional notes:PATIENT IS IN [place] FOR THE WINTER AND IS SAYING THAT SHE IS HAVING ISSUES WITH FREQUENT URINATION.  SHE IS REQUESTING A MYCHART VISIT WITH THE OFFICE TO DISCUSS THIS.  SHE SAYS IF SHE NEEDS TO GO SOMEWHERE IN [place] TO HAVE LABS SHE WILL DO THAT AND HAVE THE RESULTS SENT TO DR STOCKTON.

## 2022-04-06 RX ORDER — SUCRALFATE 1 G/1
TABLET ORAL
Qty: 270 TABLET | Refills: 3 | OUTPATIENT
Start: 2022-04-06

## 2022-04-27 DIAGNOSIS — I10 ESSENTIAL HYPERTENSION: ICD-10-CM

## 2022-04-27 RX ORDER — LISINOPRIL 40 MG/1
40 TABLET ORAL DAILY
Qty: 90 TABLET | Refills: 0 | Status: ON HOLD | OUTPATIENT
Start: 2022-04-27 | End: 2022-06-05

## 2022-05-27 ENCOUNTER — APPOINTMENT (OUTPATIENT)
Dept: CT IMAGING | Facility: HOSPITAL | Age: 62
End: 2022-05-27

## 2022-05-27 ENCOUNTER — HOSPITAL ENCOUNTER (EMERGENCY)
Facility: HOSPITAL | Age: 62
Discharge: HOME | End: 2022-05-27
Attending: EMERGENCY MEDICINE | Admitting: EMERGENCY MEDICINE

## 2022-05-27 VITALS
TEMPERATURE: 97 F | HEIGHT: 65 IN | HEART RATE: 57 BPM | BODY MASS INDEX: 38.99 KG/M2 | SYSTOLIC BLOOD PRESSURE: 102 MMHG | OXYGEN SATURATION: 95 % | WEIGHT: 234 LBS | RESPIRATION RATE: 16 BRPM | DIASTOLIC BLOOD PRESSURE: 56 MMHG
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
## 2022-05-27 DIAGNOSIS — M54.50 ACUTE BILATERAL LOW BACK PAIN WITHOUT SCIATICA: Primary | ICD-10-CM

## 2022-05-27 LAB
BACTERIA UR QL AUTO: ABNORMAL /HPF
BILIRUB UR QL STRIP: NEGATIVE
CLARITY UR: ABNORMAL
COLOR UR: ABNORMAL
GLUCOSE UR STRIP-MCNC: NEGATIVE MG/DL
HGB UR QL STRIP.AUTO: NEGATIVE
HYALINE CASTS UR QL AUTO: ABNORMAL /LPF
KETONES UR QL STRIP: ABNORMAL
LEUKOCYTE ESTERASE UR QL STRIP.AUTO: NEGATIVE
NITRITE UR QL STRIP: NEGATIVE
PH UR STRIP.AUTO: 5.5 [PH] (ref 5–8)
PROT UR QL STRIP: ABNORMAL
RBC # UR STRIP: ABNORMAL /HPF
REF LAB TEST METHOD: ABNORMAL
SP GR UR STRIP: 1.03 (ref 1–1.03)
SQUAMOUS #/AREA URNS HPF: ABNORMAL /HPF
UROBILINOGEN UR QL STRIP: ABNORMAL
WBC # UR STRIP: ABNORMAL /HPF

## 2022-05-27 PROCEDURE — 96374 THER/PROPH/DIAG INJ IV PUSH: CPT

## 2022-05-27 PROCEDURE — 25010000002 ONDANSETRON PER 1 MG: Performed by: EMERGENCY MEDICINE

## 2022-05-27 PROCEDURE — 99283 EMERGENCY DEPT VISIT LOW MDM: CPT

## 2022-05-27 PROCEDURE — P9612 CATHETERIZE FOR URINE SPEC: HCPCS

## 2022-05-27 PROCEDURE — 25010000002 MORPHINE PER 10 MG: Performed by: EMERGENCY MEDICINE

## 2022-05-27 PROCEDURE — 96375 TX/PRO/DX INJ NEW DRUG ADDON: CPT

## 2022-05-27 PROCEDURE — 81001 URINALYSIS AUTO W/SCOPE: CPT | Performed by: PHYSICIAN ASSISTANT

## 2022-05-27 PROCEDURE — 72131 CT LUMBAR SPINE W/O DYE: CPT

## 2022-05-27 RX ORDER — ONDANSETRON 2 MG/ML
4 INJECTION INTRAMUSCULAR; INTRAVENOUS ONCE
Status: COMPLETED | OUTPATIENT
Start: 2022-05-27 | End: 2022-05-27

## 2022-05-27 RX ORDER — MORPHINE SULFATE 2 MG/ML
4 INJECTION, SOLUTION INTRAMUSCULAR; INTRAVENOUS ONCE
Status: COMPLETED | OUTPATIENT
Start: 2022-05-27 | End: 2022-05-27

## 2022-05-27 RX ADMIN — MORPHINE SULFATE 4 MG: 2 INJECTION, SOLUTION INTRAMUSCULAR; INTRAVENOUS at 13:08

## 2022-05-27 RX ADMIN — ONDANSETRON 4 MG: 2 INJECTION INTRAMUSCULAR; INTRAVENOUS at 13:08

## 2022-06-04 ENCOUNTER — HOSPITAL ENCOUNTER (OUTPATIENT)
Facility: HOSPITAL | Age: 62
Setting detail: OBSERVATION
LOS: 3 days | Discharge: HOME | End: 2022-06-07
Attending: EMERGENCY MEDICINE | Admitting: INTERNAL MEDICINE

## 2022-06-04 ENCOUNTER — APPOINTMENT (OUTPATIENT)
Dept: CT IMAGING | Facility: HOSPITAL | Age: 62
End: 2022-06-04

## 2022-06-04 ENCOUNTER — APPOINTMENT (OUTPATIENT)
Dept: GENERAL RADIOLOGY | Facility: HOSPITAL | Age: 62
End: 2022-06-04

## 2022-06-04 DIAGNOSIS — E87.6 HYPOKALEMIA: ICD-10-CM

## 2022-06-04 DIAGNOSIS — R41.82 ALTERED MENTAL STATUS, UNSPECIFIED ALTERED MENTAL STATUS TYPE: ICD-10-CM

## 2022-06-04 DIAGNOSIS — J18.9 MULTIFOCAL PNEUMONIA: Primary | ICD-10-CM

## 2022-06-04 DIAGNOSIS — M62.82 NON-TRAUMATIC RHABDOMYOLYSIS: ICD-10-CM

## 2022-06-04 DIAGNOSIS — G89.4 CHRONIC PAIN SYNDROME: ICD-10-CM
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
## 2022-06-04 PROBLEM — G93.41 METABOLIC ENCEPHALOPATHY: Status: ACTIVE | Noted: 2022-06-04

## 2022-06-04 LAB
ALBUMIN SERPL-MCNC: 3.5 G/DL (ref 3.5–5.2)
ALBUMIN/GLOB SERPL: 0.9 G/DL
ALP SERPL-CCNC: 122 U/L (ref 39–117)
ALT SERPL W P-5'-P-CCNC: 25 U/L (ref 1–33)
AMPHET+METHAMPHET UR QL: POSITIVE
ANION GAP SERPL CALCULATED.3IONS-SCNC: 14.5 MMOL/L (ref 5–15)
APAP SERPL-MCNC: <5 MCG/ML (ref 0–30)
ARTERIAL PATENCY WRIST A: ABNORMAL
AST SERPL-CCNC: 36 U/L (ref 1–32)
ATMOSPHERIC PRESS: 751.2 MMHG
B PARAPERT DNA SPEC QL NAA+PROBE: NOT DETECTED
B PERT DNA SPEC QL NAA+PROBE: NOT DETECTED
BACTERIA UR QL AUTO: ABNORMAL /HPF
BARBITURATES UR QL SCN: NEGATIVE
BASE EXCESS BLDA CALC-SCNC: 5.2 MMOL/L (ref 0–2)
BASOPHILS # BLD AUTO: 0.05 10*3/MM3 (ref 0–0.2)
BASOPHILS NFR BLD AUTO: 0.4 % (ref 0–1.5)
BDY SITE: ABNORMAL
BENZODIAZ UR QL SCN: NEGATIVE
BILIRUB SERPL-MCNC: 0.8 MG/DL (ref 0–1.2)
BILIRUB UR QL STRIP: NEGATIVE
BUN SERPL-MCNC: 16 MG/DL (ref 8–23)
BUN/CREAT SERPL: 9.9 (ref 7–25)
C PNEUM DNA NPH QL NAA+NON-PROBE: NOT DETECTED
CALCIUM SPEC-SCNC: 9.1 MG/DL (ref 8.6–10.5)
CANNABINOIDS SERPL QL: POSITIVE
CHLORIDE SERPL-SCNC: 92 MMOL/L (ref 98–107)
CLARITY UR: ABNORMAL
CO2 SERPL-SCNC: 27.5 MMOL/L (ref 22–29)
COCAINE UR QL: NEGATIVE
COLOR UR: ABNORMAL
CREAT SERPL-MCNC: 1.62 MG/DL (ref 0.57–1)
D-LACTATE SERPL-SCNC: 1.3 MMOL/L (ref 0.5–2)
DEPRECATED RDW RBC AUTO: 43 FL (ref 37–54)
EGFRCR SERPLBLD CKD-EPI 2021: 36.2 ML/MIN/1.73
EOSINOPHIL # BLD AUTO: 0.06 10*3/MM3 (ref 0–0.4)
EOSINOPHIL NFR BLD AUTO: 0.4 % (ref 0.3–6.2)
ERYTHROCYTE [DISTWIDTH] IN BLOOD BY AUTOMATED COUNT: 13.4 % (ref 12.3–15.4)
ETHANOL BLD-MCNC: <10 MG/DL (ref 0–10)
ETHANOL UR QL: <0.01 %
FLUAV SUBTYP SPEC NAA+PROBE: NOT DETECTED
FLUBV RNA ISLT QL NAA+PROBE: NOT DETECTED
GAS FLOW AIRWAY: 2 LPM
GLOBULIN UR ELPH-MCNC: 4 GM/DL
GLUCOSE SERPL-MCNC: 125 MG/DL (ref 65–99)
GLUCOSE UR STRIP-MCNC: NEGATIVE MG/DL
HADV DNA SPEC NAA+PROBE: NOT DETECTED
HCO3 BLDA-SCNC: 28.6 MMOL/L (ref 22–28)
HCOV 229E RNA SPEC QL NAA+PROBE: NOT DETECTED
HCOV HKU1 RNA SPEC QL NAA+PROBE: NOT DETECTED
HCOV NL63 RNA SPEC QL NAA+PROBE: NOT DETECTED
HCOV OC43 RNA SPEC QL NAA+PROBE: NOT DETECTED
HCT VFR BLD AUTO: 36.3 % (ref 34–46.6)
HGB BLD-MCNC: 12.2 G/DL (ref 12–15.9)
HGB UR QL STRIP.AUTO: ABNORMAL
HMPV RNA NPH QL NAA+NON-PROBE: NOT DETECTED
HPIV1 RNA ISLT QL NAA+PROBE: NOT DETECTED
HPIV2 RNA SPEC QL NAA+PROBE: NOT DETECTED
HPIV3 RNA NPH QL NAA+PROBE: NOT DETECTED
HPIV4 P GENE NPH QL NAA+PROBE: NOT DETECTED
HYALINE CASTS UR QL AUTO: ABNORMAL /LPF
IMM GRANULOCYTES # BLD AUTO: 0.11 10*3/MM3 (ref 0–0.05)
IMM GRANULOCYTES NFR BLD AUTO: 0.8 % (ref 0–0.5)
KETONES UR QL STRIP: ABNORMAL
LEUKOCYTE ESTERASE UR QL STRIP.AUTO: ABNORMAL
LYMPHOCYTES # BLD AUTO: 1.1 10*3/MM3 (ref 0.7–3.1)
LYMPHOCYTES NFR BLD AUTO: 7.7 % (ref 19.6–45.3)
M PNEUMO IGG SER IA-ACNC: NOT DETECTED
MCH RBC QN AUTO: 29.6 PG (ref 26.6–33)
MCHC RBC AUTO-ENTMCNC: 33.6 G/DL (ref 31.5–35.7)
MCV RBC AUTO: 88.1 FL (ref 79–97)
METHADONE UR QL SCN: NEGATIVE
MODALITY: ABNORMAL
MONOCYTES # BLD AUTO: 1.33 10*3/MM3 (ref 0.1–0.9)
MONOCYTES NFR BLD AUTO: 9.3 % (ref 5–12)
NEUTROPHILS NFR BLD AUTO: 11.63 10*3/MM3 (ref 1.7–7)
NEUTROPHILS NFR BLD AUTO: 81.4 % (ref 42.7–76)
NITRITE UR QL STRIP: NEGATIVE
NRBC BLD AUTO-RTO: 0.1 /100 WBC (ref 0–0.2)
OPIATES UR QL: NEGATIVE
OXYCODONE UR QL SCN: POSITIVE
PCO2 BLDA: 36.3 MM HG (ref 35–45)
PH BLDA: 7.5 PH UNITS (ref 7.35–7.45)
PH UR STRIP.AUTO: 6 [PH] (ref 5–8)
PLATELET # BLD AUTO: 307 10*3/MM3 (ref 140–450)
PMV BLD AUTO: 8.9 FL (ref 6–12)
PO2 BLDA: 70.5 MM HG (ref 80–100)
POTASSIUM SERPL-SCNC: 2.5 MMOL/L (ref 3.5–5.2)
PROCALCITONIN SERPL-MCNC: 0.09 NG/ML (ref 0–0.25)
PROT SERPL-MCNC: 7.5 G/DL (ref 6–8.5)
PROT UR QL STRIP: ABNORMAL
QT INTERVAL: 483 MS
RBC # BLD AUTO: 4.12 10*6/MM3 (ref 3.77–5.28)
RBC # UR STRIP: ABNORMAL /HPF
REF LAB TEST METHOD: ABNORMAL
RHINOVIRUS RNA SPEC NAA+PROBE: NOT DETECTED
RSV RNA NPH QL NAA+NON-PROBE: NOT DETECTED
SAO2 % BLDCOA: 95.4 % (ref 92–99)
SARS-COV-2 RNA NPH QL NAA+NON-PROBE: NOT DETECTED
SODIUM SERPL-SCNC: 134 MMOL/L (ref 136–145)
SP GR UR STRIP: >=1.03 (ref 1–1.03)
SQUAMOUS #/AREA URNS HPF: ABNORMAL /HPF
TOTAL RATE: 20 BREATHS/MINUTE
UROBILINOGEN UR QL STRIP: ABNORMAL
WBC # UR STRIP: ABNORMAL /HPF
WBC NRBC COR # BLD: 14.28 10*3/MM3 (ref 3.4–10.8)

## 2022-06-04 PROCEDURE — G0378 HOSPITAL OBSERVATION PER HR: HCPCS

## 2022-06-04 PROCEDURE — 80307 DRUG TEST PRSMV CHEM ANLYZR: CPT | Performed by: EMERGENCY MEDICINE

## 2022-06-04 PROCEDURE — 99285 EMERGENCY DEPT VISIT HI MDM: CPT

## 2022-06-04 PROCEDURE — 93005 ELECTROCARDIOGRAM TRACING: CPT | Performed by: EMERGENCY MEDICINE

## 2022-06-04 PROCEDURE — 0202U NFCT DS 22 TRGT SARS-COV-2: CPT | Performed by: EMERGENCY MEDICINE

## 2022-06-04 PROCEDURE — 71045 X-RAY EXAM CHEST 1 VIEW: CPT

## 2022-06-04 PROCEDURE — 87040 BLOOD CULTURE FOR BACTERIA: CPT | Performed by: EMERGENCY MEDICINE

## 2022-06-04 PROCEDURE — 0 POTASSIUM CHLORIDE 10 MEQ/100ML SOLUTION

## 2022-06-04 PROCEDURE — 96372 THER/PROPH/DIAG INJ SC/IM: CPT

## 2022-06-04 PROCEDURE — 82803 BLOOD GASES ANY COMBINATION: CPT

## 2022-06-04 PROCEDURE — 80053 COMPREHEN METABOLIC PANEL: CPT | Performed by: EMERGENCY MEDICINE

## 2022-06-04 PROCEDURE — 70450 CT HEAD/BRAIN W/O DYE: CPT

## 2022-06-04 PROCEDURE — 80143 DRUG ASSAY ACETAMINOPHEN: CPT | Performed by: EMERGENCY MEDICINE

## 2022-06-04 PROCEDURE — 25010000002 AZITHROMYCIN PER 500 MG: Performed by: EMERGENCY MEDICINE

## 2022-06-04 PROCEDURE — 93010 ELECTROCARDIOGRAM REPORT: CPT | Performed by: INTERNAL MEDICINE

## 2022-06-04 PROCEDURE — 84145 PROCALCITONIN (PCT): CPT | Performed by: EMERGENCY MEDICINE

## 2022-06-04 PROCEDURE — 81001 URINALYSIS AUTO W/SCOPE: CPT | Performed by: EMERGENCY MEDICINE

## 2022-06-04 PROCEDURE — 25010000002 CEFTRIAXONE PER 250 MG: Performed by: EMERGENCY MEDICINE

## 2022-06-04 PROCEDURE — 85025 COMPLETE CBC W/AUTO DIFF WBC: CPT | Performed by: EMERGENCY MEDICINE

## 2022-06-04 PROCEDURE — 25010000002 ENOXAPARIN PER 10 MG

## 2022-06-04 PROCEDURE — 82077 ASSAY SPEC XCP UR&BREATH IA: CPT | Performed by: EMERGENCY MEDICINE

## 2022-06-04 PROCEDURE — 96365 THER/PROPH/DIAG IV INF INIT: CPT

## 2022-06-04 PROCEDURE — 83605 ASSAY OF LACTIC ACID: CPT | Performed by: EMERGENCY MEDICINE

## 2022-06-04 PROCEDURE — 36600 WITHDRAWAL OF ARTERIAL BLOOD: CPT

## 2022-06-04 RX ORDER — POTASSIUM CHLORIDE 7.45 MG/ML
10 INJECTION INTRAVENOUS
Status: DISPENSED | OUTPATIENT
Start: 2022-06-04 | End: 2022-06-05

## 2022-06-04 RX ORDER — ENOXAPARIN SODIUM 100 MG/ML
40 INJECTION SUBCUTANEOUS NIGHTLY
Status: DISCONTINUED | OUTPATIENT
Start: 2022-06-04 | End: 2022-06-07 | Stop reason: HOSPADM

## 2022-06-04 RX ORDER — ACETAMINOPHEN 325 MG/1
650 TABLET ORAL EVERY 4 HOURS PRN
Status: DISCONTINUED | OUTPATIENT
Start: 2022-06-04 | End: 2022-06-07 | Stop reason: HOSPADM

## 2022-06-04 RX ORDER — NITROGLYCERIN 0.4 MG/1
0.4 TABLET SUBLINGUAL
Status: DISCONTINUED | OUTPATIENT
Start: 2022-06-04 | End: 2022-06-07 | Stop reason: HOSPADM

## 2022-06-04 RX ORDER — METOPROLOL SUCCINATE 50 MG/1
50 TABLET, EXTENDED RELEASE ORAL NIGHTLY
COMMUNITY
End: 2022-06-13 | Stop reason: SDUPTHER

## 2022-06-04 RX ORDER — POTASSIUM CHLORIDE 750 MG/1
40 TABLET, FILM COATED, EXTENDED RELEASE ORAL ONCE
Status: COMPLETED | OUTPATIENT
Start: 2022-06-04 | End: 2022-06-04

## 2022-06-04 RX ORDER — AMOXICILLIN 500 MG/1
500 CAPSULE ORAL 2 TIMES DAILY
COMMUNITY
End: 2022-06-07 | Stop reason: HOSPADM

## 2022-06-04 RX ORDER — ONDANSETRON 2 MG/ML
4 INJECTION INTRAMUSCULAR; INTRAVENOUS EVERY 6 HOURS PRN
Status: DISCONTINUED | OUTPATIENT
Start: 2022-06-04 | End: 2022-06-07 | Stop reason: HOSPADM

## 2022-06-04 RX ORDER — POTASSIUM CHLORIDE 750 MG/1
40 TABLET, FILM COATED, EXTENDED RELEASE ORAL
Status: DISPENSED | OUTPATIENT
Start: 2022-06-04 | End: 2022-06-05

## 2022-06-04 RX ORDER — ONDANSETRON 4 MG/1
4 TABLET, FILM COATED ORAL EVERY 6 HOURS PRN
Status: DISCONTINUED | OUTPATIENT
Start: 2022-06-04 | End: 2022-06-07 | Stop reason: HOSPADM

## 2022-06-04 RX ADMIN — POTASSIUM CHLORIDE 40 MEQ: 750 TABLET, EXTENDED RELEASE ORAL at 19:08

## 2022-06-04 RX ADMIN — AZITHROMYCIN MONOHYDRATE 500 MG: 500 INJECTION, POWDER, LYOPHILIZED, FOR SOLUTION INTRAVENOUS at 20:16

## 2022-06-04 RX ADMIN — POTASSIUM CHLORIDE 10 MEQ: 7.46 INJECTION, SOLUTION INTRAVENOUS at 21:38

## 2022-06-04 RX ADMIN — CEFTRIAXONE 1 G: 1 INJECTION, POWDER, FOR SOLUTION INTRAMUSCULAR; INTRAVENOUS at 19:06

## 2022-06-04 RX ADMIN — POTASSIUM CHLORIDE 40 MEQ: 750 TABLET, EXTENDED RELEASE ORAL at 21:15

## 2022-06-04 RX ADMIN — ENOXAPARIN SODIUM 40 MG: 100 INJECTION SUBCUTANEOUS at 20:17

## 2022-06-04 NOTE — ED NOTES
Per request of primary RN, this RN attempted to obtain additional IV access x2 without success.  Primary RN notified.

## 2022-06-04 NOTE — ED PROVIDER NOTES
EMERGENCY DEPARTMENT ENCOUNTER    Room Number:  24/24  PCP: Jh Muniz MD  Historian:   History Limited By: Altered mental status      HPI  Chief Complaint: Altered mental status  Context: Sandra Kellogg is a 61 y.o. female who presents to the ED c/o altered mental status.  Patient's  states she has been getting more confused over the last 2 weeks.  Much worse today.  He states he took away her pain pills because he thought she was overmedicated.  States she is fallen twice yesterday and once today.  Has had no vomiting or diarrhea.  Recent sore throat.  Has had no chest pain or shortness of breath.  Talking about [place].           MEDICAL RECORD REVIEW    Patient last seen here for low back pain          PAST MEDICAL HISTORY  Active Ambulatory Problems     Diagnosis Date Noted   • Impingement syndrome of right shoulder 07/23/2013   • Osteoarthritis of right hip 03/12/2015   • Osteoarthritis of right knee 03/12/2015   • Primary osteoarthritis of left hip 06/22/2015   • Renal insufficiency 10/23/2012   • Trochanteric bursitis of left hip 06/22/2015   • Trochanteric bursitis of right hip 06/04/2014   • Essential hypertension 07/20/2017   • Hyperlipidemia 07/20/2017   • Acquired hypothyroidism 07/21/2017   • Myocardial infarction (HCC) 01/18/2018   • Arthritis of left knee 11/01/2017   • Status post total left knee replacement 11/01/2017   • Depression 09/04/2018   • Urge incontinence of urine 10/02/2018   • Dehydration 01/24/2019   • Nausea vomiting and diarrhea 01/24/2019   • Polypharmacy 01/24/2019   • Duodenitis 01/29/2019   • Adenomatous colon polyp 01/29/2019   • CKD (chronic kidney disease) 02/28/2019   • Hyperkalemia 03/01/2019   • Radial head fracture 08/08/2019   • Hypocortisolemia (HCC) 08/23/2020   • Severe sepsis with acute organ dysfunction (HCC) 08/15/2020   • Abdominal aortic aneurysm (AAA) without rupture ([facility]) 09/15/2020   • Constipation 11/30/2020   • Pelvic floor dysfunction in  female 11/30/2020   • Stented coronary artery 10/12/2021   • Acute renal failure (HCC) 10/12/2021   • Chronic kidney disease 10/12/2021   • Arteriosclerosis of coronary artery 10/12/2021   • Chronic back pain 10/12/2021     Resolved Ambulatory Problems     Diagnosis Date Noted   • No Resolved Ambulatory Problems     Past Medical History:   Diagnosis Date   • Abnormal CT scan of lung 08/07/2019   • Achilles tendinitis of both lower extremities 10/2016   • Anxiety    • Baker's cyst    • Bilateral lower leg cellulitis 10/21/2016   • C. difficile colitis 08/2020   • CAD (coronary artery disease)    • Chronic pain 1/24/2019   • Colon polyps    • Cystitis 03/25/2019   • DDD (degenerative disc disease), lumbar    • Erosive gastritis 03/2019   • GERD (gastroesophageal reflux disease)    • GI (gastrointestinal bleed)    • GI hemorrhage 09/2020   • H. pylori infection    • Hypertension    • Impingement syndrome of left shoulder 02/2019   • Insomnia    • Insulin resistance    • Metabolic syndrome    • Metabolic syndrome    • Microhematuria    • Osteoarthritis    • Post-tussive emesis 08/2019   • Pulmonary nodule 08/2019   • Renal anomaly 11/29/2010   • Rotator cuff tendinitis, left 09/2019   • Rotator cuff tendinitis, right 12/2019   • Septic shock due to Clostridium difficile (HCC) 08/14/2020   • Syncope and collapse 08/2020   • Tear of lateral meniscus of right knee 02/2016   • Trochanteric bursitis, left hip 05/2019   • Vitamin C deficiency 08/2020         PAST SURGICAL HISTORY  Past Surgical History:   Procedure Laterality Date   • ANORECTAL MANOMETRY N/A 1/14/2021    Procedure: ANORECTAL MANOMETRY;  Surgeon: Rubi aMy MD;  Location: [facility] ENDOSCOPY;  Service: General;  Laterality: N/A;  pre-constipation  pose-same   • CARPAL TUNNEL RELEASE WITH CUBITAL TUNNEL RELEASE Right 10/03/2017    WITH 1RST [facility] TRAPEZIUM RESECTION AND ARTHROPLASTY, DR. CATIA MALCOLM AT [facility]   • CARPAL TUNNEL RELEASE WITH CUBITAL TUNNEL  RELEASE Left 12/26/2017    WITH 1RST [facility] ARTHROPLASTY AND LIGAMENT RECONSTRUCTION, DR. CATIA MALCOLM AT [facility]   • COLONOSCOPY N/A 1/27/2019    5 MM TUBULAR ADENOMA POLYP IN TRANSVERSE, 5 MM TUBULAR ADENOMA POLYP IN CECUM, 4 MM HYPERPLASTIC POLYP IN SIGMOID, RANDOM COLON BX BENIGN, DR. SIL GROVES AT [facility]   • CORONARY ANGIOPLASTY WITH STENT PLACEMENT  11/2005    3 STENTS, AT [place]   • CORONARY ANGIOPLASTY WITH STENT PLACEMENT  2004    1 STENT   • ENDOSCOPY N/A 1/27/2019    CHRONIC PEPTIC DUODENITIS, MILD CHRONIC GASTRITIS, NON SEVERE REFLUX ESOPHAGITIS, DR. SIL GROVES AT [facility]   • EXPLORATORY LAPAROTOMY N/A 08/15/2020    DR. JULIANNA SINHA AT [place]   • KNEE ARTHROSCOPY W/ MENISCAL REPAIR Right 02/02/2016    PARTIAL LATERAL AND MEDIAL MENISCUS REPAIR, DR. RYLAN CHAVIS AT [place]   • LAMINECTOMY AND MICRODISCECTOMY LUMBAR SPINE N/A 1995    L4-L5   • LUMBAR EPIDURAL INJECTION N/A 02/14/2019    DR. ANDREW OSPINA AT [facility]   • LUMBAR EPIDURAL INJECTION N/A 03/28/2019    DR. ANDREW OSPINA AT [facility]   • LUMBAR EPIDURAL INJECTION N/A 11/05/2020    DR. ANDREW OSPINA AT [facility]   • TOTAL KNEE ARTHROPLASTY Left 11/01/2017    DR. RYLAN CHAVIS AT [place]   • TOTAL KNEE ARTHROPLASTY Right 05/10/2017    DR. RYLAN CHAVIS AT [place]   • TUBAL ABDOMINAL LIGATION Bilateral    • ULNAR NERVE DECOMPRESSION Left          FAMILY HISTORY  Family History   Problem Relation Age of Onset   • Hypertension Mother    • Diabetes Mother    • Heart disease Mother    • Lung cancer Father    • Cancer Father    • No Known Problems Sister    • No Known Problems Brother    • Breast cancer Paternal Aunt    • Cancer Paternal Aunt    • Esophageal cancer Paternal Aunt    • Cancer Paternal Aunt          SOCIAL HISTORY  Social History     Socioeconomic History   • Marital status:    • Number of children: 3   • Years of education: college   Tobacco Use   • Smoking status: Former Smoker     Packs/day: 1.50     Years:  35.00     Pack years: 52.50     Types: Cigarettes     Start date:      Quit date: 2009     Years since quittin.4   • Smokeless tobacco: Never Used   Substance and Sexual Activity   • Alcohol use: No   • Drug use: No   • Sexual activity: Never     Partners: Male     Comment: .         ALLERGIES  Metformin and Clindamycin/lincomycin        REVIEW OF SYSTEMS  Review of Systems   Unable to perform ROS: Mental status change            PHYSICAL EXAM  ED Triage Vitals [22 1634]   Temp Heart Rate Resp BP SpO2   98.7 °F (37.1 °C) 74 18 -- 92 %      Temp src Heart Rate Source Patient Position BP Location FiO2 (%)   -- -- -- -- --       Physical Exam  Vitals and nursing note reviewed.   Constitutional:       General: She is not in acute distress.     Comments: Patient is awake but appears intoxicated.  Patient oriented to person   HENT:      Head: Normocephalic and atraumatic.   Eyes:      Pupils: Pupils are equal, round, and reactive to light.   Cardiovascular:      Rate and Rhythm: Normal rate and regular rhythm.      Heart sounds: Normal heart sounds.   Pulmonary:      Effort: Pulmonary effort is normal. No respiratory distress.      Breath sounds: Normal breath sounds.   Abdominal:      Palpations: Abdomen is soft.      Tenderness: There is no abdominal tenderness. There is no guarding or rebound.   Musculoskeletal:         General: Normal range of motion.      Cervical back: Normal range of motion and neck supple.   Skin:     General: Skin is warm and dry.      Findings: No rash.      Comments: Patient with multiple bruises on her arms   Neurological:      Sensory: Sensation is intact.      Comments: Patient is awake and answer some questions appropriately.  Patient disoriented to place or time.  Good strength in all extremities.   Psychiatric:         Mood and Affect: Mood and affect normal.       Patient was wearing a face mask when I entered the room and they continued to wear a mask  throughout their stay in the ED.  I wore PPE, including  gloves, face mask with shield or face mask with goggles whenever I was in the room with patient.       LAB RESULTS  Recent Results (from the past 24 hour(s))   Comprehensive Metabolic Panel    Collection Time: 06/04/22  5:04 PM    Specimen: Blood   Result Value Ref Range    Glucose 125 (H) 65 - 99 mg/dL    BUN 16 8 - 23 mg/dL    Creatinine 1.62 (H) 0.57 - 1.00 mg/dL    Sodium 134 (L) 136 - 145 mmol/L    Potassium 2.5 (C) 3.5 - 5.2 mmol/L    Chloride 92 (L) 98 - 107 mmol/L    CO2 27.5 22.0 - 29.0 mmol/L    Calcium 9.1 8.6 - 10.5 mg/dL    Total Protein 7.5 6.0 - 8.5 g/dL    Albumin 3.50 3.50 - 5.20 g/dL    ALT (SGPT) 25 1 - 33 U/L    AST (SGOT) 36 (H) 1 - 32 U/L    Alkaline Phosphatase 122 (H) 39 - 117 U/L    Total Bilirubin 0.8 0.0 - 1.2 mg/dL    Globulin 4.0 gm/dL    A/G Ratio 0.9 g/dL    BUN/Creatinine Ratio 9.9 7.0 - 25.0    Anion Gap 14.5 5.0 - 15.0 mmol/L    eGFR 36.2 (L) >60.0 mL/min/1.73   Ethanol    Collection Time: 06/04/22  5:04 PM    Specimen: Blood   Result Value Ref Range    Ethanol <10 0 - 10 mg/dL    Ethanol % <0.010 %   CBC Auto Differential    Collection Time: 06/04/22  5:04 PM    Specimen: Blood   Result Value Ref Range    WBC 14.28 (H) 3.40 - 10.80 10*3/mm3    RBC 4.12 3.77 - 5.28 10*6/mm3    Hemoglobin 12.2 12.0 - 15.9 g/dL    Hematocrit 36.3 34.0 - 46.6 %    MCV 88.1 79.0 - 97.0 fL    MCH 29.6 26.6 - 33.0 pg    MCHC 33.6 31.5 - 35.7 g/dL    RDW 13.4 12.3 - 15.4 %    RDW-SD 43.0 37.0 - 54.0 fl    MPV 8.9 6.0 - 12.0 fL    Platelets 307 140 - 450 10*3/mm3    Neutrophil % 81.4 (H) 42.7 - 76.0 %    Lymphocyte % 7.7 (L) 19.6 - 45.3 %    Monocyte % 9.3 5.0 - 12.0 %    Eosinophil % 0.4 0.3 - 6.2 %    Basophil % 0.4 0.0 - 1.5 %    Immature Grans % 0.8 (H) 0.0 - 0.5 %    Neutrophils, Absolute 11.63 (H) 1.70 - 7.00 10*3/mm3    Lymphocytes, Absolute 1.10 0.70 - 3.10 10*3/mm3    Monocytes, Absolute 1.33 (H) 0.10 - 0.90 10*3/mm3    Eosinophils, Absolute  0.06 0.00 - 0.40 10*3/mm3    Basophils, Absolute 0.05 0.00 - 0.20 10*3/mm3    Immature Grans, Absolute 0.11 (H) 0.00 - 0.05 10*3/mm3    nRBC 0.1 0.0 - 0.2 /100 WBC   ECG 12 Lead    Collection Time: 06/04/22  5:05 PM   Result Value Ref Range    QT Interval 483 ms   Urinalysis With Microscopic If Indicated (No Culture) - Urine, Clean Catch    Collection Time: 06/04/22  6:01 PM    Specimen: Urine, Clean Catch   Result Value Ref Range    Color, UA Dark Yellow (A) Yellow, Straw    Appearance, UA Turbid (A) Clear    pH, UA 6.0 5.0 - 8.0    Specific Gravity, UA >=1.030 1.005 - 1.030    Glucose, UA Negative Negative    Ketones, UA Trace (A) Negative    Bilirubin, UA Negative Negative    Blood, UA Large (3+) (A) Negative    Protein, UA >=300 mg/dL (3+) (A) Negative    Leuk Esterase, UA Small (1+) (A) Negative    Nitrite, UA Negative Negative    Urobilinogen, UA 1.0 E.U./dL 0.2 - 1.0 E.U./dL   Blood Gas, Arterial -    Collection Time: 06/04/22  6:42 PM    Specimen: Arterial Blood   Result Value Ref Range    Site Arterial: right brachial     Lalo's Test N/A     pH, Arterial 7.503 (H) 7.350 - 7.450 pH units    pCO2, Arterial 36.3 35.0 - 45.0 mm Hg    pO2, Arterial 70.5 (L) 80.0 - 100.0 mm Hg    HCO3, Arterial 28.6 (H) 22.0 - 28.0 mmol/L    Base Excess, Arterial 5.2 (H) 0.0 - 2.0 mmol/L    O2 Saturation Calculated 95.4 92.0 - 99.0 %    Barometric Pressure for Blood Gas 751.2 mmHg    Modality Cannula     Flow Rate 2 lpm    Rate 20 Breaths/minute       Ordered the above labs and reviewed the results.        RADIOLOGY  CT Head Without Contrast   Preliminary Result       No evidence for acute traumatic intracranial pathology.       Moderate changes of inflammatory paranasal sinus disease are   incidentally noted.       Radiation dose reduction techniques were utilized, including automated   exposure control and exposure modulation based on body size.              XR Chest 1 View   Preliminary Result       Findings most compatible  with multifocal areas of pneumonia.       These findings were discussed with Dr. Kirkpatrick on 06/04/2022 at   approximately 5:55 PM.               Ordered the above noted radiological studies. Reviewed by me in PACS.          PROCEDURES  Procedures      EKG:          EKG time: 1705  Rhythm/Rate: Normal sinus rhythm 67  P waves and FL: First-degree AV block  QRS, axis: Left bundle branch block  ST and T waves: Nonspecific ST-T waves    Interpreted Contemporaneously by me, independently viewed  Unchanged compared to prior 2/27/2019        MEDICATIONS GIVEN IN ER  Medications   cefTRIAXone (ROCEPHIN) 1 g in sodium chloride 0.9 % 100 mL IVPB-VTB (has no administration in time range)   AZITHROMYCIN 500 MG/250 ML 0.9% NS IVPB (vial-mate) (has no administration in time range)   nitroglycerin (NITROSTAT) SL tablet 0.4 mg (has no administration in time range)   acetaminophen (TYLENOL) tablet 650 mg (has no administration in time range)   ondansetron (ZOFRAN) tablet 4 mg (has no administration in time range)     Or   ondansetron (ZOFRAN) injection 4 mg (has no administration in time range)   Pharmacy to Dose enoxaparin (LOVENOX) (has no administration in time range)   cefTRIAXone (ROCEPHIN) 1 g in sodium chloride 0.9 % 100 mL IVPB-VTB (has no administration in time range)   potassium chloride (K-DUR,KLOR-CON) ER tablet 40 mEq (has no administration in time range)   Enoxaparin Sodium (LOVENOX) syringe 40 mg (has no administration in time range)   potassium chloride (K-DUR,KLOR-CON) ER tablet 40 mEq (has no administration in time range)     Or   potassium chloride 10 mEq in 100 mL IVPB (has no administration in time range)             PROGRESS AND CONSULTS  ED Course as of 06/04/22 1853   Sat Jun 04, 2022   1829 18:29 EDT  Patient presents for confusion.  Patient does have what sounds like worsening dementia.  Patient also has multifocal pneumonia.  She has been coughing recently.  Has been given Rocephin and azithromycin.   Patient discussed with [employer] with A and will be admitted. [SL]      ED Course User Index  [SL] Shar Kirkpatrick MD           MEDICAL DECISION MAKING      MDM  Number of Diagnoses or Management Options     Amount and/or Complexity of Data Reviewed  Clinical lab tests: reviewed and ordered (White blood cell count 15,000.  Potassium 2.5)  Tests in the radiology section of CPT®: reviewed and ordered (Head CT negative and chest x-ray with bilateral infiltrates)  Discuss the patient with other providers: yes (Discussed with [employer] who will admit for Dr. Montenegro)               DIAGNOSIS  Final diagnoses:   Multifocal pneumonia   Altered mental status, unspecified altered mental status type   Hypokalemia           DISPOSITION  admit        Latest Documented Vital Signs:  As of 18:53 EDT  BP- 121/69 HR- 69 Temp- 98.7 °F (37.1 °C) O2 sat- 94%                     Shar Kirkpatrick MD  06/04/22 1010

## 2022-06-04 NOTE — ED TRIAGE NOTES
Pt  brings patient in for AMS x few days. Pt  thinks she is taking too many of her medications. Pt appears intoxicated, pt denies ETOH consumption today states drinks only some times. Pt son found patient on the floor. Pt is sluggish, arouses on voice. Closing eyes during traige and only answers questions occasionally. Pt  concerned she is getting dementia and states she is falling frequently. Pt oriented to self and time only.

## 2022-06-04 NOTE — H&P
Patient Name:  Sandra Kellogg  YOB: 1960  MRN:  [id number]  Admit Date:  6/4/2022  Patient Care Team:  Jh Muniz MD as PCP - General (Family Medicine)  Joe Crump MD as Consulting Physician (Rheumatology)  Miley, Robert Gibson MD as Consulting Physician (Gastroenterology)  Joe Jose MD as Consulting Physician (Orthopedic Surgery)  Neal Ford MD as Consulting Physician (Internal Medicine)      Subjective   History Present Illness     Chief Complaint   Patient presents with   • Altered Mental Status       Ms. Kellogg is a 61 y.o. female with a history of AAA, MI, anxiety, CKD stage 3, HTN, HLD, depression, syncope and chronic back pain that presents to [facility] complaining of altered mental status.  Patient was brought in by her  who states that she has become progressively altered in her mentation over the last few days.  Her  is already depression that she is overmedicating and taking too many pills.  Therefore, he has taken away her pills recently to see if that helped better the situation.  However, patient's son found patient on the floor.  Has been reports multiple falls over the past few days.  Patient was feeling ill earlier in the week.  She was having a sore throat and experiencing some difficulty swallowing.  At that time she presented to an urgent care where she was diagnosed with strep throat and placed on amoxicillin.  Patient has report decreased oral intake due to sore throat.  Patient has taken a couple bites of food today and drink a little hydration.  That was on Wednesday 06/01/2022.  Patient has been reports altered mental status beginning Thursday and progressing until presenting to [facility] emergency department today.  Patient is alert and oriented x2-3.  She is able to tell me her name and date of birth and that she is in a medical facility.  She does not understand why she is here and is not clear on  date or time.  Patient seems very uncomfortable at rest.  She is continuously moving her head back-and-forth with her eyes closed and she has rapid movement with her bilateral upper extremities.  Bilateral lower extremities were crossed and at rest.  Patient displaying decreased mentation throughout the entirety of the exam.  She denied any pain but when questioned about her chronic back pain she began explaining about her arthritic pain in her bilateral shoulders and her back.  She denies any sleep disturbances but she does have a history of insomnia and is on sleep medication.  Patient definitely had decreased attention and when she spoke she did so and a toddler type voice at times.  No acute distress was noted throughout the entirety of the exam.  She is utilizing 2 L supplemental oxygen at this time with oxygen saturation of 92 to 94%.  Her heart rate and blood pressure are within desired limits.  Plan of care reviewed with patient and has been and they are agreeable.  Review of systems, physical exam, diagnostic data and plan are as outlined below.      History of Present Illness  Review of Systems   Constitutional: Positive for activity change and appetite change. Negative for fever.   HENT: Positive for sore throat and trouble swallowing.    Respiratory: Positive for cough and shortness of breath.    Gastrointestinal: Negative for constipation, diarrhea, nausea and vomiting.   Endocrine: Positive for polyuria. Negative for cold intolerance and heat intolerance.   Genitourinary: Negative for difficulty urinating.   Musculoskeletal: Positive for myalgias. Negative for neck pain and neck stiffness.   Neurological: Positive for dizziness, weakness and light-headedness. Negative for headaches.   Psychiatric/Behavioral: Positive for agitation, behavioral problems, confusion and decreased concentration. Negative for sleep disturbance. The patient is nervous/anxious.         Personal History     Past Medical  History:   Diagnosis Date   • Abdominal aortic aneurysm (AAA) without rupture ([facility]) 9/15/2020   • Abnormal CT scan of lung 08/07/2019    DONE AT [facility]   • Achilles tendinitis of both lower extremities 10/2016   • Acquired hypothyroidism 7/21/2017   • Anxiety    • Baker's cyst    • Bilateral lower leg cellulitis 10/21/2016    SEEN AT [facility] ER   • C. difficile colitis 08/2020   • CAD (coronary artery disease)    • Chronic pain 1/24/2019   • CKD (chronic kidney disease) 2/28/2019    STAGE 3, FOLLOWED BY DR. ROBERT LR   • Colon polyps     Followed by Dr. Robert Trent   • Constipation    • Cystitis 03/25/2019   • DDD (degenerative disc disease), lumbar    • Dehydration 10/2020   • Dehydration 01/23/2019    WITH CATHERINE, ADMITTED TO [facility]   • Depression    • Duodenitis 05/2020   • Erosive gastritis 03/2019   • GERD (gastroesophageal reflux disease)    • GI (gastrointestinal bleed)    • GI hemorrhage 09/2020   • H. pylori infection    • Hyperkalemia 3/1/2019   • Hyperlipidemia     MIXED   • Hypertension    • Hypocortisolemia ([facility]) 8/23/2020   • Impingement syndrome of left shoulder 02/2019   • Impingement syndrome of right shoulder 7/23/2013   • Insomnia    • Insulin resistance    • Metabolic syndrome    • Metabolic syndrome    • Microhematuria    • Myocardial infarction ([facility]) 11/24/2005    PTCA WITH STENT X3, ADMITTED TO [facility]   • Osteoarthritis     MULTIPLE SITES   • Post-tussive emesis 08/2019   • Pulmonary nodule 08/2019   • Radial head fracture 08/08/2019    Closed nondisplaced fracture right radius   • Renal anomaly 11/29/2010    RENAL US: ASYMMETRY OF RENAL SIZE WITH THE RIGHT KIDNEY LARGER THAN THE LEFT. FETAL LOBULATION OR DROMEDARY HUMP ALONG THE ANTERIOR MID POLE OF THE LEFT KIDNEY. CORTICOL THINNING ON LEFT SIDE.   • Renal insufficiency    • Rotator cuff tendinitis, left 09/2019   • Rotator cuff tendinitis, right 12/2019   • Septic shock due to Clostridium difficile ([facility]) 08/14/2020    ADMITTED TO [facility]   •  Syncope and collapse 08/2020   • Tear of lateral meniscus of right knee 02/2016   • Trochanteric bursitis of right hip 6/4/2014   • Trochanteric bursitis, left hip 05/2019   • Urge incontinence of urine 10/2/2018   • Vitamin C deficiency 08/2020     Past Surgical History:   Procedure Laterality Date   • ANORECTAL MANOMETRY N/A 1/14/2021    Procedure: ANORECTAL MANOMETRY;  Surgeon: Rubi May MD;  Location: [facility] ENDOSCOPY;  Service: General;  Laterality: N/A;  pre-constipation  pose-same   • CARPAL TUNNEL RELEASE WITH CUBITAL TUNNEL RELEASE Right 10/03/2017    WITH 1RST [facility] TRAPEZIUM RESECTION AND ARTHROPLASTY, DR. CATIA MALCOLM AT [facility]   • CARPAL TUNNEL RELEASE WITH CUBITAL TUNNEL RELEASE Left 12/26/2017    WITH 1RST [facility] ARTHROPLASTY AND LIGAMENT RECONSTRUCTION, DR. CATIA MALCOLM AT [facility]   • COLONOSCOPY N/A 1/27/2019    5 MM TUBULAR ADENOMA POLYP IN TRANSVERSE, 5 MM TUBULAR ADENOMA POLYP IN CECUM, 4 MM HYPERPLASTIC POLYP IN SIGMOID, RANDOM COLON BX BENIGN, DR. SIL GROVES AT [facility]   • CORONARY ANGIOPLASTY WITH STENT PLACEMENT  11/2005    3 STENTS, AT [place]   • CORONARY ANGIOPLASTY WITH STENT PLACEMENT  2004    1 STENT   • ENDOSCOPY N/A 1/27/2019    CHRONIC PEPTIC DUODENITIS, MILD CHRONIC GASTRITIS, NON SEVERE REFLUX ESOPHAGITIS, DR. SIL GROVES AT [facility]   • EXPLORATORY LAPAROTOMY N/A 08/15/2020    DR. JULIANNA SINHA AT [place]   • KNEE ARTHROSCOPY W/ MENISCAL REPAIR Right 02/02/2016    PARTIAL LATERAL AND MEDIAL MENISCUS REPAIR, DR. RYLAN CHAVIS AT [place]   • LAMINECTOMY AND MICRODISCECTOMY LUMBAR SPINE N/A 1995    L4-L5   • LUMBAR EPIDURAL INJECTION N/A 02/14/2019    DR. ANDREW OSPINA AT [facility]   • LUMBAR EPIDURAL INJECTION N/A 03/28/2019    DR. ANDREW OSPINA AT [facility]   • LUMBAR EPIDURAL INJECTION N/A 11/05/2020    DR. ANDREW OSPINA AT [facility]   • TOTAL KNEE ARTHROPLASTY Left 11/01/2017    DR. RYLAN CHAVIS AT [place]   • TOTAL KNEE ARTHROPLASTY Right 05/10/2017      RYLAN CHAVIS AT [place]   • TUBAL ABDOMINAL LIGATION Bilateral    • ULNAR NERVE DECOMPRESSION Left      Family History   Problem Relation Age of Onset   • Hypertension Mother    • Diabetes Mother    • Heart disease Mother    • Lung cancer Father    • Cancer Father    • No Known Problems Sister    • No Known Problems Brother    • Breast cancer Paternal Aunt    • Cancer Paternal Aunt    • Esophageal cancer Paternal Aunt    • Cancer Paternal Aunt      Social History     Tobacco Use   • Smoking status: Former Smoker     Packs/day: 1.50     Years: 35.00     Pack years: 52.50     Types: Cigarettes     Start date:      Quit date: 2009     Years since quittin.4   • Smokeless tobacco: Never Used   Substance Use Topics   • Alcohol use: No   • Drug use: No     No current facility-administered medications on file prior to encounter.     Current Outpatient Medications on File Prior to Encounter   Medication Sig Dispense Refill   • amLODIPine (NORVASC) 10 MG tablet TAKE 1 TABLET EVERY DAY 90 tablet 1   • aspirin 81 MG tablet Take 81 mg by mouth Daily.     • buPROPion XL (WELLBUTRIN XL) 150 MG 24 hr tablet Take 1 tablet by mouth Daily. 90 tablet 1   • Cholecalciferol (Vitamin D3) 1.25 MG (90988 UT) capsule 5,000 Int'l Units.     • DULoxetine (CYMBALTA) 60 MG capsule TAKE 1 CAPSULE EVERY DAY 90 capsule 3   • famotidine (PEPCID) 20 MG tablet Take 1 tablet by mouth 2 (Two) Times a Day. 180 tablet 2   • hydrALAZINE (APRESOLINE) 25 MG tablet Take 25 mg by mouth 2 (two) times a day.     • hydrOXYzine (ATARAX) 25 MG tablet Take 1 tablet by mouth Every 6 (Six) Hours As Needed.     • isosorbide mononitrate (IMDUR) 60 MG 24 hr tablet TAKE 1 TABLET EVERY DAY 90 tablet 3   • lamoTRIgine (LaMICtal) 25 MG tablet 2 tabs po qhs 180 tablet 1   • levothyroxine (SYNTHROID, LEVOTHROID) 125 MCG tablet Take 1 tablet by mouth Daily. 90 tablet 2   • lisinopril (PRINIVIL,ZESTRIL) 40 MG tablet Take 1 tablet by mouth Daily. FINAL REFILL MUST BE  SEEN 90 tablet 0   • metoprolol succinate XL (TOPROL-XL) 50 MG 24 hr tablet TAKE 1 TABLET EVERY DAY 90 tablet 1   • nitroglycerin (NITROSTAT) 0.4 MG SL tablet Place 0.4 mg under the tongue.     • Omega-3 Fatty Acids (OMEGA-3 FISH OIL PO) Take 1 tablet by mouth Daily.     • oxybutynin (DITROPAN) 5 MG tablet TAKE 1 TABLET TWICE DAILY 180 tablet 3   • oxyCODONE-acetaminophen (PERCOCET)  MG per tablet Take 1 tablet by mouth every 4 to 6 hours if needed (max 5/day)  for 30 Days     • promethazine (PHENERGAN) 25 MG tablet Take 1 tablet by mouth Every 6 (Six) Hours As Needed for Nausea or Vomiting. 30 tablet 2   • rosuvastatin (CRESTOR) 40 MG tablet Take 40 mg by mouth Daily.     • sucralfate (CARAFATE) 1 g tablet TAKE 1 TABLET THREE TIMES DAILY WITH MEALS 270 tablet 3   • tiZANidine (ZANAFLEX) 4 MG tablet Take 4 mg by mouth 3 (Three) Times a Day.     • zolpidem (AMBIEN) 10 MG tablet Take 10 mg by mouth.       Allergies   Allergen Reactions   • Metformin Urinary Retention     Kidney failure     • Clindamycin/Lincomycin GI Intolerance       Objective    Objective     Vital Signs  Temp:  [98.7 °F (37.1 °C)] 98.7 °F (37.1 °C)  Heart Rate:  [68-76] 70  Resp:  [18-20] 20  BP: (103-121)/(64-70) 120/70  SpO2:  [88 %-95 %] 92 %  on  Flow (L/min):  [2] 2;   Device (Oxygen Therapy): nasal cannula  There is no height or weight on file to calculate BMI.    Physical Exam  Constitutional:       Appearance: She is obese. She is ill-appearing.   HENT:      Head: Normocephalic and atraumatic.      Mouth/Throat:      Pharynx: Oropharynx is clear. Posterior oropharyngeal erythema present.   Eyes:      General: No scleral icterus.     Extraocular Movements: Extraocular movements intact.      Conjunctiva/sclera: Conjunctivae normal.   Cardiovascular:      Rate and Rhythm: Normal rate and regular rhythm.      Pulses: Normal pulses.      Heart sounds: Normal heart sounds.   Pulmonary:      Effort: Pulmonary effort is normal.      Breath  sounds: Examination of the right-lower field reveals decreased breath sounds. Examination of the left-lower field reveals decreased breath sounds. Decreased breath sounds present. No wheezing.   Abdominal:      General: Bowel sounds are normal.      Palpations: Abdomen is soft.      Tenderness: There is no abdominal tenderness.   Musculoskeletal:         General: Tenderness present. No swelling. Normal range of motion.      Right lower leg: No edema.      Left lower leg: No edema.   Skin:     General: Skin is warm and dry.      Capillary Refill: Capillary refill takes less than 2 seconds.      Findings: Bruising and erythema present.   Neurological:      Mental Status: She is alert. She is disoriented.      Motor: Weakness present.   Psychiatric:         Attention and Perception: She is inattentive.         Mood and Affect: Mood is anxious.         Speech: Speech is tangential.         Behavior: Behavior is cooperative.         Cognition and Memory: Cognition is impaired. Memory is impaired.         Results Review:  I reviewed the patient's new clinical results.  I reviewed the patient's new imaging results and agree with the interpretation.  I reviewed the patient's other test results and agree with the interpretation  I personally viewed and interpreted the patient's EKG/Telemetry data  Discussed with ED provider.    Lab Results (last 24 hours)     Procedure Component Value Units Date/Time    CBC & Differential [[id number]]  (Abnormal) Collected: 06/04/22 1704    Specimen: Blood Updated: 06/04/22 1729    Narrative:      The following orders were created for panel order CBC & Differential.  Procedure                               Abnormality         Status                     ---------                               -----------         ------                     CBC Auto Differential[[id number]]        Abnormal            Final result                 Please view results for these tests on the individual orders.     Comprehensive Metabolic Panel [[id number]]  (Abnormal) Collected: 06/04/22 1704    Specimen: Blood Updated: 06/04/22 1835     Glucose 125 mg/dL      BUN 16 mg/dL      Creatinine 1.62 mg/dL      Sodium 134 mmol/L      Potassium 2.5 mmol/L      Chloride 92 mmol/L      CO2 27.5 mmol/L      Calcium 9.1 mg/dL      Total Protein 7.5 g/dL      Albumin 3.50 g/dL      ALT (SGPT) 25 U/L      AST (SGOT) 36 U/L      Alkaline Phosphatase 122 U/L      Total Bilirubin 0.8 mg/dL      Globulin 4.0 gm/dL      A/G Ratio 0.9 g/dL      BUN/Creatinine Ratio 9.9     Anion Gap 14.5 mmol/L      eGFR 36.2 mL/min/1.73      Comment: National Kidney Foundation and American Society of Nephrology (ASN) Task Force recommended calculation based on the Chronic Kidney Disease Epidemiology Collaboration (CKD-EPI) equation refit without adjustment for race.       Narrative:      GFR Normal >60  Chronic Kidney Disease <60  Kidney Failure <15      Acetaminophen Level [[id number]]  (Normal) Collected: 06/04/22 1704    Specimen: Blood Updated: 06/04/22 1904     Acetaminophen <5.0 mcg/mL     Ethanol [[id number]] Collected: 06/04/22 1704    Specimen: Blood Updated: 06/04/22 1746     Ethanol <10 mg/dL      Ethanol % <0.010 %     Salicylate Level [[id number]] Collected: 06/04/22 1704    Specimen: Blood Updated: 06/04/22 1715    CBC Auto Differential [[id number]]  (Abnormal) Collected: 06/04/22 1704    Specimen: Blood Updated: 06/04/22 1729     WBC 14.28 10*3/mm3      RBC 4.12 10*6/mm3      Hemoglobin 12.2 g/dL      Hematocrit 36.3 %      MCV 88.1 fL      MCH 29.6 pg      MCHC 33.6 g/dL      RDW 13.4 %      RDW-SD 43.0 fl      MPV 8.9 fL      Platelets 307 10*3/mm3      Neutrophil % 81.4 %      Lymphocyte % 7.7 %      Monocyte % 9.3 %      Eosinophil % 0.4 %      Basophil % 0.4 %      Immature Grans % 0.8 %      Neutrophils, Absolute 11.63 10*3/mm3      Lymphocytes, Absolute 1.10 10*3/mm3      Monocytes, Absolute 1.33 10*3/mm3      Eosinophils, Absolute 0.06  10*3/mm3      Basophils, Absolute 0.05 10*3/mm3      Immature Grans, Absolute 0.11 10*3/mm3      nRBC 0.1 /100 WBC     Procalcitonin [[id number]] Collected: 06/04/22 1704    Specimen: Blood Updated: 06/04/22 1951    Urinalysis With Microscopic If Indicated (No Culture) - Urine, Clean Catch [[id number]]  (Abnormal) Collected: 06/04/22 1801    Specimen: Urine, Clean Catch Updated: 06/04/22 1851     Color, UA Dark Yellow     Appearance, UA Turbid     pH, UA 6.0     Specific Gravity, UA >=1.030     Glucose, UA Negative     Ketones, UA Trace     Bilirubin, UA Negative     Blood, UA Large (3+)     Protein, UA >=300 mg/dL (3+)     Leuk Esterase, UA Small (1+)     Nitrite, UA Negative     Urobilinogen, UA 1.0 E.U./dL    Urine Drug Screen - Urine, Clean Catch [[id number]]  (Abnormal) Collected: 06/04/22 1801    Specimen: Urine, Clean Catch Updated: 06/04/22 1929     Amphet/Methamphet, Screen Positive     Barbiturates Screen, Urine Negative     Benzodiazepine Screen, Urine Negative     Cocaine Screen, Urine Negative     Opiate Screen Negative     THC, Screen, Urine Positive     Methadone Screen, Urine Negative     Oxycodone Screen, Urine Positive    Narrative:      Negative Thresholds Per Drugs Screened:    Amphetamines                 500 ng/ml  Barbiturates                 200 ng/ml  Benzodiazepines              100 ng/ml  Cocaine                      300 ng/ml  Methadone                    300 ng/ml  Opiates                      300 ng/ml  Oxycodone                    100 ng/ml  THC                           50 ng/ml    The Normal Value for all drugs tested is negative. This report includes final unconfirmed screening results to be used for medical treatment purposes only. Unconfirmed results must not be used for non-medical purposes such as employment or legal testing. Clinical consideration should be applied to any drug of abuse test, particularly when unconfirmed results are used.            Urinalysis, Microscopic Only -  Urine, Clean Catch [[id number]]  (Abnormal) Collected: 06/04/22 1801    Specimen: Urine, Clean Catch Updated: 06/04/22 1912     RBC, UA 3-5 /HPF      WBC, UA 6-12 /HPF      Bacteria, UA 4+ /HPF      Squamous Epithelial Cells, UA 3-6 /HPF      Hyaline Casts, UA 7-12 /LPF      Methodology Manual Light Microscopy    Respiratory Panel PCR w/COVID-19(SARS-CoV-2) TYSON/DAYSI/ELAINE/PAD/COR/MAD/MICHAEL In-House, NP Swab in UTM/VTM, 3-4 HR TAT - Swab, Nasopharynx [[id number]]  (Normal) Collected: 06/04/22 1813    Specimen: Swab from Nasopharynx Updated: 06/04/22 1927     ADENOVIRUS, PCR Not Detected     Coronavirus 229E Not Detected     Coronavirus HKU1 Not Detected     Coronavirus NL63 Not Detected     Coronavirus OC43 Not Detected     COVID19 Not Detected     Human Metapneumovirus Not Detected     Human Rhinovirus/Enterovirus Not Detected     Influenza A PCR Not Detected     Influenza B PCR Not Detected     Parainfluenza Virus 1 Not Detected     Parainfluenza Virus 2 Not Detected     Parainfluenza Virus 3 Not Detected     Parainfluenza Virus 4 Not Detected     RSV, PCR Not Detected     Bordetella pertussis pcr Not Detected     Bordetella parapertussis PCR Not Detected     Chlamydophila pneumoniae PCR Not Detected     Mycoplasma pneumo by PCR Not Detected    Narrative:      In the setting of a positive respiratory panel with a viral infection PLUS a negative procalcitonin without other underlying concern for bacterial infection, consider observing off antibiotics or discontinuation of antibiotics and continue supportive care. If the respiratory panel is positive for atypical bacterial infection (Bordetella pertussis, Chlamydophila pneumoniae, or Mycoplasma pneumoniae), consider antibiotic de-escalation to target atypical bacterial infection.    Blood Culture - Blood, Arm, Right [[id number]] Collected: 06/04/22 1818    Specimen: Blood from Arm, Right Updated: 06/04/22 1833    Lactic Acid, Plasma [[id number]]  (Normal) Collected: 06/04/22  1818    Specimen: Blood Updated: 06/04/22 1917     Lactate 1.3 mmol/L     Blood Culture - Blood, Arm, Left [[id number]] Collected: 06/04/22 1840    Specimen: Blood from Arm, Left Updated: 06/04/22 1901    Blood Gas, Arterial - [[id number]]  (Abnormal) Collected: 06/04/22 1842    Specimen: Arterial Blood Updated: 06/04/22 1848     Site Arterial: right brachial     Lalo's Test N/A     pH, Arterial 7.503 pH units      pCO2, Arterial 36.3 mm Hg      pO2, Arterial 70.5 mm Hg      HCO3, Arterial 28.6 mmol/L      Base Excess, Arterial 5.2 mmol/L      O2 Saturation Calculated 95.4 %      Barometric Pressure for Blood Gas 751.2 mmHg      Comment: Meter: [id number] : [id number] Lilly Coughlin        Modality Cannula     Flow Rate 2 lpm      Rate 20 Breaths/minute           Imaging Results (Last 24 Hours)     Procedure Component Value Units Date/Time    XR Chest 1 View [[id number]] Collected: 06/04/22 1758     Updated: 06/04/22 1758    Narrative:      PORTABLE RADIOGRAPHIC VIEW OF THE CHEST      CLINICAL HISTORY: Weakness.     FINDINGS: Stat portable radiographic view of the chest demonstrates  multifocal areas of infiltrate most suggestive of multifocal pneumonia.  These abnormalities are seen within the left base, as well as the right  mid and upper lung zones. Overall, lung volumes are relatively low. The  cardiomediastinal silhouette is prominent in size, likely accentuated by  the low lung volumes. The osseous structures are unremarkable.       Impression:         Findings most compatible with multifocal areas of pneumonia.     These findings were discussed with Dr. Kirkpatrick on 06/04/2022 at  approximately 5:55 PM.       CT Head Without Contrast [[id number]] Collected: 06/04/22 1754     Updated: 06/04/22 1754    Narrative:      CT HEAD WITHOUT CONTRAST     CLINICAL HISTORY: Altered mental status. Patient has fallen twice  yesterday and once today.     TECHNIQUE: CT scan of the head was obtained with 3 mm axial soft  tissue  algorithm and 2 mm bone algorithm images. No intravenous contrast was  administered. Sagittal and coronal reconstructions were obtained.     COMPARISON: No previous similar studies are available for comparison.     FINDINGS:       There is no evidence for a calvarial fracture. There is no evidence for  an acute extra-axial hemorrhage.     The ventricles, sulci, and cisterns are age appropriate. The basal  ganglia and thalami are unremarkable in appearance. The posterior fossa  structures are within normal limits.     There is near complete opacification of the right maxillary sinus.  Mucosal thickening is identified within the frontal sinus and the  ethmoid air cells.       Impression:         No evidence for acute traumatic intracranial pathology.     Moderate changes of inflammatory paranasal sinus disease are  incidentally noted.     Radiation dose reduction techniques were utilized, including automated  exposure control and exposure modulation based on body size.                  ECG 12 Lead   Preliminary Result   HEART RATE= 67  bpm   RR Interval= 896  ms   OK Interval= 207  ms   P Horizontal Axis= 7  deg   P Front Axis= 40  deg   QRSD Interval= 137  ms   QT Interval= 483  ms   QRS Axis= -4  deg   T Wave Axis= 1  deg   - ABNORMAL ECG -   Sinus rhythm   Borderline prolonged OK interval   Left bundle branch block   Electronically Signed By:    Date and Time of Study: 2022-06-04 17:05:23        Assessment/Plan   Assessment & Plan   Active Hospital Problems    Diagnosis  POA   • **Multifocal pneumonia [J18.9]  Yes   • Metabolic encephalopathy [G93.41]  Yes   • Hypokalemia [E87.6]  Yes   • Chronic back pain [M54.9, G89.29]  Yes   • CKD (chronic kidney disease) [N18.9]  Yes   • Essential hypertension [I10]  Yes      Resolved Hospital Problems   No resolved problems to display.       61 y.o. female admitted with Multifocal pneumonia.    Pneumonia  · Continuous pulse oximetry monitoring  · Supplemental oxygen  as needed.  Patient currently utilizing 2 L supplemental oxygen via nasal cannula  · Continue Rocephin IV.  First dose administered in the emergency department  · Incentive spirometry every 4 hours while awake  · Blood cultures pending  · Respiratory viral panel reviewed and is negative  · CBC in a.m.  Patient's white blood cell count on initial lab work completed in the emergency department was 14.28.  We will continue to monitor this throughout hospitalization  · Trend labs    Metabolic encephalopathy  · Neuro status checks with nursing assessments  · We will be judicious with medications for now.  Patient does have a history of insomnia, anxiety, depression and back pain and is prescribed Ambien, Zanaflex, Wellbutrin, Atarax, Cymbalta and oxycodone.  · Medication adjustments based on patient tolerance and outcomes throughout course of hospitalization    Hypokalemia and hyponatremia  · Potassium replacement.  Due to patient's CKD will adjust potassium replacement based on patient's labs and not initiate potassium replacement protocol  · Serum sodium slightly below expected level.  We will monitor for now  · BMP in a.m.  · Continuous cardiac monitoring    CKD  · At baseline.  We will continue to monitor with daily labs    · Lovenox 40 mg SC daily for DVT prophylaxis.  · Full code.  · Discussed with patient, family, ED provider and Dr. Montenegro.      LAVONNE Quintero  [facility]  06/04/22  20:39 EDT
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
## 2022-06-05 PROBLEM — E87.1 HYPONATREMIA: Status: ACTIVE | Noted: 2022-06-05

## 2022-06-05 PROBLEM — R53.1 WEAKNESS: Status: ACTIVE | Noted: 2022-06-05

## 2022-06-05 PROBLEM — R79.89 ELEVATED LIVER FUNCTION TESTS: Status: ACTIVE | Noted: 2022-06-05

## 2022-06-05 PROBLEM — D63.8 ANEMIA, CHRONIC DISEASE: Status: ACTIVE | Noted: 2022-06-05

## 2022-06-05 PROBLEM — R29.6 FALLS: Status: ACTIVE | Noted: 2022-06-05

## 2022-06-05 PROBLEM — E86.0 DEHYDRATION: Status: ACTIVE | Noted: 2022-06-05

## 2022-06-05 PROBLEM — E78.5 HYPERLIPIDEMIA: Status: ACTIVE | Noted: 2022-06-05

## 2022-06-05 LAB
ANION GAP SERPL CALCULATED.3IONS-SCNC: 13 MMOL/L (ref 5–15)
BACTERIA UR QL AUTO: ABNORMAL /HPF
BILIRUB UR QL STRIP: NEGATIVE
BUN SERPL-MCNC: 15 MG/DL (ref 8–23)
BUN/CREAT SERPL: 10.7 (ref 7–25)
CALCIUM SPEC-SCNC: 8.8 MG/DL (ref 8.6–10.5)
CHLORIDE SERPL-SCNC: 101 MMOL/L (ref 98–107)
CK SERPL-CCNC: 564 U/L (ref 20–180)
CLARITY UR: CLEAR
CO2 SERPL-SCNC: 23 MMOL/L (ref 22–29)
COLOR UR: YELLOW
CREAT SERPL-MCNC: 1.4 MG/DL (ref 0.57–1)
DEPRECATED RDW RBC AUTO: 40.2 FL (ref 37–54)
EGFRCR SERPLBLD CKD-EPI 2021: 43.2 ML/MIN/1.73
ERYTHROCYTE [DISTWIDTH] IN BLOOD BY AUTOMATED COUNT: 13.1 % (ref 12.3–15.4)
GLUCOSE SERPL-MCNC: 95 MG/DL (ref 65–99)
GLUCOSE UR STRIP-MCNC: NEGATIVE MG/DL
HCT VFR BLD AUTO: 31.9 % (ref 34–46.6)
HGB BLD-MCNC: 11 G/DL (ref 12–15.9)
HGB UR QL STRIP.AUTO: ABNORMAL
HYALINE CASTS UR QL AUTO: ABNORMAL /LPF
KETONES UR QL STRIP: NEGATIVE
LEUKOCYTE ESTERASE UR QL STRIP.AUTO: NEGATIVE
MAGNESIUM SERPL-MCNC: 2.7 MG/DL (ref 1.6–2.4)
MCH RBC QN AUTO: 29.3 PG (ref 26.6–33)
MCHC RBC AUTO-ENTMCNC: 34.5 G/DL (ref 31.5–35.7)
MCV RBC AUTO: 85.1 FL (ref 79–97)
NITRITE UR QL STRIP: NEGATIVE
PH UR STRIP.AUTO: 6 [PH] (ref 5–8)
PLATELET # BLD AUTO: 309 10*3/MM3 (ref 140–450)
PMV BLD AUTO: 8.6 FL (ref 6–12)
POTASSIUM SERPL-SCNC: 3.3 MMOL/L (ref 3.5–5.2)
POTASSIUM SERPL-SCNC: 3.6 MMOL/L (ref 3.5–5.2)
PROT UR QL STRIP: ABNORMAL
RBC # BLD AUTO: 3.75 10*6/MM3 (ref 3.77–5.28)
RBC # UR STRIP: ABNORMAL /HPF
REF LAB TEST METHOD: ABNORMAL
SODIUM SERPL-SCNC: 137 MMOL/L (ref 136–145)
SP GR UR STRIP: 1.01 (ref 1–1.03)
SQUAMOUS #/AREA URNS HPF: ABNORMAL /HPF
TSH SERPL DL<=0.05 MIU/L-ACNC: 0.33 UIU/ML (ref 0.27–4.2)
UROBILINOGEN UR QL STRIP: ABNORMAL
WBC # UR STRIP: ABNORMAL /HPF
WBC NRBC COR # BLD: 10.77 10*3/MM3 (ref 3.4–10.8)

## 2022-06-05 PROCEDURE — 85027 COMPLETE CBC AUTOMATED: CPT

## 2022-06-05 PROCEDURE — 84443 ASSAY THYROID STIM HORMONE: CPT | Performed by: INTERNAL MEDICINE

## 2022-06-05 PROCEDURE — 80048 BASIC METABOLIC PNL TOTAL CA: CPT

## 2022-06-05 PROCEDURE — 81001 URINALYSIS AUTO W/SCOPE: CPT | Performed by: INTERNAL MEDICINE

## 2022-06-05 PROCEDURE — 83735 ASSAY OF MAGNESIUM: CPT | Performed by: INTERNAL MEDICINE

## 2022-06-05 PROCEDURE — 25010000002 CEFTRIAXONE PER 250 MG

## 2022-06-05 PROCEDURE — 96361 HYDRATE IV INFUSION ADD-ON: CPT

## 2022-06-05 PROCEDURE — 87449 NOS EACH ORGANISM AG IA: CPT | Performed by: INTERNAL MEDICINE

## 2022-06-05 PROCEDURE — 87899 AGENT NOS ASSAY W/OPTIC: CPT | Performed by: INTERNAL MEDICINE

## 2022-06-05 PROCEDURE — 25010000002 SODIUM CHLORIDE 0.9 % WITH KCL 20 MEQ 20-0.9 MEQ/L-% SOLUTION: Performed by: INTERNAL MEDICINE

## 2022-06-05 PROCEDURE — G0378 HOSPITAL OBSERVATION PER HR: HCPCS

## 2022-06-05 PROCEDURE — 36415 COLL VENOUS BLD VENIPUNCTURE: CPT

## 2022-06-05 PROCEDURE — 25010000002 AZITHROMYCIN PER 500 MG: Performed by: INTERNAL MEDICINE

## 2022-06-05 PROCEDURE — 82550 ASSAY OF CK (CPK): CPT | Performed by: INTERNAL MEDICINE

## 2022-06-05 PROCEDURE — 25010000002 ENOXAPARIN PER 10 MG

## 2022-06-05 PROCEDURE — 84132 ASSAY OF SERUM POTASSIUM: CPT | Performed by: HOSPITALIST

## 2022-06-05 PROCEDURE — 96372 THER/PROPH/DIAG INJ SC/IM: CPT

## 2022-06-05 PROCEDURE — 90791 PSYCH DIAGNOSTIC EVALUATION: CPT

## 2022-06-05 RX ORDER — POTASSIUM CHLORIDE 750 MG/1
40 TABLET, FILM COATED, EXTENDED RELEASE ORAL AS NEEDED
Status: DISCONTINUED | OUTPATIENT
Start: 2022-06-05 | End: 2022-06-07 | Stop reason: HOSPADM

## 2022-06-05 RX ORDER — HYDROXYZINE HYDROCHLORIDE 25 MG/1
25 TABLET, FILM COATED ORAL EVERY 6 HOURS PRN
Status: DISCONTINUED | OUTPATIENT
Start: 2022-06-05 | End: 2022-06-07 | Stop reason: HOSPADM

## 2022-06-05 RX ORDER — HYDRALAZINE HYDROCHLORIDE 25 MG/1
25 TABLET, FILM COATED ORAL EVERY 12 HOURS SCHEDULED
Status: DISCONTINUED | OUTPATIENT
Start: 2022-06-05 | End: 2022-06-07 | Stop reason: HOSPADM

## 2022-06-05 RX ORDER — GUAIFENESIN 600 MG/1
600 TABLET, EXTENDED RELEASE ORAL EVERY 12 HOURS SCHEDULED
Status: DISCONTINUED | OUTPATIENT
Start: 2022-06-05 | End: 2022-06-07 | Stop reason: HOSPADM

## 2022-06-05 RX ORDER — ASPIRIN 81 MG/1
81 TABLET ORAL DAILY
Status: DISCONTINUED | OUTPATIENT
Start: 2022-06-05 | End: 2022-06-07 | Stop reason: HOSPADM

## 2022-06-05 RX ORDER — ISOSORBIDE MONONITRATE 60 MG/1
60 TABLET, EXTENDED RELEASE ORAL DAILY
Status: DISCONTINUED | OUTPATIENT
Start: 2022-06-05 | End: 2022-06-07 | Stop reason: HOSPADM

## 2022-06-05 RX ORDER — OXYCODONE HYDROCHLORIDE AND ACETAMINOPHEN 5; 325 MG/1; MG/1
1 TABLET ORAL EVERY 6 HOURS PRN
Status: DISCONTINUED | OUTPATIENT
Start: 2022-06-05 | End: 2022-06-07 | Stop reason: HOSPADM

## 2022-06-05 RX ORDER — ROSUVASTATIN CALCIUM 40 MG/1
40 TABLET, COATED ORAL DAILY
Status: DISCONTINUED | OUTPATIENT
Start: 2022-06-05 | End: 2022-06-05

## 2022-06-05 RX ORDER — MAGNESIUM SULFATE HEPTAHYDRATE 40 MG/ML
2 INJECTION, SOLUTION INTRAVENOUS AS NEEDED
Status: DISCONTINUED | OUTPATIENT
Start: 2022-06-05 | End: 2022-06-07 | Stop reason: HOSPADM

## 2022-06-05 RX ORDER — SUCRALFATE 1 G/1
1 TABLET ORAL
Status: DISCONTINUED | OUTPATIENT
Start: 2022-06-05 | End: 2022-06-07 | Stop reason: HOSPADM

## 2022-06-05 RX ORDER — LAMOTRIGINE 25 MG/1
25 TABLET ORAL NIGHTLY
Status: DISCONTINUED | OUTPATIENT
Start: 2022-06-05 | End: 2022-06-07 | Stop reason: HOSPADM

## 2022-06-05 RX ORDER — NYSTATIN 100000 [USP'U]/G
POWDER TOPICAL EVERY 12 HOURS SCHEDULED
Status: DISCONTINUED | OUTPATIENT
Start: 2022-06-05 | End: 2022-06-07 | Stop reason: HOSPADM

## 2022-06-05 RX ORDER — AMLODIPINE BESYLATE 10 MG/1
10 TABLET ORAL DAILY
Status: DISCONTINUED | OUTPATIENT
Start: 2022-06-05 | End: 2022-06-05

## 2022-06-05 RX ORDER — CHOLECALCIFEROL (VITAMIN D3) 125 MCG
5 CAPSULE ORAL NIGHTLY
Status: DISCONTINUED | OUTPATIENT
Start: 2022-06-05 | End: 2022-06-07 | Stop reason: HOSPADM

## 2022-06-05 RX ORDER — SODIUM CHLORIDE AND POTASSIUM CHLORIDE 150; 900 MG/100ML; MG/100ML
100 INJECTION, SOLUTION INTRAVENOUS CONTINUOUS
Status: DISCONTINUED | OUTPATIENT
Start: 2022-06-05 | End: 2022-06-07 | Stop reason: HOSPADM

## 2022-06-05 RX ORDER — METOPROLOL SUCCINATE 50 MG/1
50 TABLET, EXTENDED RELEASE ORAL EVERY 12 HOURS SCHEDULED
Status: DISCONTINUED | OUTPATIENT
Start: 2022-06-05 | End: 2022-06-07 | Stop reason: HOSPADM

## 2022-06-05 RX ORDER — MAGNESIUM SULFATE HEPTAHYDRATE 40 MG/ML
4 INJECTION, SOLUTION INTRAVENOUS AS NEEDED
Status: DISCONTINUED | OUTPATIENT
Start: 2022-06-05 | End: 2022-06-07 | Stop reason: HOSPADM

## 2022-06-05 RX ORDER — TIZANIDINE 4 MG/1
4 TABLET ORAL EVERY 8 HOURS PRN
Status: DISCONTINUED | OUTPATIENT
Start: 2022-06-05 | End: 2022-06-07 | Stop reason: HOSPADM

## 2022-06-05 RX ORDER — POTASSIUM CHLORIDE 750 MG/1
40 TABLET, FILM COATED, EXTENDED RELEASE ORAL ONCE
Status: COMPLETED | OUTPATIENT
Start: 2022-06-05 | End: 2022-06-05

## 2022-06-05 RX ORDER — FAMOTIDINE 20 MG/1
20 TABLET, FILM COATED ORAL 2 TIMES DAILY
Status: DISCONTINUED | OUTPATIENT
Start: 2022-06-05 | End: 2022-06-07 | Stop reason: HOSPADM

## 2022-06-05 RX ORDER — POTASSIUM CHLORIDE 1.5 G/1.77G
40 POWDER, FOR SOLUTION ORAL AS NEEDED
Status: DISCONTINUED | OUTPATIENT
Start: 2022-06-05 | End: 2022-06-07 | Stop reason: HOSPADM

## 2022-06-05 RX ORDER — LEVOTHYROXINE SODIUM 0.12 MG/1
125 TABLET ORAL DAILY
Status: DISCONTINUED | OUTPATIENT
Start: 2022-06-05 | End: 2022-06-07 | Stop reason: HOSPADM

## 2022-06-05 RX ADMIN — METOPROLOL SUCCINATE 50 MG: 50 TABLET, EXTENDED RELEASE ORAL at 20:35

## 2022-06-05 RX ADMIN — FAMOTIDINE 20 MG: 20 TABLET ORAL at 20:34

## 2022-06-05 RX ADMIN — GUAIFENESIN 600 MG: 600 TABLET, EXTENDED RELEASE ORAL at 20:35

## 2022-06-05 RX ADMIN — Medication 5 MG: at 20:35

## 2022-06-05 RX ADMIN — POTASSIUM CHLORIDE 40 MEQ: 750 TABLET, EXTENDED RELEASE ORAL at 01:47

## 2022-06-05 RX ADMIN — POTASSIUM CHLORIDE 40 MEQ: 750 TABLET, EXTENDED RELEASE ORAL at 03:16

## 2022-06-05 RX ADMIN — SODIUM CHLORIDE AND POTASSIUM CHLORIDE 100 ML/HR: .9; .15 SOLUTION INTRAVENOUS at 13:26

## 2022-06-05 RX ADMIN — POTASSIUM CHLORIDE 40 MEQ: 750 TABLET, EXTENDED RELEASE ORAL at 00:30

## 2022-06-05 RX ADMIN — FAMOTIDINE 20 MG: 20 TABLET ORAL at 13:25

## 2022-06-05 RX ADMIN — LEVOTHYROXINE SODIUM 125 MCG: 0.12 TABLET ORAL at 15:57

## 2022-06-05 RX ADMIN — LAMOTRIGINE 25 MG: 25 TABLET ORAL at 20:35

## 2022-06-05 RX ADMIN — HYDRALAZINE HYDROCHLORIDE 25 MG: 25 TABLET, FILM COATED ORAL at 13:25

## 2022-06-05 RX ADMIN — SODIUM CHLORIDE AND POTASSIUM CHLORIDE 100 ML/HR: .9; .15 SOLUTION INTRAVENOUS at 21:58

## 2022-06-05 RX ADMIN — ENOXAPARIN SODIUM 40 MG: 100 INJECTION SUBCUTANEOUS at 20:35

## 2022-06-05 RX ADMIN — ISOSORBIDE MONONITRATE 60 MG: 60 TABLET ORAL at 13:25

## 2022-06-05 RX ADMIN — CEFTRIAXONE 1 G: 1 INJECTION, POWDER, FOR SOLUTION INTRAMUSCULAR; INTRAVENOUS at 18:54

## 2022-06-05 RX ADMIN — ASPIRIN 81 MG: 81 TABLET, COATED ORAL at 13:25

## 2022-06-05 RX ADMIN — HYDRALAZINE HYDROCHLORIDE 25 MG: 25 TABLET, FILM COATED ORAL at 20:35

## 2022-06-05 RX ADMIN — AZITHROMYCIN MONOHYDRATE 500 MG: 500 INJECTION, POWDER, LYOPHILIZED, FOR SOLUTION INTRAVENOUS at 14:56

## 2022-06-05 RX ADMIN — GUAIFENESIN 600 MG: 600 TABLET, EXTENDED RELEASE ORAL at 13:25

## 2022-06-05 RX ADMIN — NYSTATIN: 100000 POWDER TOPICAL at 20:35

## 2022-06-05 RX ADMIN — SUCRALFATE 1 G: 1 TABLET ORAL at 18:54

## 2022-06-05 RX ADMIN — METOPROLOL SUCCINATE 50 MG: 50 TABLET, EXTENDED RELEASE ORAL at 13:25

## 2022-06-05 RX ADMIN — OXYCODONE AND ACETAMINOPHEN 1 TABLET: 5; 325 TABLET ORAL at 15:56

## 2022-06-05 NOTE — CONSULTS
" Patient is a 61 year old white female evaluated by [facility] due to possible chemical dependency. Patient was brought to ER 6/4 due to AMS.  recently took away her pain pills due to believing her to be overmedicated. Upon entering room, patient is resting in bed. She makes little eye contact, is guarded, and speaks in a child's tone. Patient denies IP for chemical dependency or mental health. She has little psychiatric history, stating sometimes she has dealt with \"situational depression, not clinical.\" UDS positive for THC, amphetamines and oxycodone. Patient states she has not used marijuana since high-school and has no idea why it is positive for amphetamines. Patient currently taking Lamictal and melatonin. She denies dependency to oxycodone, \"I don't like feeling weird or a doped up feeling.\" When asked if she believes taking oxycodone could have caused confusion leading to hospitalization, she states \"maybe, I was taking it cause my throat hurt. I was taking more than I usually do but every joint in my body hurt.\" Again patient adamantly denies dependency. She denies depression, voices some anxiety because \"I lost a few days and I don't know what happened, just a little anxiety.\" Patient states anxiety is fluid, most recent trigger is the selling of her home last year and purchasing RV, which is now parked in [place] due to gas prices. She is residing with her adult son in his home with her significant other, (which is her ex- but they are reconciling). Her appetite is \"coming back,\" her sleep-poor, \"I'm a bad sleeper.\" Has experienced visual hallucinations but only when taking Ambien with flexeril, \"I would see ribbons and bows.\" None currently. Denies AH/SI/HI.   Patient has one adult son and daughter. She lost a son at 17, \"when people decide to do drugs, what do you do?\" Supportive therapy provided. 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 " She enjoys traveling and casinos. Family history of dementia, (mother). Due to patient declining resources and denying chemical dependency, [facility] will sign off. Please re-consult should future needs arise.

## 2022-06-05 NOTE — ED NOTES
Pt and spouse updated on status. Pt still altered, asked what city we were in, continues to pull off her mask, her O2 and cuff. Spouse at bedside helping to reorient pt.

## 2022-06-05 NOTE — PROGRESS NOTES
Name: Sandra Kellogg ADMIT: 2022   : 1960  PCP: Jh Muniz MD    MRN: [id number] LOS: 1 days   AGE/SEX: 61 y.o. female  ROOM: [facility]     Subjective   Subjective   Patient feels better today.  She reports decreased weakness and more alertness.  No headache.  No dizziness.  No loss of consciousness.  No focal neurological symptoms.  Positive occasional dry cough.  No shortness of breath.  No wheeze.  No hemoptysis.  No chest pain.  No fever or chills.    Review of Systems  GI.  No abdominal pain or nausea or vomiting.  Normal bowel movement without constipation/diarrhea/bleeding per rectum/melena.  .  Positive frequency and urgency.  No hematuria or dysuria.       Objective   Objective   Vital Signs  Temp:  [97.5 °F (36.4 °C)-98.7 °F (37.1 °C)] 97.5 °F (36.4 °C)  Heart Rate:  [68-78] 78  Resp:  [16-24] 24  BP: ()/(47-70) 138/70  SpO2:  [88 %-96 %] 93 %  on  Flow (L/min):  [2] 2;   Device (Oxygen Therapy): room air    Intake/Output Summary (Last 24 hours) at 2022 1133  Last data filed at 2022 0923  Gross per 24 hour   Intake 340 ml   Output --   Net 340 ml     Body mass index is 39.62 kg/m².      22  2217 22  0516   Weight: 108 kg (238 lb 1.6 oz) 108 kg (238 lb 1.6 oz)     Physical Exam  General.  Middle-aged female.  Obese.  Alert and oriented x3.  In no apparent pain/distress/diaphoresis.  Normal mood and affect.  Eyes.  Pupils equal round and reactive.  Intact extraocular musculature.  No pallor or jaundice.  Normal conjunctivae and lids.  Oral cavity.  Mildly dry mucous membrane with no lesions.    Neck.  Supple.  No JVD.  No lymphadenopathy or thyromegaly.  Cardiovascular.  Regular rate and rhythm and grade 2 systolic murmur.  Chest.  Poor but clear to auscultation bilaterally with no added sounds.  Abdomen.  Soft lax.  No tenderness.  No organomegaly.  No guarding or rebound.  Extremities.  No clubbing cyanosis or edema.  CNS.  No acute focal neurological  deficits.    Results Review:      Results from last 7 days   Lab Units 06/05/22  1013 06/05/22  0513 06/04/22  1704   SODIUM mmol/L  --  137 134*   POTASSIUM mmol/L 3.6 3.3* 2.5*   CHLORIDE mmol/L  --  101 92*   CO2 mmol/L  --  23.0 27.5   BUN mg/dL  --  15 16   CREATININE mg/dL  --  1.40* 1.62*   GLUCOSE mg/dL  --  95 125*   CALCIUM mg/dL  --  8.8 9.1   AST (SGOT) U/L  --   --  36*   ALT (SGPT) U/L  --   --  25     Estimated Creatinine Clearance: 52.2 mL/min (A) (by C-G formula based on SCr of 1.4 mg/dL (H)).                            Invalid input(s):  PHOS        Invalid input(s): LDLCALC  Results from last 7 days   Lab Units 06/05/22  0513 06/04/22  1704   WBC 10*3/mm3 10.77 14.28*   HEMOGLOBIN g/dL 11.0* 12.2   HEMATOCRIT % 31.9* 36.3   PLATELETS 10*3/mm3 309 307   MCV fL 85.1 88.1   MCH pg 29.3 29.6   MCHC g/dL 34.5 33.6   RDW % 13.1 13.4   RDW-SD fl 40.2 43.0   MPV fL 8.6 8.9   NEUTROPHIL % %  --  81.4*   LYMPHOCYTE % %  --  7.7*   MONOCYTES % %  --  9.3   EOSINOPHIL % %  --  0.4   BASOPHIL % %  --  0.4   IMM GRAN % %  --  0.8*   NEUTROS ABS 10*3/mm3  --  11.63*   LYMPHS ABS 10*3/mm3  --  1.10   MONOS ABS 10*3/mm3  --  1.33*   EOS ABS 10*3/mm3  --  0.06   BASOS ABS 10*3/mm3  --  0.05   IMMATURE GRANS (ABS) 10*3/mm3  --  0.11*   NRBC /100 WBC  --  0.1         Results from last 7 days   Lab Units 06/04/22  1842   PH, ARTERIAL pH units 7.503*   PO2 ART mm Hg 70.5*   PCO2, ARTERIAL mm Hg 36.3   HCO3 ART mmol/L 28.6*     Results from last 7 days   Lab Units 06/04/22  1818 06/04/22  1704   PROCALCITONIN ng/mL  --  0.09   LACTATE mmol/L 1.3  --                  Results from last 7 days   Lab Units 06/04/22  1813   ADENOVIRUS DETECTION BY PCR  Not Detected   CORONAVIRUS 229E  Not Detected   CORONAVIRUS HKU1  Not Detected   CORONAVIRUS NL63  Not Detected   CORONAVIRUS OC43  Not Detected   HUMAN METAPNEUMOVIRUS  Not Detected   HUMAN RHINOVIRUS/ENTEROVIRUS  Not Detected   INFLUENZA B PCR  Not Detected   PARAINFLUENZA 1   Not Detected   PARAINFLUENZA VIRUS 2  Not Detected   PARAINFLUENZA VIRUS 3  Not Detected   PARAINFLUENZA VIRUS 4  Not Detected   BORDETELLA PERTUSSIS PCR  Not Detected   CHLAMYDOPHILA PNEUMONIAE PCR  Not Detected   MYCOPLAMA PNEUMO PCR  Not Detected   INFLUENZA A PCR  Not Detected   RSV, PCR  Not Detected     Results from last 7 days   Lab Units 06/04/22  1801   NITRITE UA  Negative   WBC UA /HPF 6-12*   BACTERIA UA /HPF 4+*   SQUAM EPITHEL UA /HPF 3-6*           Imaging:  Imaging Results (Last 24 Hours)     Procedure Component Value Units Date/Time    CT Head Without Contrast [[id number]] Collected: 06/04/22 1754     Updated: 06/04/22 2139    Narrative:      CT HEAD WITHOUT CONTRAST     CLINICAL HISTORY: Altered mental status. Patient has fallen twice  yesterday and once today.     TECHNIQUE: CT scan of the head was obtained with 3 mm axial soft tissue  algorithm and 2 mm bone algorithm images. No intravenous contrast was  administered. Sagittal and coronal reconstructions were obtained.     COMPARISON: No previous similar studies are available for comparison.     FINDINGS:       There is no evidence for a calvarial fracture. There is no evidence for  an acute extra-axial hemorrhage.     The ventricles, sulci, and cisterns are age appropriate. The basal  ganglia and thalami are unremarkable in appearance. The posterior fossa  structures are within normal limits.     There is near complete opacification of the right maxillary sinus.  Mucosal thickening is identified within the frontal sinus and the  ethmoid air cells.       Impression:         No evidence for acute traumatic intracranial pathology.     Moderate changes of inflammatory paranasal sinus disease are  incidentally noted.        Radiation dose reduction techniques were utilized, including automated  exposure control and exposure modulation based on body size.     This report was finalized on 6/4/2022 9:36 PM by Dr. Julio Love M.D.       XR Chest 1 View  [[id number]] Collected: 06/04/22 1758     Updated: 06/04/22 2128    Narrative:      PORTABLE RADIOGRAPHIC VIEW OF THE CHEST      CLINICAL HISTORY: Weakness.     FINDINGS:      Stat portable radiographic view of the chest demonstrates multifocal  areas of infiltrate most suggestive of multifocal pneumonia. These  abnormalities are seen within the left base, as well as the right mid  and upper lung zones. Overall, lung volumes are relatively low. The  cardiomediastinal silhouette is prominent in size, likely accentuated by  the low lung volumes. The osseous structures are unremarkable.       Impression:         Findings most compatible with multifocal areas of pneumonia.     These findings were discussed with Dr. Kirkpatrick on 06/04/2022 at  approximately 5:55 PM.     This report was finalized on 6/4/2022 9:25 PM by Dr. Julio Love M.D.                I reviewed the patient's new clinical results / labs / tests / procedures      Assessment/Plan     Active Hospital Problems    Diagnosis  POA   • **Multifocal pneumonia [J18.9]  Yes   • Weakness [R53.1]  Yes   • Falls [W19.XXXA]  Yes   • Hyponatremia [E87.1]  Yes   • Dehydration [E86.0]  Yes   • Hyperlipidemia [E78.5]  Yes   • Anemia, chronic disease [D63.8]  Yes   • Metabolic encephalopathy [G93.41]  Yes   • Hypokalemia [E87.6]  Yes   • Chronic back pain [M54.9, G89.29]  Yes   • CKD (chronic kidney disease) [N18.9]  Yes   • Essential hypertension [I10]  Yes      Resolved Hospital Problems   No resolved problems to display.           ·  metabolic encephalopathy/ weakness/ myalgia/multiple falls /dizziness.  This is multifactorial secondary to pneumonia/UTI/electrolyte imbalance/dehydration/illicit drug use/medication prescription.  CNS examination is normal.  CT scan of the brain without acute deficits.  Improving.  Look below for management.  We will ask PT and OT to evaluate.  · Bilateral multifocal pneumonia.  Stable respiratory status.  Blood cultures are pending.   Respiratory PCR panel is negative.  Will check urine Legionella and Streptococcus antigen.  Check sputum.  Arterial blood gas does not show CO2 narcosis.  We will continue oxygen and wean off as needed.  We will continue incentive spirometry.  We will continue Rocephin and add Zithromax.  Will add Mucinex.  · UTI.  Will check urine cultures and continue Rocephin.  · History of anxiety/depression/chronic pain syndrome.  Illicit drug use.  Patient herself denies any illicit drug use.  She does not know how she tested positive for marijuana and amphetamine.  She is also on high-dose Percocet and Ambien for sleep and Zanaflex scheduled as a muscle relaxant.  At this time I will continue Cymbalta and Lamictal.  Will decrease the dose of Percocet and give it as needed.  We will stop Ambien and initiate melatonin.  Will ask access center to evaluate.  We will ask PT and OT to evaluate.  · Stage IIIb chronic renal failure/hyponatremia/hypokalemia/dehydration.  Will check magnesium.  Will substitute the potassium.  Will hydrate with normal saline and monitor renal function and electrolytes.  · Hyperlipidemia/abdominal aortic aneurysm.  Continue aspirin and Crestor.  Check CK secondary to weakness and myalgia.  · History of coronary artery disease and hypertension.  No clinical evidence of angina or congestive heart failure.  We will continue aspirin/hydralazine/Imdur/Toprol.  Monitor blood pressure.  I will hold Norvasc right now to avoid hypotension.  · Anemia of chronic disease.  Baseline hemoglobin between 10 and 12.8.  Hemoglobin around the baseline.  Will monitor.  · Hypothyroidism.  Appears clinically euthyroid.  Continue current replacement and check TSH.  · Elevated liver function test.  Benign GI examination.  Mostly secondary to medication and possibly fatty liver.  Will observe.  · VTE prophylaxis. lovenox        · Discussed with patient/nurse.  · Disposition.  Hopefully home in 2 to 3 days      Emily Almanza  MD [facility]  06/05/22  11:33 EDT

## 2022-06-05 NOTE — ED NOTES
Nursing report ED to floor  Sandra Kellogg  61 y.o.  female    HPI :   Chief Complaint   Patient presents with   • Altered Mental Status       Admitting doctor:   Cristino Montenegor MD    Admitting diagnosis:   The primary encounter diagnosis was Multifocal pneumonia. Diagnoses of Altered mental status, unspecified altered mental status type and Hypokalemia were also pertinent to this visit.    Code status:   Current Code Status     Date Active Code Status Order ID Comments User Context       6/4/2022 1828 CPR (Attempt to Resuscitate) [id number]  Cristino Goldsmith, APRN ED     Advance Care Planning Activity      Questions for Current Code Status     Question Answer    Code Status (Patient has no pulse and is not breathing) CPR (Attempt to Resuscitate)    Medical Interventions (Patient has pulse or is breathing) Full    Level Of Support Discussed With Patient          Allergies:   Metformin and Clindamycin/lincomycin    Intake and Output    Intake/Output Summary (Last 24 hours) at 6/4/2022 2021  Last data filed at 6/4/2022 2000  Gross per 24 hour   Intake 100 ml   Output --   Net 100 ml       Weight:   There were no vitals filed for this visit.    Most recent vitals:   Vitals:    06/04/22 1801 06/04/22 1819 06/04/22 1826 06/04/22 1931   BP: 121/69   120/70   Pulse: 76  69 70   Resp:    20   Temp:       SpO2: 90% 92% 94% 92%       Active LDAs/IV Access:   Lines, Drains & Airways     Active LDAs     Name Placement date Placement time Site Days    Peripheral IV 06/04/22 1704 Right Antecubital 06/04/22  1704  Antecubital  less than 1                Labs (abnormal labs have a star):   Labs Reviewed   COMPREHENSIVE METABOLIC PANEL - Abnormal; Notable for the following components:       Result Value    Glucose 125 (*)     Creatinine 1.62 (*)     Sodium 134 (*)     Potassium 2.5 (*)     Chloride 92 (*)     AST (SGOT) 36 (*)     Alkaline Phosphatase 122 (*)     eGFR 36.2 (*)     All other components within normal limits    Narrative:      GFR Normal >60  Chronic Kidney Disease <60  Kidney Failure <15     URINALYSIS W/ MICROSCOPIC IF INDICATED (NO CULTURE) - Abnormal; Notable for the following components:    Color, UA Dark Yellow (*)     Appearance, UA Turbid (*)     Ketones, UA Trace (*)     Blood, UA Large (3+) (*)     Protein, UA >=300 mg/dL (3+) (*)     Leuk Esterase, UA Small (1+) (*)     All other components within normal limits   URINE DRUG SCREEN - Abnormal; Notable for the following components:    Amphet/Methamphet, Screen Positive (*)     THC, Screen, Urine Positive (*)     Oxycodone Screen, Urine Positive (*)     All other components within normal limits    Narrative:     Negative Thresholds Per Drugs Screened:    Amphetamines                 500 ng/ml  Barbiturates                 200 ng/ml  Benzodiazepines              100 ng/ml  Cocaine                      300 ng/ml  Methadone                    300 ng/ml  Opiates                      300 ng/ml  Oxycodone                    100 ng/ml  THC                           50 ng/ml    The Normal Value for all drugs tested is negative. This report includes final unconfirmed screening results to be used for medical treatment purposes only. Unconfirmed results must not be used for non-medical purposes such as employment or legal testing. Clinical consideration should be applied to any drug of abuse test, particularly when unconfirmed results are used.           CBC WITH AUTO DIFFERENTIAL - Abnormal; Notable for the following components:    WBC 14.28 (*)     Neutrophil % 81.4 (*)     Lymphocyte % 7.7 (*)     Immature Grans % 0.8 (*)     Neutrophils, Absolute 11.63 (*)     Monocytes, Absolute 1.33 (*)     Immature Grans, Absolute 0.11 (*)     All other components within normal limits   URINALYSIS, MICROSCOPIC ONLY - Abnormal; Notable for the following components:    RBC, UA 3-5 (*)     WBC, UA 6-12 (*)     Bacteria, UA 4+ (*)     Squamous Epithelial Cells, UA 3-6 (*)     All other components  within normal limits   BLOOD GAS, ARTERIAL - Abnormal; Notable for the following components:    pH, Arterial 7.503 (*)     pO2, Arterial 70.5 (*)     HCO3, Arterial 28.6 (*)     Base Excess, Arterial 5.2 (*)     All other components within normal limits   RESPIRATORY PANEL PCR W/ COVID-19 (SARS-COV-2) TYSON/DAYSI/ELAINE/PAD/COR/MAD/MICHAEL IN-HOUSE, NP SWAB IN [facility], 3-4 HR TAT - Normal    Narrative:     In the setting of a positive respiratory panel with a viral infection PLUS a negative procalcitonin without other underlying concern for bacterial infection, consider observing off antibiotics or discontinuation of antibiotics and continue supportive care. If the respiratory panel is positive for atypical bacterial infection (Bordetella pertussis, Chlamydophila pneumoniae, or Mycoplasma pneumoniae), consider antibiotic de-escalation to target atypical bacterial infection.   ACETAMINOPHEN LEVEL - Normal   LACTIC ACID, PLASMA - Normal   BLOOD CULTURE   BLOOD CULTURE   ETHANOL   BLOOD GAS, ARTERIAL   SALICYLATE LEVEL   PROCALCITONIN   CBC AND DIFFERENTIAL    Narrative:     The following orders were created for panel order CBC & Differential.  Procedure                               Abnormality         Status                     ---------                               -----------         ------                     CBC Auto Differential[[id number]]        Abnormal            Final result                 Please view results for these tests on the individual orders.       EKG:   ECG 12 Lead   Preliminary Result   HEART RATE= 67  bpm   RR Interval= 896  ms   NV Interval= 207  ms   P Horizontal Axis= 7  deg   P Front Axis= 40  deg   QRSD Interval= 137  ms   QT Interval= 483  ms   QRS Axis= -4  deg   T Wave Axis= 1  deg   - ABNORMAL ECG -   Sinus rhythm   Borderline prolonged NV interval   Left bundle branch block   Electronically Signed By:    Date and Time of Study: 2022-06-04 17:05:23          Meds given in ED:   Medications    AZITHROMYCIN 500 MG/250 ML 0.9% NS IVPB (vial-mate) (500 mg Intravenous New Bag 6/4/22 2016)   nitroglycerin (NITROSTAT) SL tablet 0.4 mg (has no administration in time range)   acetaminophen (TYLENOL) tablet 650 mg (has no administration in time range)   ondansetron (ZOFRAN) tablet 4 mg (has no administration in time range)     Or   ondansetron (ZOFRAN) injection 4 mg (has no administration in time range)   Pharmacy to Dose enoxaparin (LOVENOX) (has no administration in time range)   cefTRIAXone (ROCEPHIN) 1 g in sodium chloride 0.9 % 100 mL IVPB-VTB (has no administration in time range)   Enoxaparin Sodium (LOVENOX) syringe 40 mg (40 mg Subcutaneous Given 6/4/22 2017)   potassium chloride (K-DUR,KLOR-CON) ER tablet 40 mEq (has no administration in time range)     Or   potassium chloride 10 mEq in 100 mL IVPB (has no administration in time range)   cefTRIAXone (ROCEPHIN) 1 g in sodium chloride 0.9 % 100 mL IVPB-VTB (0 g Intravenous Stopped 6/4/22 2000)   potassium chloride (K-DUR,KLOR-CON) ER tablet 40 mEq (40 mEq Oral Given 6/4/22 1908)       Imaging results:  CT Head Without Contrast    Result Date: 6/4/2022   No evidence for acute traumatic intracranial pathology.  Moderate changes of inflammatory paranasal sinus disease are incidentally noted.  Radiation dose reduction techniques were utilized, including automated exposure control and exposure modulation based on body size.       XR Chest 1 View    Result Date: 6/4/2022   Findings most compatible with multifocal areas of pneumonia.  These findings were discussed with Dr. Kirkpatrick on 06/04/2022 at approximately 5:55 PM.        Ambulatory status:   - standby assist    Social issues:   Social History     Socioeconomic History   • Marital status:    • Number of children: 3   • Years of education: college   Tobacco Use   • Smoking status: Former Smoker     Packs/day: 1.50     Years: 35.00     Pack years: 52.50     Types: Cigarettes     Start date: 1976      Quit date: 2009     Years since quittin.4   • Smokeless tobacco: Never Used   Substance and Sexual Activity   • Alcohol use: No   • Drug use: No   • Sexual activity: Never     Partners: Male     Comment: .       NIH Stroke Scale:        Nursing report ED to floor:

## 2022-06-05 NOTE — PLAN OF CARE
Goal Outcome Evaluation:               VSS. Up with assistance to bathroom; urine studies pending urine sample. Waffle cushion overlay added to bed to help with back pain. Nystatin ordered for skin folds. PRN Percocet x 1 for back pain.

## 2022-06-05 NOTE — PLAN OF CARE
Problem: Adult Inpatient Plan of Care  Goal: Plan of Care Review  6/5/2022 0535 by Bonny Herrera RN  Outcome: Ongoing, Progressing  Flowsheets (Taken 6/5/2022 0535)  Progress: no change  Plan of Care Reviewed With: patient  Outcome Evaluation: pt confused, oriented to person only however can give detailed information at times, ie medication list but then thinks she is in [place] and doesn't know date.  up to bathroon frequently, no co pain at present.  resting on and off w eyes closed this shift.  cooperative with care.  no distress noted. will cont to monitor   Goal Outcome Evaluation:  Plan of Care Reviewed With: patient        Progress: no change  Outcome Evaluation: pt confused, oriented to person only however can give detailed information at times, ie medication list but then thinks she is in [place] and doesn't know date.  up to bathroon frequently, no co pain at present.  resting on and off w eyes closed this shift.  cooperative with care.  no distress noted. will cont to monitor

## 2022-06-06 PROBLEM — M62.82 RHABDOMYOLYSIS: Status: ACTIVE | Noted: 2022-06-06

## 2022-06-06 LAB
ALBUMIN SERPL-MCNC: 3.3 G/DL (ref 3.5–5.2)
ALBUMIN/GLOB SERPL: 0.8 G/DL
ALP SERPL-CCNC: 116 U/L (ref 39–117)
ALT SERPL W P-5'-P-CCNC: 31 U/L (ref 1–33)
ANION GAP SERPL CALCULATED.3IONS-SCNC: 14.2 MMOL/L (ref 5–15)
AST SERPL-CCNC: 40 U/L (ref 1–32)
BASOPHILS # BLD AUTO: 0.06 10*3/MM3 (ref 0–0.2)
BASOPHILS NFR BLD AUTO: 0.9 % (ref 0–1.5)
BILIRUB SERPL-MCNC: 0.3 MG/DL (ref 0–1.2)
BUN SERPL-MCNC: 12 MG/DL (ref 8–23)
BUN/CREAT SERPL: 8.9 (ref 7–25)
CALCIUM SPEC-SCNC: 9.1 MG/DL (ref 8.6–10.5)
CHLORIDE SERPL-SCNC: 104 MMOL/L (ref 98–107)
CK SERPL-CCNC: 278 U/L (ref 20–180)
CO2 SERPL-SCNC: 23.8 MMOL/L (ref 22–29)
CREAT SERPL-MCNC: 1.35 MG/DL (ref 0.57–1)
DEPRECATED RDW RBC AUTO: 41.2 FL (ref 37–54)
EGFRCR SERPLBLD CKD-EPI 2021: 45.1 ML/MIN/1.73
EOSINOPHIL # BLD AUTO: 0.44 10*3/MM3 (ref 0–0.4)
EOSINOPHIL NFR BLD AUTO: 6.3 % (ref 0.3–6.2)
ERYTHROCYTE [DISTWIDTH] IN BLOOD BY AUTOMATED COUNT: 13.3 % (ref 12.3–15.4)
GLOBULIN UR ELPH-MCNC: 4.1 GM/DL
GLUCOSE SERPL-MCNC: 104 MG/DL (ref 65–99)
HCT VFR BLD AUTO: 37.1 % (ref 34–46.6)
HGB BLD-MCNC: 12.8 G/DL (ref 12–15.9)
IMM GRANULOCYTES # BLD AUTO: 0.31 10*3/MM3 (ref 0–0.05)
IMM GRANULOCYTES NFR BLD AUTO: 4.4 % (ref 0–0.5)
L PNEUMO1 AG UR QL IA: NEGATIVE
LYMPHOCYTES # BLD AUTO: 1.75 10*3/MM3 (ref 0.7–3.1)
LYMPHOCYTES NFR BLD AUTO: 25.1 % (ref 19.6–45.3)
MCH RBC QN AUTO: 29.6 PG (ref 26.6–33)
MCHC RBC AUTO-ENTMCNC: 34.5 G/DL (ref 31.5–35.7)
MCV RBC AUTO: 85.7 FL (ref 79–97)
MONOCYTES # BLD AUTO: 0.79 10*3/MM3 (ref 0.1–0.9)
MONOCYTES NFR BLD AUTO: 11.3 % (ref 5–12)
NEUTROPHILS NFR BLD AUTO: 3.62 10*3/MM3 (ref 1.7–7)
NEUTROPHILS NFR BLD AUTO: 52 % (ref 42.7–76)
NRBC BLD AUTO-RTO: 0 /100 WBC (ref 0–0.2)
PLATELET # BLD AUTO: 366 10*3/MM3 (ref 140–450)
PMV BLD AUTO: 8.5 FL (ref 6–12)
POTASSIUM SERPL-SCNC: 3.3 MMOL/L (ref 3.5–5.2)
PROT SERPL-MCNC: 7.4 G/DL (ref 6–8.5)
RBC # BLD AUTO: 4.33 10*6/MM3 (ref 3.77–5.28)
S PNEUM AG SPEC QL LA: NEGATIVE
SALICYLATES SERPL-MCNC: <4 MG/DL
SODIUM SERPL-SCNC: 142 MMOL/L (ref 136–145)
WBC NRBC COR # BLD: 6.97 10*3/MM3 (ref 3.4–10.8)

## 2022-06-06 PROCEDURE — 97161 PT EVAL LOW COMPLEX 20 MIN: CPT

## 2022-06-06 PROCEDURE — G0378 HOSPITAL OBSERVATION PER HR: HCPCS

## 2022-06-06 PROCEDURE — 80053 COMPREHEN METABOLIC PANEL: CPT | Performed by: INTERNAL MEDICINE

## 2022-06-06 PROCEDURE — 25010000002 AZITHROMYCIN PER 500 MG: Performed by: INTERNAL MEDICINE

## 2022-06-06 PROCEDURE — 25010000002 CEFTRIAXONE PER 250 MG

## 2022-06-06 PROCEDURE — 96372 THER/PROPH/DIAG INJ SC/IM: CPT

## 2022-06-06 PROCEDURE — 85025 COMPLETE CBC W/AUTO DIFF WBC: CPT | Performed by: INTERNAL MEDICINE

## 2022-06-06 PROCEDURE — 25010000002 ENOXAPARIN PER 10 MG

## 2022-06-06 PROCEDURE — 82550 ASSAY OF CK (CPK): CPT | Performed by: INTERNAL MEDICINE

## 2022-06-06 RX ADMIN — HYDRALAZINE HYDROCHLORIDE 25 MG: 25 TABLET, FILM COATED ORAL at 08:45

## 2022-06-06 RX ADMIN — NYSTATIN: 100000 POWDER TOPICAL at 21:10

## 2022-06-06 RX ADMIN — LEVOTHYROXINE SODIUM 125 MCG: 0.12 TABLET ORAL at 08:45

## 2022-06-06 RX ADMIN — Medication 5 MG: at 21:01

## 2022-06-06 RX ADMIN — ASPIRIN 81 MG: 81 TABLET, COATED ORAL at 08:45

## 2022-06-06 RX ADMIN — LAMOTRIGINE 25 MG: 25 TABLET ORAL at 21:01

## 2022-06-06 RX ADMIN — OXYCODONE AND ACETAMINOPHEN 1 TABLET: 5; 325 TABLET ORAL at 10:16

## 2022-06-06 RX ADMIN — CEFTRIAXONE 1 G: 1 INJECTION, POWDER, FOR SOLUTION INTRAMUSCULAR; INTRAVENOUS at 18:37

## 2022-06-06 RX ADMIN — SUCRALFATE 1 G: 1 TABLET ORAL at 08:45

## 2022-06-06 RX ADMIN — OXYCODONE AND ACETAMINOPHEN 1 TABLET: 5; 325 TABLET ORAL at 00:04

## 2022-06-06 RX ADMIN — FAMOTIDINE 20 MG: 20 TABLET ORAL at 21:01

## 2022-06-06 RX ADMIN — OXYCODONE AND ACETAMINOPHEN 1 TABLET: 5; 325 TABLET ORAL at 21:07

## 2022-06-06 RX ADMIN — ISOSORBIDE MONONITRATE 60 MG: 60 TABLET ORAL at 08:45

## 2022-06-06 RX ADMIN — FAMOTIDINE 20 MG: 20 TABLET ORAL at 08:45

## 2022-06-06 RX ADMIN — GUAIFENESIN 600 MG: 600 TABLET, EXTENDED RELEASE ORAL at 08:45

## 2022-06-06 RX ADMIN — ENOXAPARIN SODIUM 40 MG: 100 INJECTION SUBCUTANEOUS at 21:01

## 2022-06-06 RX ADMIN — METOPROLOL SUCCINATE 50 MG: 50 TABLET, EXTENDED RELEASE ORAL at 08:45

## 2022-06-06 RX ADMIN — GUAIFENESIN 600 MG: 600 TABLET, EXTENDED RELEASE ORAL at 21:01

## 2022-06-06 RX ADMIN — SUCRALFATE 1 G: 1 TABLET ORAL at 12:43

## 2022-06-06 RX ADMIN — NYSTATIN: 100000 POWDER TOPICAL at 08:46

## 2022-06-06 RX ADMIN — HYDRALAZINE HYDROCHLORIDE 25 MG: 25 TABLET, FILM COATED ORAL at 21:01

## 2022-06-06 RX ADMIN — METOPROLOL SUCCINATE 50 MG: 50 TABLET, EXTENDED RELEASE ORAL at 21:01

## 2022-06-06 RX ADMIN — AZITHROMYCIN MONOHYDRATE 500 MG: 500 INJECTION, POWDER, LYOPHILIZED, FOR SOLUTION INTRAVENOUS at 12:43

## 2022-06-06 NOTE — THERAPY EVALUATION
Patient Name: Sandra Kellogg  : 1960    MRN: [id number]                              Today's Date: 2022       Admit Date: 2022    Visit Dx:     ICD-10-CM ICD-9-CM   1. Multifocal pneumonia  J18.9 486   2. Altered mental status, unspecified altered mental status type  R41.82 780.97   3. Hypokalemia  E87.6 276.8     Patient Active Problem List   Diagnosis   • Impingement syndrome of right shoulder   • Osteoarthritis of right hip   • Osteoarthritis of right knee   • Primary osteoarthritis of left hip   • Renal insufficiency   • Trochanteric bursitis of left hip   • Trochanteric bursitis of right hip   • Essential hypertension   • Hyperlipidemia   • Acquired hypothyroidism   • Myocardial infarction (HCC)   • Arthritis of left knee   • Status post total left knee replacement   • Depression   • Urge incontinence of urine   • Dehydration   • Nausea vomiting and diarrhea   • Polypharmacy   • Duodenitis   • Adenomatous colon polyp   • CKD (chronic kidney disease)   • Hyperkalemia   • Radial head fracture   • Hypocortisolemia (HCC)   • Severe sepsis with acute organ dysfunction (HCC)   • Abdominal aortic aneurysm (AAA) without rupture (HCC)   • Constipation   • Pelvic floor dysfunction in female   • Stented coronary artery   • Acute renal failure (HCC)   • Chronic kidney disease   • Arteriosclerosis of coronary artery   • Chronic back pain   • Multifocal pneumonia   • Metabolic encephalopathy   • Hypokalemia   • Weakness   • Falls   • Hyponatremia   • Dehydration   • Hyperlipidemia   • Anemia, chronic disease   • Elevated liver function tests   • Rhabdomyolysis     Past Medical History:   Diagnosis Date   • Abdominal aortic aneurysm (AAA) without rupture (HCC) 9/15/2020   • Abnormal CT scan of lung 2019    DONE AT [facility]   • Achilles tendinitis of both lower extremities 10/2016   • Acquired hypothyroidism 2017   • Anxiety    • Baker's cyst    • Bilateral lower leg cellulitis 10/21/2016    SEEN AT [facility] ER    • C. difficile colitis 08/2020   • CAD (coronary artery disease)    • Chronic pain 1/24/2019   • CKD (chronic kidney disease) 2/28/2019    STAGE 3, FOLLOWED BY DR. ROBERT LR   • Colon polyps     Followed by Dr. Robert Trent   • Constipation    • Cystitis 03/25/2019   • DDD (degenerative disc disease), lumbar    • Dehydration 10/2020   • Dehydration 01/23/2019    WITH CATHERINE, ADMITTED TO [facility]   • Depression    • Duodenitis 05/2020   • Erosive gastritis 03/2019   • GERD (gastroesophageal reflux disease)    • GI (gastrointestinal bleed)    • GI hemorrhage 09/2020   • H. pylori infection    • Hyperkalemia 3/1/2019   • Hyperlipidemia     MIXED   • Hypertension    • Hypocortisolemia ([facility]) 8/23/2020   • Impingement syndrome of left shoulder 02/2019   • Impingement syndrome of right shoulder 7/23/2013   • Insomnia    • Insulin resistance    • Metabolic syndrome    • Metabolic syndrome    • Microhematuria    • Myocardial infarction ([facility]) 11/24/2005    PTCA WITH STENT X3, ADMITTED TO [facility]   • Osteoarthritis     MULTIPLE SITES   • Post-tussive emesis 08/2019   • Pulmonary nodule 08/2019   • Radial head fracture 08/08/2019    Closed nondisplaced fracture right radius   • Renal anomaly 11/29/2010    RENAL US: ASYMMETRY OF RENAL SIZE WITH THE RIGHT KIDNEY LARGER THAN THE LEFT. FETAL LOBULATION OR DROMEDARY HUMP ALONG THE ANTERIOR MID POLE OF THE LEFT KIDNEY. CORTICOL THINNING ON LEFT SIDE.   • Renal insufficiency    • Rotator cuff tendinitis, left 09/2019   • Rotator cuff tendinitis, right 12/2019   • Septic shock due to Clostridium difficile ([facility]) 08/14/2020    ADMITTED TO [facility]   • Syncope and collapse 08/2020   • Tear of lateral meniscus of right knee 02/2016   • Trochanteric bursitis of right hip 6/4/2014   • Trochanteric bursitis, left hip 05/2019   • Urge incontinence of urine 10/2/2018   • Vitamin C deficiency 08/2020     Past Surgical History:   Procedure Laterality Date   • ANORECTAL MANOMETRY N/A 1/14/2021     Procedure: ANORECTAL MANOMETRY;  Surgeon: Rubi May MD;  Location: [facility] ENDOSCOPY;  Service: General;  Laterality: N/A;  pre-constipation  pose-same   • CARPAL TUNNEL RELEASE WITH CUBITAL TUNNEL RELEASE Right 10/03/2017    WITH 1RST [facility] TRAPEZIUM RESECTION AND ARTHROPLASTY, DR. CATIA MALCOLM AT [facility]   • CARPAL TUNNEL RELEASE WITH CUBITAL TUNNEL RELEASE Left 12/26/2017    WITH 1RST [facility] ARTHROPLASTY AND LIGAMENT RECONSTRUCTION, DR. CATIA MALCOLM AT [facility]   • COLONOSCOPY N/A 1/27/2019    5 MM TUBULAR ADENOMA POLYP IN TRANSVERSE, 5 MM TUBULAR ADENOMA POLYP IN CECUM, 4 MM HYPERPLASTIC POLYP IN SIGMOID, RANDOM COLON BX BENIGN, DR. SIL GROVES AT [facility]   • CORONARY ANGIOPLASTY WITH STENT PLACEMENT  11/2005    3 STENTS, AT [facility]   • CORONARY ANGIOPLASTY WITH STENT PLACEMENT  2004    1 STENT   • ENDOSCOPY N/A 1/27/2019    CHRONIC PEPTIC DUODENITIS, MILD CHRONIC GASTRITIS, NON SEVERE REFLUX ESOPHAGITIS, DR. SIL GROVES AT [facility]   • EXPLORATORY LAPAROTOMY N/A 08/15/2020    DR. JULIANNA SINHA AT [facility]   • KNEE ARTHROSCOPY W/ MENISCAL REPAIR Right 02/02/2016    PARTIAL LATERAL AND MEDIAL MENISCUS REPAIR, DR. RYLAN CHAVIS AT [facility]   • LAMINECTOMY AND MICRODISCECTOMY LUMBAR SPINE N/A 1995    L4-L5   • LUMBAR EPIDURAL INJECTION N/A 02/14/2019    DR. ANDREW OSPINA AT [facility]   • LUMBAR EPIDURAL INJECTION N/A 03/28/2019    DR. ANDREW OSPINA AT [facility]   • LUMBAR EPIDURAL INJECTION N/A 11/05/2020    DR. ANDREW OSPINA AT [facility]   • TOTAL KNEE ARTHROPLASTY Left 11/01/2017    DR. RYLAN CHAVIS AT [facility]   • TOTAL KNEE ARTHROPLASTY Right 05/10/2017    DR. RYLAN CHAVIS AT [facility]   • TUBAL ABDOMINAL LIGATION Bilateral    • ULNAR NERVE DECOMPRESSION Left       General Information     Row Name 06/06/22 1443          Physical Therapy Time and Intention    Document Type evaluation  -     Mode of Treatment physical therapy;individual therapy  -     Row Name 06/06/22 1443          General  Information    Patient Profile Reviewed yes  -     Prior Level of Function independent:  Ambulates with single point cane  -     Existing Precautions/Restrictions fall  -     Barriers to Rehab none identified  -     Row Name 06/06/22 1443          Living Environment    People in Home significant other  -     Row Name 06/06/22 1443          Home Main Entrance    Number of Stairs, Main Entrance three  Into RV  -     Row Name 06/06/22 1443          Cognition    Orientation Status (Cognition) oriented x 3  -     Row Name 06/06/22 1443          Safety Issues, Functional Mobility    Impairments Affecting Function (Mobility) strength;endurance/activity tolerance;pain  -           User Key  (r) = Recorded By, (t) = Taken By, (c) = Cosigned By    Initials Name Provider Type     Unique Mayer PT Physical Therapist               Mobility     Row Name 06/06/22 1444          Bed Mobility    Comment, (Bed Mobility) [facility] upon arrival  -     Row Name 06/06/22 1444          Bed-Chair Transfer    Bed-Chair [place] (Transfers) not tested  -     Row Name 06/06/22 1444          Sit-Stand Transfer    Sit-Stand [place] (Transfers) standby assist  -     Assistive Device (Sit-Stand Transfers) walker, front-wheeled  -     Row Name 06/06/22 1444          Gait/Stairs (Locomotion)    [place] Level (Gait) contact guard  -     Assistive Device (Gait) walker, front-wheeled  -     Distance in Feet (Gait) 100ft  -     Deviations/Abnormal Patterns (Gait) brittni decreased;gait speed decreased  -     [place] Level (Stairs) not tested  -           User Key  (r) = Recorded By, (t) = Taken By, (c) = Cosigned By    Initials Name Provider Type     Unique Mayer PT Physical Therapist               Obj/Interventions     Row Name 06/06/22 1445          Range of Motion Comprehensive    General Range of Motion bilateral lower extremity ROM WFL  -     Row Name 06/06/22 1445          Strength  Comprehensive (MMT)    Comment, General Manual Muscle Testing (MMT) Assessment Generalized lower extremity weakness. B LE 4/5  -     Row Name 06/06/22 1445          Motor Skills    Motor Skills functional endurance  -[facility] Name 06/06/22 1445          Balance    Balance Assessment sitting static balance;sitting dynamic balance;sit to stand dynamic balance;standing static balance;standing dynamic balance  -     Static Sitting Balance independent  -     Dynamic Sitting Balance modified independence  -     Position, Sitting Balance sitting in chair  -     Sit to Stand Dynamic Balance standby assist  -     Static Standing Balance standby assist  -     Dynamic Standing Balance contact guard  -     Position/Device Used, Standing Balance walker, front-wheeled  -     Balance Interventions sitting;standing;sit to stand;supported;static;dynamic;minimal challenge  -[facility] Name 06/06/22 1445          Sensory Assessment (Somatosensory)    Sensory Assessment (Somatosensory) sensation intact  -           User Key  (r) = Recorded By, (t) = Taken By, (c) = Cosigned By    Initials Name Provider Type     Unique Mayer, PT Physical Therapist               Goals/Plan     [facility] Name 06/06/22 1451          Bed Mobility Goal 1 (PT)    Activity/Assistive Device (Bed Mobility Goal 1, PT) bed mobility activities, all  -     [place] Level/Cues Needed (Bed Mobility Goal 1, PT) independent  -     Time Frame (Bed Mobility Goal 1, PT) 1 week  -[facility] Name 06/06/22 1451          Transfer Goal 1 (PT)    Activity/Assistive Device (Transfer Goal 1, PT) transfers, all;cane, straight;walker, rolling  -     [place] Level/Cues Needed (Transfer Goal 1, PT) supervision required  -     Time Frame (Transfer Goal 1, PT) 1 week  -[facility] Name 06/06/22 1451          Gait Training Goal 1 (PT)    Activity/Assistive Device (Gait Training Goal 1, PT) gait (walking locomotion);cane, straight;walker, rolling   -SM     [place] Level (Gait Training Goal 1, PT) standby assist  -SM     Distance (Gait Training Goal 1, PT) 150ft  -SM     Time Frame (Gait Training Goal 1, PT) 1 week  -SM           User Key  (r) = Recorded By, (t) = Taken By, (c) = Cosigned By    Initials Name Provider Type    SM [place] Mayer, PT Physical Therapist               Clinical Impression     Row Name 06/06/22 1446          Plan of Care Review    Plan of Care Reviewed With patient  -SM     Outcome Evaluation Patient was alert and oriented today. Patient agreeable to PT. At baseline patient lives with significant other in an RV and uses a straight point cane for ambulation. Patient reports they are currently staying at [facility] for the summer.  Patient was [facility] upon arrival today. Patient completed a sit to stand transfer with SBA. Patient ambulated 100ft with Rwx and CGA today. Distance limited due to feeling fatigued. Patient demonstrates generalized LE weakness and decreased activity endurance. Patient would benefit from skilled PT to address deficits. At this time PT recommends home with assist homehealth PT vs. Outpatient per patient request.  Will continue to monitor.  -     Row Name 06/06/22 1446          Therapy Assessment/Plan (PT)    Patient/Family Therapy Goals Statement (PT) Outpatient PT  -SM     Rehab Potential (PT) good, to achieve stated therapy goals  -SM     Criteria for Skilled Interventions Met (PT) yes  -SM     Therapy Frequency (PT) 5 times/wk  -SM     Row Name 06/06/22 1446          Vital Signs    Pre Patient Position Sitting  -SM     Intra Patient Position Standing  -SM     Post Patient Position Sitting  -SM     Row Name 06/06/22 1446          Positioning and Restraints    Pre-Treatment Position sitting in chair/recliner  -SM     Post Treatment Position chair  -SM     In Chair call light within reach;encouraged to call for assist;sitting;exit alarm on  -SM           User Key  (r) = Recorded By, (t) = Taken By, (c) =  Cosigned By    Initials Name Provider Type     Unique Mayer PT Physical Therapist               Outcome Measures     Row Name 06/06/22 1451          How much help from another person do you currently need...    Turning from your back to your side while in flat bed without using bedrails? 4  -SM     Moving from lying on back to sitting on the side of a flat bed without bedrails? 4  -SM     Moving to and from a bed to a chair (including a wheelchair)? 3  -SM     Standing up from a chair using your arms (e.g., wheelchair, bedside chair)? 3  -SM     Climbing 3-5 steps with a railing? 3  -SM     To walk in hospital room? 3  -SM     AM-PAC 6 Clicks Score (PT) 20  -     Highest level of mobility 6 --> Walked 10 steps or more  -     Row Name 06/06/22 1451          Functional Assessment    Outcome Measure Options AM-PAC 6 Clicks Basic Mobility (PT)  -           User Key  (r) = Recorded By, (t) = Taken By, (c) = Cosigned By    Initials Name Provider Type     Unique Mayer PT Physical Therapist                             Physical Therapy Education                 Title: PT OT SLP Therapies (Done)     Topic: Physical Therapy (Done)     Point: Mobility training (Done)     Learning Progress Summary           Patient Acceptance, TB,E, VU by  at 6/6/2022 1452                   Point: Home exercise program (Done)     Learning Progress Summary           Patient Acceptance, TB,E, VU by  at 6/6/2022 1452                   Point: Body mechanics (Done)     Learning Progress Summary           Patient Acceptance, TB,E, VU by  at 6/6/2022 1452                   Point: Precautions (Done)     Learning Progress Summary           Patient Acceptance, TB,E, VU by  at 6/6/2022 1452                               User Key     Initials Effective Dates Name Provider Type [facility] 05/02/22 -  Unique Mayer PT Physical Therapist PT              PT Recommendation and Plan     Plan of Care Reviewed With:  patient  Outcome Evaluation: Patient was alert and oriented today. Patient agreeable to PT. At baseline patient lives with significant other in an RV and uses a straight point cane for ambulation. Patient reports they are currently staying at [facility] for the summer.  Patient was [facility] upon arrival today. Patient completed a sit to stand transfer with SBA. Patient ambulated 100ft with Rwx and CGA today. Distance limited due to feeling fatigued. Patient demonstrates generalized LE weakness and decreased activity endurance. Patient would benefit from skilled PT to address deficits. At this time PT recommends home with assist homehealth PT vs. Outpatient per patient request.  Will continue to monitor.     Time Calculation:    PT Charges     Row Name 06/06/22 1452             Time Calculation    Start Time 1345  -      Stop Time 1358  -      Time Calculation (min) 13 min  -SM      PT Received On 06/06/22  -      PT - Next Appointment 06/07/22  -      PT Goal Re-Cert Due Date 06/13/22  -            User Key  (r) = Recorded By, (t) = Taken By, (c) = Cosigned By    Initials Name Provider Type     Unique Mayer PT Physical Therapist              Therapy Charges for Today     Code Description Service Date Service Provider Modifiers Qty    [id number] HC PT EVAL LOW COMPLEXITY 3 6/6/2022 Unique Mayer, DUSTIN GP 1          PT G-Codes  Outcome Measure Options: AM-PAC 6 Clicks Basic Mobility (PT)  AM-PAC 6 Clicks Score (PT): 20  Patient was not wearing a face mask during this therapy encounter. Therapist used appropriate personal protective equipment including mask and gloves.  Mask used was standard procedure mask. Appropriate PPE was worn during the entire therapy session. Hand hygiene was completed before and after therapy session. Patient is not in enhanced droplet precautions.     Unique Mayer PT  6/6/2022

## 2022-06-06 NOTE — PROGRESS NOTES
Name: Sandra Kellogg ADMIT: 2022   : 1960  PCP: Jh Muniz MD    MRN: [id number] LOS: 2 days   AGE/SEX: 61 y.o. female  ROOM: [facility]     Subjective   Subjective    Patient feels well.  Improved weakness.  Weakness.  No confusion.  No headache.  No dizziness.  No loss of consciousness.  No focal neurological symptoms.  Positive occasional dry cough.  No shortness of breath.  No wheeze.  No hemoptysis.  No chest pain.  No fever or chills.    Review of Systems  GI.  No abdominal pain or nausea or vomiting.  Normal bowel movement without constipation/diarrhea/bleeding per rectum/melena.  .  Positive frequency and urgency.  No hematuria or dysuria.       Objective   Objective   Vital Signs  Temp:  [98.1 °F (36.7 °C)-98.6 °F (37 °C)] 98.1 °F (36.7 °C)  Heart Rate:  [71-81] 81  Resp:  [16] 16  BP: (120-139)/(60-72) 139/66  SpO2:  [92 %-97 %] 95 %  on  Flow (L/min):  [2] 2;   Device (Oxygen Therapy): room air    Intake/Output Summary (Last 24 hours) at 2022 1456  Last data filed at 2022 1300  Gross per 24 hour   Intake 1363 ml   Output 1350 ml   Net 13 ml     Body mass index is 38.15 kg/m².      22  2217 22  0516 22  0405   Weight: 108 kg (238 lb 1.6 oz) 108 kg (238 lb 1.6 oz) 104 kg (229 lb 4.5 oz)     Physical Exam    General.  Middle-aged female.  Obese.  Alert and oriented x3.  In no apparent pain/distress/diaphoresis.  Normal mood and affect.  Eyes.  Pupils equal round and reactive.  Intact extraocular musculature.  No pallor or jaundice.  Normal conjunctivae and lids.  Oral cavity.  Moist mucous membrane with no lesions.    Neck.  Supple.  No JVD.  No lymphadenopathy or thyromegaly.  Cardiovascular.  Regular rate and rhythm and grade 2 systolic murmur.  Chest.  Poor but clear to auscultation bilaterally with no added sounds.  Abdomen.  Soft lax.  No tenderness.  No organomegaly.  No guarding or rebound.  Extremities.  No clubbing cyanosis or edema.  CNS.  No acute focal  neurological deficits.    Results Review:      Results from last 7 days   Lab Units 06/06/22  0655 06/05/22  1013 06/05/22  0513 06/04/22  1704   SODIUM mmol/L 142  --  137 134*   POTASSIUM mmol/L 3.3* 3.6 3.3* 2.5*   CHLORIDE mmol/L 104  --  101 92*   CO2 mmol/L 23.8  --  23.0 27.5   BUN mg/dL 12  --  15 16   CREATININE mg/dL 1.35*  --  1.40* 1.62*   GLUCOSE mg/dL 104*  --  95 125*   CALCIUM mg/dL 9.1  --  8.8 9.1   AST (SGOT) U/L 40*  --   --  36*   ALT (SGPT) U/L 31  --   --  25     Estimated Creatinine Clearance: 53 mL/min (A) (by C-G formula based on SCr of 1.35 mg/dL (H)).          Results from last 7 days   Lab Units 06/06/22  0655 06/05/22  1013   CK TOTAL U/L 278* 564*         Results from last 7 days   Lab Units 06/05/22  1013   TSH uIU/mL 0.329     Results from last 7 days   Lab Units 06/05/22  1013   MAGNESIUM mg/dL 2.7*           Invalid input(s): LDLCALC  Results from last 7 days   Lab Units 06/06/22  0655 06/05/22  0513 06/04/22  1704   WBC 10*3/mm3 6.97 10.77 14.28*   HEMOGLOBIN g/dL 12.8 11.0* 12.2   HEMATOCRIT % 37.1 31.9* 36.3   PLATELETS 10*3/mm3 366 309 307   MCV fL 85.7 85.1 88.1   MCH pg 29.6 29.3 29.6   MCHC g/dL 34.5 34.5 33.6   RDW % 13.3 13.1 13.4   RDW-SD fl 41.2 40.2 43.0   MPV fL 8.5 8.6 8.9   NEUTROPHIL % % 52.0  --  81.4*   LYMPHOCYTE % % 25.1  --  7.7*   MONOCYTES % % 11.3  --  9.3   EOSINOPHIL % % 6.3*  --  0.4   BASOPHIL % % 0.9  --  0.4   IMM GRAN % % 4.4*  --  0.8*   NEUTROS ABS 10*3/mm3 3.62  --  11.63*   LYMPHS ABS 10*3/mm3 1.75  --  1.10   MONOS ABS 10*3/mm3 0.79  --  1.33*   EOS ABS 10*3/mm3 0.44*  --  0.06   BASOS ABS 10*3/mm3 0.06  --  0.05   IMMATURE GRANS (ABS) 10*3/mm3 0.31*  --  0.11*   NRBC /100 WBC 0.0  --  0.1         Results from last 7 days   Lab Units 06/04/22  1842   PH, ARTERIAL pH units 7.503*   PO2 ART mm Hg 70.5*   PCO2, ARTERIAL mm Hg 36.3   HCO3 ART mmol/L 28.6*     Results from last 7 days   Lab Units 06/04/22  1818 06/04/22  1704   PROCALCITONIN ng/mL  --   0.09   LACTATE mmol/L 1.3  --              Results from last 7 days   Lab Units 06/04/22  1840 06/04/22  1818   BLOODCX  No growth at 24 hours No growth at 24 hours     Results from last 7 days   Lab Units 06/04/22  1813   ADENOVIRUS DETECTION BY PCR  Not Detected   CORONAVIRUS 229E  Not Detected   CORONAVIRUS HKU1  Not Detected   CORONAVIRUS NL63  Not Detected   CORONAVIRUS OC43  Not Detected   HUMAN METAPNEUMOVIRUS  Not Detected   HUMAN RHINOVIRUS/ENTEROVIRUS  Not Detected   INFLUENZA B PCR  Not Detected   PARAINFLUENZA 1  Not Detected   PARAINFLUENZA VIRUS 2  Not Detected   PARAINFLUENZA VIRUS 3  Not Detected   PARAINFLUENZA VIRUS 4  Not Detected   BORDETELLA PERTUSSIS PCR  Not Detected   CHLAMYDOPHILA PNEUMONIAE PCR  Not Detected   MYCOPLAMA PNEUMO PCR  Not Detected   INFLUENZA A PCR  Not Detected   RSV, PCR  Not Detected     Results from last 7 days   Lab Units 06/05/22  1855   NITRITE UA  Negative   WBC UA /HPF 3-5*   BACTERIA UA /HPF 2+*   SQUAM EPITHEL UA /HPF 3-6*           Imaging:  Imaging Results (Last 24 Hours)     ** No results found for the last 24 hours. **             I reviewed the patient's new clinical results / labs / tests / procedures      Assessment/Plan     Active Hospital Problems    Diagnosis  POA   • **Multifocal pneumonia [J18.9]  Yes   • Rhabdomyolysis [M62.82]  Yes   • Weakness [R53.1]  Yes   • Falls [W19.XXXA]  Yes   • Hyponatremia [E87.1]  Yes   • Dehydration [E86.0]  Yes   • Hyperlipidemia [E78.5]  Yes   • Anemia, chronic disease [D63.8]  Yes   • Elevated liver function tests [R79.89]  Yes   • Metabolic encephalopathy [G93.41]  Yes   • Hypokalemia [E87.6]  Yes   • Chronic back pain [M54.9, G89.29]  Yes   • CKD (chronic kidney disease) [N18.9]  Yes   • Essential hypertension [I10]  Yes      Resolved Hospital Problems   No resolved problems to display.           ·  metabolic encephalopathy/ weakness/ myalgia/multiple falls /dizziness.  This is multifactorial secondary to  pneumonia/UTI/electrolyte imbalance/dehydration/illicit drug use/medication/rhabdo..  CNS examination is normal.  CT scan of the brain without acute deficits.  Improving.  Look below for management.  Physical therapy recommends home physical therapy.  · Bilateral multifocal pneumonia.  Stable respiratory status.  Blood cultures are negative for now.  Respiratory PCR panel is negative.  Negative urine Legionella and Streptococcus antigen.  Unable to produce sputum culture.  Arterial blood gas does not show CO2 narcosis.  Weaned off oxygen..  Continue incentive spirometry/Rocephin/Mucinex and stop Zithromax.    · UTI.  Repeat blood cultures noncommunicative of UTI..  · History of anxiety/depression/chronic pain syndrome/illicit drug use .  Patient herself denies any illicit drug use.  She does not know how she tested positive for marijuana and amphetamine.  She is also on high-dose Percocet and Ambien for sleep and Zanaflex scheduled as a muscle relaxant.  At this time I will continue Cymbalta and Lamictal.  Will decrease the dose of Percocet and give it as needed.  Ambien stopped and melatonin initiated.  Zanaflex only as as needed.  Access center saw the patient who refused any resources and they have signed off.  I spent 15 minutes counseling the patient about this kind of combination of medications with their side effect and risk of dependency.  She was not completely satisfied with decreasing the medication and above plan but she is accepting it.  · Stage IIIb chronic renal failure/hyponatremia/hypokalemia/dehydration/hypermagnesemia..  Hypokalemia improving with substitution.  Dehydration resolved and hyponatremia resolved with IV fluid.  Stable renal function.  Appears euvolemic at this time..  · Rhabdomyolysis.  Crestor held.  CK improving.  Continue IV fluid.  · Hyperlipidemia/abdominal aortic aneurysm.  Continue aspirin.  Crestor held secondary to rhabdo.  · History of coronary artery disease and  hypertension.  No clinical evidence of angina or congestive heart failure.  We will continue aspirin/hydralazine/Imdur/Toprol.  Norvasc DC'd.  Good blood pressure control.  Continue above medications.    · Anemia of chronic disease.  Baseline hemoglobin between 10 and 12.8.  Hemoglobin around the baseline.  Will monitor.  · Hypothyroidism.  Appears clinically euthyroid.  Continue current replacement.  TSH is normal.  · Elevated liver function test.  Benign GI examination.  Mostly secondary to medication and possibly fatty liver.  Stable.  Will observe.  · VTE prophylaxis. lovenox        · Discussed with patient/.  · Disposition.  Hopefully home in 1 to 2 days with home health physical therapy.    Emily Almanza MD  [facility]  06/06/22  14:56 EDT

## 2022-06-06 NOTE — CASE MANAGEMENT/SOCIAL WORK
Discharge Planning Assessment  [facility]     Patient Name: Sandra Kellogg  MRN: [id number]  Today's Date: 6/6/2022    Admit Date: 6/4/2022     Discharge Needs Assessment     Row Name 06/06/22 1609       Living Environment    People in Home child(omar), adult;grandchild(omar)    Current Living Arrangements home    Potentially Unsafe Housing Conditions other (see comments)  no concerns    Primary Care Provided by self    Provides Primary Care For no one    Family Caregiver if Needed significant other    Quality of Family Relationships supportive;helpful;involved    Able to Return to Prior Arrangements yes       Resource/Environmental Concerns    Resource/Environmental Concerns none       Transition Planning    Patient/Family Anticipates Transition to home with family    Transportation Anticipated family or friend will provide       Discharge Needs Assessment    Readmission Within the Last 30 Days no previous admission in last 30 days    Equipment Currently Used at Home cane, straight    Concerns to be Addressed no discharge needs identified;denies needs/concerns at this time    Anticipated Changes Related to Illness none    Equipment Needed After Discharge none               Discharge Plan     Row Name 06/06/22 1610       Plan    Plan Home with OP PT    Plan Comments CCP met with patient and patient’s significant other, Jd Kellogg at bedside. Patient gave CCP permission to discuss d/c plans with visitor present. Face sheet verified and IMM noted. Patient lives with her son and two grandchildren. Patient has 4 steps to the entrance of her home. Patient’s bedroom and bathroom are on the main level. Patient uses a cane and has a shower chair. Patient has not used home health or been to SNF. Patient declines home health and SNF. Patient prefers to do OP PT. Patient plans to return home at discharge and does not feel like a walker is needed. CCP spoke with Sonia/ZACARIAS for OP PT order. CCP will follow for any needs to arise.  Carmella BA              Continued Care and Services - Admitted Since 6/4/2022    Coordination has not been started for this encounter.          Demographic Summary     Row Name 06/06/22 1609       General Information    Admission Type inpatient    Arrived From emergency department    Required Notices Provided Important Message from Medicare    Referral Source admission list    Reason for Consult discharge planning    Preferred Language English               Functional Status     Row Name 06/06/22 1609       Functional Status    Usual Activity Tolerance good    Current Activity Tolerance good       Functional Status, IADL    Medications assistive equipment    Meal Preparation assistive equipment    Housekeeping assistive equipment    Laundry assistive equipment    Shopping assistive equipment       Mental Status Summary    Recent Changes in Mental Status/Cognitive Functioning no changes               Psychosocial    No documentation.                Abuse/Neglect    No documentation.                Legal    No documentation.                Substance Abuse    No documentation.                Patient Forms    No documentation.                   MEJIA Lentz

## 2022-06-06 NOTE — PLAN OF CARE
Goal Outcome Evaluation:  Plan of Care Reviewed With: patient           Outcome Evaluation: Patient was alert and oriented today. Patient agreeable to PT. At baseline patient lives with significant other in an RV and uses a straight point cane for ambulation. Patient reports they are currently staying at [facility] for the summer.  Patient was [facility] upon arrival today. Patient completed a sit to stand transfer with SBA. Patient ambulated 100ft with Rwx and CGA today. Distance limited due to feeling fatigued. Patient demonstrates generalized LE weakness and decreased activity endurance. Patient would benefit from skilled PT to address deficits. At this time PT recommends home with assist homehealth PT vs. Outpatient per patient request.  Will continue to monitor.

## 2022-06-06 NOTE — PLAN OF CARE
Problem: Adult Inpatient Plan of Care  Goal: Plan of Care Review  Outcome: Ongoing, Progressing  Flowsheets (Taken 6/6/2022 1503)  Progress: no change  Plan of Care Reviewed With: patient  Outcome Evaluation: vss. ivf infusing per order. c/o pain decreased with prn medication. up with assist x1.   Goal Outcome Evaluation:  Plan of Care Reviewed With: patient        Progress: no change  Outcome Evaluation: vss. ivf infusing per order. c/o pain decreased with prn medication. up with assist x1.

## 2022-06-06 NOTE — PLAN OF CARE
Goal Outcome Evaluation:   VSS. A&OX4 with some forgetfulness. Pain managed with PRN pain medication. Pt upset regarding medication changes, ambien d/c'd on admission and melatonin started. Educated pt on reasons for medication adjustments. Continues on IV abt. Up with assist x1 to bathroom. Call light in reach.

## 2022-06-07 ENCOUNTER — READMISSION MANAGEMENT (OUTPATIENT)
Dept: CALL CENTER | Facility: HOSPITAL | Age: 62
End: 2022-06-07

## 2022-06-07 VITALS
DIASTOLIC BLOOD PRESSURE: 94 MMHG | BODY MASS INDEX: 37.93 KG/M2 | HEART RATE: 109 BPM | TEMPERATURE: 97.8 F | OXYGEN SATURATION: 98 % | WEIGHT: 227.96 LBS | SYSTOLIC BLOOD PRESSURE: 148 MMHG | RESPIRATION RATE: 18 BRPM

## 2022-06-07 PROBLEM — M62.82 RHABDOMYOLYSIS: Status: RESOLVED | Noted: 2022-06-06 | Resolved: 2022-06-07

## 2022-06-07 PROBLEM — D63.8 ANEMIA, CHRONIC DISEASE: Status: RESOLVED | Noted: 2022-06-05 | Resolved: 2022-06-07
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
## 2022-06-07 PROBLEM — R29.6 FALLS: Status: RESOLVED | Noted: 2022-06-05 | Resolved: 2022-06-07

## 2022-06-07 PROBLEM — R79.89 ELEVATED LIVER FUNCTION TESTS: Status: RESOLVED | Noted: 2022-06-05 | Resolved: 2022-06-07

## 2022-06-07 PROBLEM — G93.41 METABOLIC ENCEPHALOPATHY: Status: RESOLVED | Noted: 2022-06-04 | Resolved: 2022-06-07

## 2022-06-07 PROBLEM — E86.0 DEHYDRATION: Status: RESOLVED | Noted: 2022-06-05 | Resolved: 2022-06-07

## 2022-06-07 PROBLEM — E87.1 HYPONATREMIA: Status: RESOLVED | Noted: 2022-06-05 | Resolved: 2022-06-07

## 2022-06-07 LAB
ALBUMIN SERPL-MCNC: 3.4 G/DL (ref 3.5–5.2)
ALBUMIN/GLOB SERPL: 1 G/DL
ALP SERPL-CCNC: 108 U/L (ref 39–117)
ALT SERPL W P-5'-P-CCNC: 28 U/L (ref 1–33)
ANION GAP SERPL CALCULATED.3IONS-SCNC: 14.2 MMOL/L (ref 5–15)
AST SERPL-CCNC: 25 U/L (ref 1–32)
BASOPHILS # BLD AUTO: 0.06 10*3/MM3 (ref 0–0.2)
BASOPHILS NFR BLD AUTO: 0.7 % (ref 0–1.5)
BILIRUB SERPL-MCNC: 0.3 MG/DL (ref 0–1.2)
BUN SERPL-MCNC: 12 MG/DL (ref 8–23)
BUN/CREAT SERPL: 10.6 (ref 7–25)
CALCIUM SPEC-SCNC: 8.9 MG/DL (ref 8.6–10.5)
CHLORIDE SERPL-SCNC: 101 MMOL/L (ref 98–107)
CK SERPL-CCNC: 113 U/L (ref 20–180)
CO2 SERPL-SCNC: 24.8 MMOL/L (ref 22–29)
CREAT SERPL-MCNC: 1.13 MG/DL (ref 0.57–1)
DEPRECATED RDW RBC AUTO: 43.3 FL (ref 37–54)
EGFRCR SERPLBLD CKD-EPI 2021: 55.8 ML/MIN/1.73
EOSINOPHIL # BLD AUTO: 0.49 10*3/MM3 (ref 0–0.4)
EOSINOPHIL NFR BLD AUTO: 5.9 % (ref 0.3–6.2)
ERYTHROCYTE [DISTWIDTH] IN BLOOD BY AUTOMATED COUNT: 13.4 % (ref 12.3–15.4)
GLOBULIN UR ELPH-MCNC: 3.5 GM/DL
GLUCOSE SERPL-MCNC: 99 MG/DL (ref 65–99)
HCT VFR BLD AUTO: 37.5 % (ref 34–46.6)
HGB BLD-MCNC: 12.5 G/DL (ref 12–15.9)
IMM GRANULOCYTES # BLD AUTO: 0.37 10*3/MM3 (ref 0–0.05)
IMM GRANULOCYTES NFR BLD AUTO: 4.5 % (ref 0–0.5)
LYMPHOCYTES # BLD AUTO: 2.06 10*3/MM3 (ref 0.7–3.1)
LYMPHOCYTES NFR BLD AUTO: 24.8 % (ref 19.6–45.3)
MAGNESIUM SERPL-MCNC: 2 MG/DL (ref 1.6–2.4)
MCH RBC QN AUTO: 29.6 PG (ref 26.6–33)
MCHC RBC AUTO-ENTMCNC: 33.3 G/DL (ref 31.5–35.7)
MCV RBC AUTO: 88.9 FL (ref 79–97)
MONOCYTES # BLD AUTO: 1.05 10*3/MM3 (ref 0.1–0.9)
MONOCYTES NFR BLD AUTO: 12.7 % (ref 5–12)
NEUTROPHILS NFR BLD AUTO: 4.26 10*3/MM3 (ref 1.7–7)
NEUTROPHILS NFR BLD AUTO: 51.4 % (ref 42.7–76)
NRBC BLD AUTO-RTO: 0 /100 WBC (ref 0–0.2)
PLATELET # BLD AUTO: 444 10*3/MM3 (ref 140–450)
PMV BLD AUTO: 8.3 FL (ref 6–12)
POTASSIUM SERPL-SCNC: 3.1 MMOL/L (ref 3.5–5.2)
PROT SERPL-MCNC: 6.9 G/DL (ref 6–8.5)
RBC # BLD AUTO: 4.22 10*6/MM3 (ref 3.77–5.28)
SODIUM SERPL-SCNC: 140 MMOL/L (ref 136–145)
WBC NRBC COR # BLD: 8.29 10*3/MM3 (ref 3.4–10.8)

## 2022-06-07 PROCEDURE — G0378 HOSPITAL OBSERVATION PER HR: HCPCS

## 2022-06-07 PROCEDURE — 85025 COMPLETE CBC W/AUTO DIFF WBC: CPT | Performed by: INTERNAL MEDICINE

## 2022-06-07 PROCEDURE — 25010000002 SODIUM CHLORIDE 0.9 % WITH KCL 20 MEQ 20-0.9 MEQ/L-% SOLUTION: Performed by: INTERNAL MEDICINE

## 2022-06-07 PROCEDURE — 82550 ASSAY OF CK (CPK): CPT | Performed by: INTERNAL MEDICINE

## 2022-06-07 PROCEDURE — 83735 ASSAY OF MAGNESIUM: CPT | Performed by: INTERNAL MEDICINE

## 2022-06-07 PROCEDURE — 80053 COMPREHEN METABOLIC PANEL: CPT | Performed by: INTERNAL MEDICINE

## 2022-06-07 PROCEDURE — 97165 OT EVAL LOW COMPLEX 30 MIN: CPT

## 2022-06-07 RX ORDER — TIZANIDINE 4 MG/1
4 TABLET ORAL EVERY 8 HOURS PRN
Qty: 90 TABLET | Refills: 0 | Status: SHIPPED | OUTPATIENT
Start: 2022-06-07 | End: 2022-06-13

## 2022-06-07 RX ORDER — OXYCODONE HYDROCHLORIDE AND ACETAMINOPHEN 5; 325 MG/1; MG/1
1 TABLET ORAL EVERY 6 HOURS PRN
Qty: 10 TABLET | Refills: 0 | Status: SHIPPED | OUTPATIENT
Start: 2022-06-07 | End: 2022-06-12

## 2022-06-07 RX ORDER — AMOXICILLIN AND CLAVULANATE POTASSIUM 875; 125 MG/1; MG/1
1 TABLET, FILM COATED ORAL 2 TIMES DAILY
Qty: 10 TABLET | Refills: 0 | Status: SHIPPED | OUTPATIENT
Start: 2022-06-07 | End: 2022-06-13

## 2022-06-07 RX ORDER — CHOLECALCIFEROL (VITAMIN D3) 125 MCG
5 CAPSULE ORAL NIGHTLY
Qty: 30 TABLET | Refills: 3 | Status: SHIPPED | OUTPATIENT
Start: 2022-06-07 | End: 2022-06-13

## 2022-06-07 RX ORDER — POTASSIUM CHLORIDE 750 MG/1
10 TABLET, FILM COATED, EXTENDED RELEASE ORAL DAILY
Qty: 30 TABLET | Refills: 0 | Status: SHIPPED | OUTPATIENT
Start: 2022-06-07 | End: 2023-03-16

## 2022-06-07 RX ORDER — ACETAMINOPHEN 325 MG/1
650 TABLET ORAL EVERY 4 HOURS PRN
Qty: 120 TABLET | Refills: 3 | Status: SHIPPED | OUTPATIENT
Start: 2022-06-07 | End: 2022-06-13

## 2022-06-07 RX ORDER — GUAIFENESIN 600 MG/1
600 TABLET, EXTENDED RELEASE ORAL EVERY 12 HOURS SCHEDULED
Qty: 30 TABLET | Refills: 0 | Status: SHIPPED | OUTPATIENT
Start: 2022-06-07 | End: 2022-06-13

## 2022-06-07 RX ORDER — NYSTATIN 100000 [USP'U]/G
POWDER TOPICAL EVERY 12 HOURS SCHEDULED
Qty: 1 EACH | Refills: 0 | Status: SHIPPED | OUTPATIENT
Start: 2022-06-07 | End: 2022-06-20

## 2022-06-07 RX ADMIN — OXYCODONE AND ACETAMINOPHEN 1 TABLET: 5; 325 TABLET ORAL at 05:42

## 2022-06-07 RX ADMIN — SUCRALFATE 1 G: 1 TABLET ORAL at 09:17

## 2022-06-07 RX ADMIN — SODIUM CHLORIDE AND POTASSIUM CHLORIDE 100 ML/HR: .9; .15 SOLUTION INTRAVENOUS at 05:38

## 2022-06-07 RX ADMIN — NYSTATIN: 100000 POWDER TOPICAL at 09:17

## 2022-06-07 RX ADMIN — LEVOTHYROXINE SODIUM 125 MCG: 0.12 TABLET ORAL at 09:17

## 2022-06-07 RX ADMIN — ISOSORBIDE MONONITRATE 60 MG: 60 TABLET ORAL at 09:17

## 2022-06-07 RX ADMIN — GUAIFENESIN 600 MG: 600 TABLET, EXTENDED RELEASE ORAL at 09:16

## 2022-06-07 RX ADMIN — METOPROLOL SUCCINATE 50 MG: 50 TABLET, EXTENDED RELEASE ORAL at 09:16

## 2022-06-07 RX ADMIN — HYDRALAZINE HYDROCHLORIDE 25 MG: 25 TABLET, FILM COATED ORAL at 09:17

## 2022-06-07 RX ADMIN — FAMOTIDINE 20 MG: 20 TABLET ORAL at 09:16

## 2022-06-07 RX ADMIN — POTASSIUM CHLORIDE 40 MEQ: 750 TABLET, EXTENDED RELEASE ORAL at 09:16

## 2022-06-07 RX ADMIN — ASPIRIN 81 MG: 81 TABLET, COATED ORAL at 09:17

## 2022-06-07 NOTE — PLAN OF CARE
Goal Outcome Evaluation:   Vitals stable. Potassium replaced with x1 dose per protocol. OK for d/c per Dr Almanza. Discharge orders placed. OT consult completed. Pt's family to transport home.

## 2022-06-07 NOTE — PLAN OF CARE
Pt admitted to [facility] 2/2 to AMS - pt diagnosed with possible drug overdose, metabolic encephalopathy, and PNA. She is a retired PT and has been living in a  in [place] up until recently. She is now staying with her sons and reports independence at baseline using a single point cane. Today, the pt was oriented and able to participate in OOB transfers/ADL's with no hands on assist. She reports she is back to her baseline and has d/c orders placed. She is requesting OP PT.     Patient was not wearing a face mask during this therapy encounter. Therapist used appropriate personal protective equipment including mask and gloves. Mask used was standard procedure mask. Appropriate PPE was worn during the entire therapy session. Hand hygiene was completed before and after therapy session. Patient is not in enhanced droplet precautions.

## 2022-06-07 NOTE — PLAN OF CARE
Goal Outcome Evaluation:   VSS. A&OX4. Pain managed with PRN pain medications. Continues with IVF. Up with assist x1. Purewick added overnight per pt request d/t frequent urination. Bed alarm on and functioning, call light in reach.

## 2022-06-07 NOTE — DISCHARGE SUMMARY
Patient Name: Sandra Kellogg  : 1960  MRN: [id number]    Date of Admission: 2022  Date of Discharge:  2022  Primary Care Physician: Jh Muniz MD      Discharge Diagnoses     Active Hospital Problems    Diagnosis  POA   • **Multifocal pneumonia [J18.9]  Yes   • Weakness [R53.1]  Yes   • Hyperlipidemia [E78.5]  Yes   • Hypokalemia [E87.6]  Yes   • Chronic back pain [M54.9, G89.29]  Yes   • CKD (chronic kidney disease) [N18.9]  Yes   • Essential hypertension [I10]  Yes      Resolved Hospital Problems    Diagnosis Date Resolved POA   • Rhabdomyolysis [M62.82] 2022 Yes   • Falls [W19.XXXA] 2022 Yes   • Hyponatremia [E87.1] 2022 Yes   • Dehydration [E86.0] 2022 Yes   • Anemia, chronic disease [D63.8] 2022 Yes   • Elevated liver function tests [R79.89] 2022 Yes   • Metabolic encephalopathy [G93.41] 2022 Yes        Hospital Course     Brief admission history and physical.  Please refer to the H&P for full details.  A pleasant 61 years old white female with a past history of abdominal aortic aneurysm/MI/anxiety/stage IIIb chronic renal failure/hypertension/hyperlipidemia/anxiety and depression/chronic low back pain syndrome who presented to the emergency department with altered mental status and confusion associated with weakness/fatigue/myalgia/sore throat/cough/shortness of breath/polyuria/dizziness/behavioral problems/decreased concentration/anxiety.  It is worth mentioning that she is on narcotic pain medication/Hypnotic/muscle relaxant/Atarax.  Physical examination admission included a temperature of 98.7 a pulse of 70 respiratory rate of 20 and blood pressure 120/70 O2 sats of 92% on 2 L the rest of the examination is remarkable for obesity/well-appearing female/pharyngeal erythema/myalgia/poor bilateral air entry in both chest/skin bruising/disorientation/anxiety/impaired memory.  Hospital course.  Initial ER evaluation included a CMP that was normal  "except a random blood sugar of 125, creatinine 1.62, sodium 134, potassium 2.5, chloride 92, AST 36, alkaline phosphatase 122.  Ethanol level was negative.  Acetaminophen level was below normal.  CBC was normal except a white count of 14.2.  UA suggestive of UTI.  Urine tox screen positive for amphetamines/THC/oxycodone.  Respiratory PCR panel was negative including COVID.  Lactic acid was normal.  Arterial blood gas revealed a pH of 7.5 PCO2 of 28 PO2 of 70..  Chest x-ray revealed multifocal areas of pneumonia.  CT scan of the brain without contrast revealing no acute disease.  EKG revealed normal sinus rhythm with left bundle branch block.  Patient was admitted with metabolic encephalopathy/weakness/myalgia/multiple falls/dizziness that was believed to be multifactorial secondary to pneumonia/UTI/electrolyte imbalance/dehydration/illicit drug use/side effects of medication.  See care was checked and was elevated adding rhabdo to the above diagnosis.  CNS examination remained without focal deficit throughout the hospital stay.  CT scan of the brain without acute event.  She was treated for the above condition as below and her metabolic encephalopathy/myalgia/falls/dizziness all has resolved.  Weakness greatly improved.  Physical therapy saw the patient and recommended outpatient physical therapy.  As far as her bilateral multifocal pneumonia her respiratory status improved blood cultures were negative by the time of discharge.  Respiratory PCR panel including COVID was negative.  Urine Legionella and Streptococcus antigen were negative.  He was unable to produce sputum for studies.  Repeat of the blood proBNP \".  He was placed on IV Rocephin/incentive spirometry/Mucinex/oxygen/Zithromax.  Zithromax was DC'd after Legionella urine antigen came back negative.  She was maintained on Rocephin.  She did not have temperature spikes during her hospital stay.  Respiratory status improved and she was weaned off oxygen.  " There was a suspicion of a UTI however repeat UA was negative.  She has a history of anxiety/depression/chronic pain syndrome and her drug screen was positive the patient has self has denied any illicit drug use.  [facility] center saw the patient and she has refused any resources.  We continued her Cymbalta and Lamictal.  We decreased the dose of Percocet and to have an Ambien was stopped.  Melatonin was initiated.  We changed Zanaflex to as needed.  I have spent a long time counseling the patient about the use of narcotics and sedating medication and benzos and their side effect leading to the metabolic encephalopathy and she agrees on the above plan of stopping and decreasing some of those.  As far as her stage III chronic renal failure/hyponatremia/hypokalemia/hyponatremia/hypomagnesemia and dehydration.  Patient was started on IV fluid.  Her potassium has been replaced and is still being replaced.  Her dehydration, hyponatremia, hypermagnesemia has all resolved.  Her kidney function has improved from stage IIIb to stage IIIa at the time of discharge.  Potassium was prescribed at the time of discharge and electrolytes and kidney function should be followed up as an outpatient.  As far as her history of coronary artery disease and hypertension there was no clinical evidence of angina or congestive heart failure during the hospital stay.  Patient was continued on aspirin/hydralazine/Toprol/Imdur and Norvasc was stopped to avoid hypotension her blood pressure was acceptable at the time of discharge and this needs to be followed up as an outpatient.  She has had anemia of chronic disease history her hemoglobin is between 10 and 12 hemoglobin remained about the baseline during this admission.  She does have a history of hypothyroidism and she remained clinically euthyroid during this admission.  We continued her current replacement.  TSH was normal during this admission.  She was noted to have an elevated liver function  test during this admission that has spontaneously resolved.  She had a benign GI examination.  This was most likely secondary to the rhabdo.  During the hospital stay she remained on DVT prophylaxis on Lovenox.  At the time of discharge she was alert and oriented x3 ambulating and hemodynamically stable without fever.  She is to follow-up with her primary care physician in 1 week/cardiology as scheduled/nephrology as scheduled her abdominal aortic aneurysm is being followed by her primary MD.  She is referred to outpatient physical therapy    Consultants     Consult Orders (all) (From admission, onward)     Start     Ordered    06/05/22 1127  Inpatient Access Center Consult  Once        Provider:  (Not yet assigned)    06/05/22 1129    06/05/22 0540  Inpatient Case Management  Consult  Once        Provider:  (Not yet assigned)    06/05/22 0540    06/04/22 1813  [facility] (on-call MD unless specified) Details  Once        Specialty:  Hospitalist  Provider:  Cristino Montenegro MD    06/04/22 1812              Procedures     Imaging Results (All)     Procedure Component Value Units Date/Time    CT Head Without Contrast [[id number]] Collected: 06/04/22 1754     Updated: 06/04/22 2139    Narrative:      CT HEAD WITHOUT CONTRAST     CLINICAL HISTORY: Altered mental status. Patient has fallen twice  yesterday and once today.     TECHNIQUE: CT scan of the head was obtained with 3 mm axial soft tissue  algorithm and 2 mm bone algorithm images. No intravenous contrast was  administered. Sagittal and coronal reconstructions were obtained.     COMPARISON: No previous similar studies are available for comparison.     FINDINGS:       There is no evidence for a calvarial fracture. There is no evidence for  an acute extra-axial hemorrhage.     The ventricles, sulci, and cisterns are age appropriate. The basal  ganglia and thalami are unremarkable in appearance. The posterior fossa  structures are within normal limits.     There  is near complete opacification of the right maxillary sinus.  Mucosal thickening is identified within the frontal sinus and the  ethmoid air cells.       Impression:         No evidence for acute traumatic intracranial pathology.     Moderate changes of inflammatory paranasal sinus disease are  incidentally noted.        Radiation dose reduction techniques were utilized, including automated  exposure control and exposure modulation based on body size.     This report was finalized on 6/4/2022 9:36 PM by Dr. Julio Love M.D.       XR Chest 1 View [[id number]] Collected: 06/04/22 1758     Updated: 06/04/22 2128    Narrative:      PORTABLE RADIOGRAPHIC VIEW OF THE CHEST      CLINICAL HISTORY: Weakness.     FINDINGS:      Stat portable radiographic view of the chest demonstrates multifocal  areas of infiltrate most suggestive of multifocal pneumonia. These  abnormalities are seen within the left base, as well as the right mid  and upper lung zones. Overall, lung volumes are relatively low. The  cardiomediastinal silhouette is prominent in size, likely accentuated by  the low lung volumes. The osseous structures are unremarkable.       Impression:         Findings most compatible with multifocal areas of pneumonia.     These findings were discussed with Dr. Kirkpatrick on 06/04/2022 at  approximately 5:55 PM.     This report was finalized on 6/4/2022 9:25 PM by Dr. Julio Love M.D.             Pertinent Labs     Results from last 7 days   Lab Units 06/07/22  0618 06/06/22  0655 06/05/22  0513 06/04/22  1704   WBC 10*3/mm3 8.29 6.97 10.77 14.28*   HEMOGLOBIN g/dL 12.5 12.8 11.0* 12.2   PLATELETS 10*3/mm3 444 366 309 307     Results from last 7 days   Lab Units 06/07/22  0618 06/06/22  0655 06/05/22  1013 06/05/22  0513 06/04/22  1704   SODIUM mmol/L 140 142  --  137 134*   POTASSIUM mmol/L 3.1* 3.3* 3.6 3.3* 2.5*   CHLORIDE mmol/L 101 104  --  101 92*   CO2 mmol/L 24.8 23.8  --  23.0 27.5   BUN mg/dL 12 12  --  15 16    CREATININE mg/dL 1.13* 1.35*  --  1.40* 1.62*   GLUCOSE mg/dL 99 104*  --  95 125*   Estimated Creatinine Clearance: 63 mL/min (A) (by C-G formula based on SCr of 1.13 mg/dL (H)).  Results from last 7 days   Lab Units 06/07/22  0618 06/06/22  0655 06/04/22  1704   ALBUMIN g/dL 3.40* 3.30* 3.50   BILIRUBIN mg/dL 0.3 0.3 0.8   ALK PHOS U/L 108 116 122*   AST (SGOT) U/L 25 40* 36*   ALT (SGPT) U/L 28 31 25     Results from last 7 days   Lab Units 06/07/22  0618 06/06/22  0655 06/05/22  1013 06/05/22  0513 06/04/22  1704   CALCIUM mg/dL 8.9 9.1  --  8.8 9.1   ALBUMIN g/dL 3.40* 3.30*  --   --  3.50   MAGNESIUM mg/dL 2.0  --  2.7*  --   --        Results from last 7 days   Lab Units 06/07/22  0618 06/06/22  0655 06/05/22  1013   CK TOTAL U/L 113 278* 564*           Invalid input(s): LDLCALC  Results from last 7 days   Lab Units 06/04/22  1840 06/04/22  1818   BLOODCX  No growth at 2 days No growth at 2 days     Imaging Results (Last 24 Hours)     ** No results found for the last 24 hours. **          Test Results Pending at Discharge     Pending Labs     Order Current Status    Blood Culture - Blood, Arm, Left Preliminary result    Blood Culture - Blood, Arm, Right Preliminary result            Discharge Exam   Physical Exam  Vitals.  Temperature 97.1 pulse of 109 respiratory rate of 18 and blood pressure 165/55 and O2 sats of 98% on room air  General.  Middle-aged female.  Obese.  Alert and oriented x3.  In no apparent pain/distress/diaphoresis.  Normal mood and affect.  Eyes.  Pupils equal round and reactive.  Intact extraocular musculature.  No pallor or jaundice.  Normal conjunctivae and lids.  Oral cavity.  Moist mucous membrane with no lesions.    Neck.  Supple.  No JVD.  No lymphadenopathy or thyromegaly.  Cardiovascular.  Regular rate and rhythm and grade 2 systolic murmur.  Chest.  Poor but clear to auscultation bilaterally with no added sounds.  Abdomen.  Soft lax.  No tenderness.  No organomegaly.  No  guarding or rebound.  Extremities.  No clubbing cyanosis or edema.  CNS.  No acute focal neurological deficits.  Discharge Details        Discharge Medications      New Medications      Instructions Start Date   acetaminophen 325 MG tablet  Commonly known as: TYLENOL   650 mg, Oral, Every 4 Hours PRN      amoxicillin-clavulanate 875-125 MG per tablet  Commonly known as: Augmentin   1 tablet, Oral, 2 Times Daily      guaiFENesin 600 MG 12 hr tablet  Commonly known as: MUCINEX   600 mg, Oral, Every 12 Hours Scheduled      melatonin 5 MG tablet tablet   5 mg, Oral, Nightly      nystatin 488163 UNIT/GM powder  Commonly known as: MYCOSTATIN   Topical, Every 12 Hours Scheduled      oxyCODONE-acetaminophen 5-325 MG per tablet  Commonly known as: PERCOCET  Replaces: oxyCODONE-acetaminophen  MG per tablet   1 tablet, Oral, Every 6 Hours PRN      potassium chloride 10 MEQ CR tablet   10 mEq, Oral, Daily         Changes to Medications      Instructions Start Date   metoprolol succinate XL 50 MG 24 hr tablet  Commonly known as: TOPROL-XL  What changed: Another medication with the same name was changed. Make sure you understand how and when to take each.   50 mg, Oral, Nightly      metoprolol succinate XL 50 MG 24 hr tablet  Commonly known as: TOPROL-XL  What changed:   · how much to take  · how to take this  · additional instructions   TAKE 1 TABLET EVERY DAY      tiZANidine 4 MG tablet  Commonly known as: ZANAFLEX  What changed:   · when to take this  · reasons to take this   4 mg, Oral, Every 8 Hours PRN         Continue These Medications      Instructions Start Date   aspirin 81 MG tablet   81 mg, Oral, Daily      DULoxetine 60 MG capsule  Commonly known as: CYMBALTA   TAKE 1 CAPSULE EVERY DAY      famotidine 20 MG tablet  Commonly known as: PEPCID   20 mg, Oral, 2 Times Daily      hydrALAZINE 25 MG tablet  Commonly known as: APRESOLINE   25 mg, Oral, 2 times daily      hydrOXYzine 25 MG tablet  Commonly known as:  ATARAX   1 tablet, Oral, Every 6 Hours PRN      isosorbide mononitrate 60 MG 24 hr tablet  Commonly known as: IMDUR   TAKE 1 TABLET EVERY DAY      lamoTRIgine 25 MG tablet  Commonly known as: LaMICtal   2 tabs po qhs      levothyroxine 125 MCG tablet  Commonly known as: SYNTHROID, LEVOTHROID   125 mcg, Oral, Daily      nitroglycerin 0.4 MG SL tablet  Commonly known as: NITROSTAT   0.4 mg, Sublingual      OMEGA-3 FISH OIL PO   1 tablet, Oral, Daily      oxybutynin 5 MG tablet  Commonly known as: DITROPAN   TAKE 1 TABLET TWICE DAILY      sucralfate 1 g tablet  Commonly known as: CARAFATE   TAKE 1 TABLET THREE TIMES DAILY WITH MEALS      Vitamin D3 1.25 MG (68735 UT) capsule   5,000 Int'l Units         Stop These Medications    amLODIPine 10 MG tablet  Commonly known as: NORVASC     amoxicillin 500 MG capsule  Commonly known as: AMOXIL     oxyCODONE-acetaminophen  MG per tablet  Commonly known as: PERCOCET  Replaced by: oxyCODONE-acetaminophen 5-325 MG per tablet     promethazine 25 MG tablet  Commonly known as: PHENERGAN     rosuvastatin 40 MG tablet  Commonly known as: CRESTOR     zolpidem 10 MG tablet  Commonly known as: AMBIEN            Allergies   Allergen Reactions   • Metformin Urinary Retention     Kidney failure     • Clindamycin/Lincomycin GI Intolerance         Discharge Disposition:  Condition: Stable    Diet:   Diet Order   Procedures   • Diet Regular; Cardiac   /Renal    Activity:   Activity Instructions     Activity as Tolerated      Other Activity Instructions      Activity Instructions: Outpatient PT to evaluate and treat              CODE STATUS:    Code Status and Medical Interventions:   Ordered at: 06/04/22 7259     Level Of Support Discussed With:    Patient     Code Status (Patient has no pulse and is not breathing):    CPR (Attempt to Resuscitate)     Medical Interventions (Patient has pulse or is breathing):    Full       No future appointments.  Additional Instructions for the  Follow-ups that You Need to Schedule     Ambulatory Referral to Physical Therapy Evaluate and treat   As directed      Specialty needed: Evaluate and treat         Call MD With Problems / Concerns   As directed      Instructions: Call MD or return to ER if chest pain/shortness of breath/dizziness/confusion/focal neurological deficit/seizures/dizziness/loss of consciousness/fever or chills/cough/nausea or vomiting    Order Comments: Instructions: Call MD or return to ER if chest pain/shortness of breath/dizziness/confusion/focal neurological deficit/seizures/dizziness/loss of consciousness/fever or chills/cough/nausea or vomiting          Discharge Follow-up with PCP   As directed       Currently Documented PCP:    Jh Muniz MD    PCP Phone Number:    [phone]     Follow Up Details: Primary MD.  1 week.  Pneumonia.  Metabolic encephalopathy.  Weakness.  Rhabdomyolysis.  Pneumonia.  Anxiety.  Depression.  Chronic pain.  Stage III chronic renal failure.  Electrolyte imbalance.  AAA.  Coronary artery disease.  Hypertension.         Discharge Follow-up with Specified Provider: Cardiology.  As scheduled.   As directed      To: Cardiology.  As scheduled.    Follow Up Details: Coronary artery disease/hypertension.         Discharge Follow-up with Specified Provider: Nephrology.  As scheduled.   As directed      To: Nephrology.  As scheduled.    Follow Up Details: Stage III chronic renal failure.            Follow-up Information     Jh Muniz MD .    Specialty: Family Medicine  Why: Primary MD.  1 week.  Pneumonia.  Metabolic encephalopathy.  Weakness.  Rhabdomyolysis.  Pneumonia.  Anxiety.  Depression.  Chronic pain.  Stage III chronic renal failure.  Electrolyte imbalance.  AAA.  Coronary artery disease.  Hypertension.  Contact information:  [address]  [place] [address]  [phone]                           Time Spent on Discharge:  Greater than 30 minutes      Emily Almanza  MD [facility]  06/07/22  08:11 EDT

## 2022-06-07 NOTE — OUTREACH NOTE
Prep Survey    Flowsheet Row Responses   [facility] patient discharged from? [place]   Is LACE score < 7 ? No   Emergency Room discharge w/ pulse ox? No   Eligibility [facility]   Date of Admission 06/04/22   Date of Discharge 06/07/22   Discharge Disposition Home or Self Care   Discharge diagnosis Multifocal pneumonia    Does the patient have one of the following disease processes/diagnoses(primary or secondary)? COPD/Pneumonia   Does the patient have Home health ordered? No   Is there a DME ordered? No   Prep survey completed? Yes          DANI GUERRA - Registered Nurse

## 2022-06-07 NOTE — THERAPY DISCHARGE NOTE
Acute Care - Occupational Therapy Discharge  [facility]    Patient Name: Sandra Kellogg  : 1960    MRN: [id number]                              Today's Date: 2022       Admit Date: 2022    Visit Dx:     ICD-10-CM ICD-9-CM   1. Multifocal pneumonia  J18.9 486   2. Altered mental status, unspecified altered mental status type  R41.82 780.97   3. Hypokalemia  E87.6 276.8   4. Chronic pain syndrome  G89.4 338.4   5. Non-traumatic rhabdomyolysis  M62.82 728.88   6. Fall, initial encounter  W19.XXXA E888.9     Patient Active Problem List   Diagnosis   • Impingement syndrome of right shoulder   • Osteoarthritis of right hip   • Osteoarthritis of right knee   • Primary osteoarthritis of left hip   • Renal insufficiency   • Trochanteric bursitis of left hip   • Trochanteric bursitis of right hip   • Essential hypertension   • Hyperlipidemia   • Acquired hypothyroidism   • Myocardial infarction (HCC)   • Arthritis of left knee   • Status post total left knee replacement   • Depression   • Urge incontinence of urine   • Dehydration   • Nausea vomiting and diarrhea   • Polypharmacy   • Duodenitis   • Adenomatous colon polyp   • CKD (chronic kidney disease)   • Hyperkalemia   • Radial head fracture   • Hypocortisolemia (HCC)   • Severe sepsis with acute organ dysfunction (HCC)   • Abdominal aortic aneurysm (AAA) without rupture (HCC)   • Constipation   • Pelvic floor dysfunction in female   • Stented coronary artery   • Acute renal failure (HCC)   • Chronic kidney disease   • Arteriosclerosis of coronary artery   • Chronic back pain   • Multifocal pneumonia   • Hypokalemia   • Weakness   • Hyperlipidemia     Past Medical History:   Diagnosis Date   • Abdominal aortic aneurysm (AAA) without rupture (HCC) 9/15/2020   • Abnormal CT scan of lung 2019    DONE AT [facility]   • Achilles tendinitis of both lower extremities 10/2016   • Acquired hypothyroidism 2017   • Anxiety    • Baker's cyst    • Bilateral lower  leg cellulitis 10/21/2016    SEEN AT [facility] ER   • C. difficile colitis 08/2020   • CAD (coronary artery disease)    • Chronic pain 1/24/2019   • CKD (chronic kidney disease) 2/28/2019    STAGE 3, FOLLOWED BY DR. ROBERT LR   • Colon polyps     Followed by Dr. Robert Trent   • Constipation    • Cystitis 03/25/2019   • DDD (degenerative disc disease), lumbar    • Dehydration 10/2020   • Dehydration 01/23/2019    WITH CATHERINE, ADMITTED TO [facility]   • Depression    • Duodenitis 05/2020   • Erosive gastritis 03/2019   • GERD (gastroesophageal reflux disease)    • GI (gastrointestinal bleed)    • GI hemorrhage 09/2020   • H. pylori infection    • Hyperkalemia 3/1/2019   • Hyperlipidemia     MIXED   • Hypertension    • Hypocortisolemia ([facility]) 8/23/2020   • Impingement syndrome of left shoulder 02/2019   • Impingement syndrome of right shoulder 7/23/2013   • Insomnia    • Insulin resistance    • Metabolic syndrome    • Metabolic syndrome    • Microhematuria    • Myocardial infarction ([facility]) 11/24/2005    PTCA WITH STENT X3, ADMITTED TO [place]   • Osteoarthritis     MULTIPLE SITES   • Post-tussive emesis 08/2019   • Pulmonary nodule 08/2019   • Radial head fracture 08/08/2019    Closed nondisplaced fracture right radius   • Renal anomaly 11/29/2010    RENAL US: ASYMMETRY OF RENAL SIZE WITH THE RIGHT KIDNEY LARGER THAN THE LEFT. FETAL LOBULATION OR DROMEDARY HUMP ALONG THE ANTERIOR MID POLE OF THE LEFT KIDNEY. CORTICOL THINNING ON LEFT SIDE.   • Renal insufficiency    • Rotator cuff tendinitis, left 09/2019   • Rotator cuff tendinitis, right 12/2019   • Septic shock due to Clostridium difficile ([facility]) 08/14/2020    ADMITTED TO [place]   • Syncope and collapse 08/2020   • Tear of lateral meniscus of right knee 02/2016   • Trochanteric bursitis of right hip 6/4/2014   • Trochanteric bursitis, left hip 05/2019   • Urge incontinence of urine 10/2/2018   • Vitamin C deficiency 08/2020     Past Surgical History:   Procedure Laterality  Date   • ANORECTAL MANOMETRY N/A 1/14/2021    Procedure: ANORECTAL MANOMETRY;  Surgeon: Rubi May MD;  Location: [facility] ENDOSCOPY;  Service: General;  Laterality: N/A;  pre-constipation  pose-same   • CARPAL TUNNEL RELEASE WITH CUBITAL TUNNEL RELEASE Right 10/03/2017    WITH 1RST [facility] TRAPEZIUM RESECTION AND ARTHROPLASTY, DR. CATIA MALCOLM AT [facility]   • CARPAL TUNNEL RELEASE WITH CUBITAL TUNNEL RELEASE Left 12/26/2017    WITH 1RST [facility] ARTHROPLASTY AND LIGAMENT RECONSTRUCTION, DR. CATIA MALCOLM AT [facility]   • COLONOSCOPY N/A 1/27/2019    5 MM TUBULAR ADENOMA POLYP IN TRANSVERSE, 5 MM TUBULAR ADENOMA POLYP IN CECUM, 4 MM HYPERPLASTIC POLYP IN SIGMOID, RANDOM COLON BX BENIGN, DR. SIL GROVES AT [facility]   • CORONARY ANGIOPLASTY WITH STENT PLACEMENT  11/2005    3 STENTS, AT [place]   • CORONARY ANGIOPLASTY WITH STENT PLACEMENT  2004    1 STENT   • ENDOSCOPY N/A 1/27/2019    CHRONIC PEPTIC DUODENITIS, MILD CHRONIC GASTRITIS, NON SEVERE REFLUX ESOPHAGITIS, DR. SIL GROVES AT [facility]   • EXPLORATORY LAPAROTOMY N/A 08/15/2020    DR. JULIANNA SINHA AT [facility]   • KNEE ARTHROSCOPY W/ MENISCAL REPAIR Right 02/02/2016    PARTIAL LATERAL AND MEDIAL MENISCUS REPAIR, DR. RYLAN CHAVIS AT [facility]   • LAMINECTOMY AND MICRODISCECTOMY LUMBAR SPINE N/A 1995    L4-L5   • LUMBAR EPIDURAL INJECTION N/A 02/14/2019    DR. ANDREW OSPINA AT [facility]   • LUMBAR EPIDURAL INJECTION N/A 03/28/2019    DR. ANDREW OSPINA AT [facility]   • LUMBAR EPIDURAL INJECTION N/A 11/05/2020    DR. ANDREW OSPINA AT [facility]   • TOTAL KNEE ARTHROPLASTY Left 11/01/2017    DR. RYLAN CHAVIS AT [place]   • TOTAL KNEE ARTHROPLASTY Right 05/10/2017    DR. RYLAN CHAVIS AT [place] [facility]   • TUBAL ABDOMINAL LIGATION Bilateral    • ULNAR NERVE DECOMPRESSION Left       General Information     Row Name 06/07/22 1048          OT Time and Intention    Document Type discharge evaluation/summary  -RB     Mode of Treatment individual therapy;occupational therapy   -RB     Row Name 06/07/22 1048          General Information    Patient Profile Reviewed yes  -RB     Prior Level of Function independent:;ADL's;transfer  -RB     Existing Precautions/Restrictions fall  -RB     Barriers to Rehab none identified  -RB     Row Name 06/07/22 1048          Living Environment    People in Home child(omar), adult  -RB     Row Name 06/07/22 1048          Home Main Entrance    Number of Stairs, Main Entrance three  -RB     Row Name 06/07/22 1048          Cognition    Orientation Status (Cognition) oriented x 3  -RB           User Key  (r) = Recorded By, (t) = Taken By, (c) = Cosigned By    Initials Name Provider Type    RB Keri Ansari OT Occupational Therapist               Mobility/ADL's     Row Name 06/07/22 1049          Bed Mobility    Comment, (Bed Mobility) UIC  -RB     Row Name 06/07/22 1049          Transfers    Transfers sit-stand transfer;stand-sit transfer;toilet transfer  -RB     Bed-Chair [place] (Transfers) modified independence  -RB     Sit-Stand [place] (Transfers) modified independence  -RB     Stand-Sit [place] (Transfers) modified independence  -RB     [place] Level (Toilet Transfer) modified independence  -RB     Row Name 06/07/22 1049          Toilet Transfer    Type (Toilet Transfer) stand-sit;sit-stand  -RB     Row Name 06/07/22 1049          Functional Mobility    Functional Mobility- Ind. Level conditional independence  -RB     Row Name 06/07/22 1049          Activities of Daily Living    BADL Assessment/Intervention toileting;grooming  -RB     Row Name 06/07/22 1049          Toileting Assessment/Training    [place] Level (Toileting) toileting skills;modified independence  -RB     Position (Toileting) supported sitting;supported standing  -RB     Row Name 06/07/22 1049          Grooming Assessment/Training    [place] Level (Grooming) grooming skills;modified independence  -RB     Position (Grooming) sink side  -RB           User Key   (r) = Recorded By, (t) = Taken By, (c) = Cosigned By    Initials Name Provider Type    Keri Hauser OT Occupational Therapist               Obj/Interventions     Row Name 06/07/22 1049          Sensory Assessment (Somatosensory)    Sensory Assessment (Somatosensory) sensation intact  -RB     Row Name 06/07/22 1049          Vision Assessment/Intervention    Visual Impairment/Limitations WFL  -RB     Row Name 06/07/22 1049          Range of Motion Comprehensive    General Range of Motion no range of motion deficits identified  -RB     Row Name 06/07/22 1049          Strength Comprehensive (MMT)    Comment, General Manual Muscle Testing (MMT) Assessment Generalized weakness from her hospital stay. BUE appear WFL  -RB     Row Name 06/07/22 1049          Balance    Comment, Balance No LOB seen with mobility today.  -RB           User Key  (r) = Recorded By, (t) = Taken By, (c) = Cosigned By    Initials Name Provider Type    Keri Hauser OT Occupational Therapist               Goals/Plan     Row Name 06/07/22 1051          Bed Mobility Goal 1 (OT)    Activity/Assistive Device (Bed Mobility Goal 1, OT) bed mobility activities, all  -RB     [place] Level/Cues Needed (Bed Mobility Goal 1, OT) modified independence  -RB     Time Frame (Bed Mobility Goal 1, OT) short term goal (STG);2 weeks  -RB     Progress/Outcomes (Bed Mobility Goal 1, OT) goal met  -RB     Row Name 06/07/22 1051          Transfer Goal 1 (OT)    Activity/Assistive Device (Transfer Goal 1, OT) transfers, all  -RB     [place] Level/Cues Needed (Transfer Goal 1, OT) modified independence  -RB     Time Frame (Transfer Goal 1, OT) short term goal (STG);2 weeks  -RB     Progress/Outcome (Transfer Goal 1, OT) goal met  -RB     Row Name 06/07/22 1051          Dressing Goal 1 (OT)    Activity/Device (Dressing Goal 1, OT) dressing skills, all  -RB     [place]/Cues Needed (Dressing Goal 1, OT) modified independence  -RB     Time Frame  (Dressing Goal 1, OT) short term goal (STG);2 weeks  -RB     Progress/Outcome (Dressing Goal 1, OT) goal met  -RB     Row Name 06/07/22 1051          Grooming Goal 1 (OT)    Activity/Device (Grooming Goal 1, OT) grooming skills, all  -RB     [place] (Grooming Goal 1, OT) modified independence  -RB     Time Frame (Grooming Goal 1, OT) short term goal (STG);2 weeks  -RB     Progress/Outcome (Grooming Goal 1, OT) goal met  -RB     Row Name 06/07/22 1051          Therapy Assessment/Plan (OT)    Planned Therapy Interventions (OT) transfer/mobility retraining;BADL retraining;functional balance retraining;occupation/activity based interventions;patient/caregiver education/training  -RB           User Key  (r) = Recorded By, (t) = Taken By, (c) = Cosigned By    Initials Name Provider Type    RB Keri Ansari, MALU Occupational Therapist               Clinical Impression     Row Name 06/07/22 1050          Pain Assessment    Pretreatment Pain Rating 0/10 - no pain  -RB     Posttreatment Pain Rating 0/10 - no pain  -RB     Row Name 06/07/22 1050          Plan of Care Review    Plan of Care Reviewed With patient  -RB     Progress no change  -RB     Row Name 06/07/22 1050          Therapy Assessment/Plan (OT)    Criteria for Skilled Therapeutic Interventions Met (OT) no;no problems identified which require skilled intervention;other (see comments)  she has returned to her baseline performance.  -RB     Therapy Frequency (OT) evaluation only  -RB     Row Name 06/07/22 1050          Therapy Plan Review/Discharge Plan (OT)    Anticipated Discharge Disposition (OT) home with outpatient therapy services  -RB     Row Name 06/07/22 1050          Vital Signs    O2 Delivery Pre Treatment room air  -RB     O2 Delivery Intra Treatment room air  -RB     O2 Delivery Post Treatment room air  -RB     Pre Patient Position Sitting  -RB     Intra Patient Position Standing  -RB     Post Patient Position Sitting  -RB     Row Name 06/07/22  1050          Positioning and Restraints    Pre-Treatment Position sitting in chair/recliner  -RB     Post Treatment Position chair  -RB     In Chair notified nsg;reclined;sitting;call light within reach;encouraged to call for assist;exit alarm on  -RB           User Key  (r) = Recorded By, (t) = Taken By, (c) = Cosigned By    Initials Name Provider Type    Keri Hauser OT Occupational Therapist               Outcome Measures     Row Name 06/07/22 1051          How much help from another is currently needed...    Putting on and taking off regular lower body clothing? 4  -RB     Bathing (including washing, rinsing, and drying) 4  -RB     Toileting (which includes using toilet bed pan or urinal) 4  -RB     Putting on and taking off regular upper body clothing 4  -RB     Taking care of personal grooming (such as brushing teeth) 4  -RB     Eating meals 4  -RB     AM-PAC 6 Clicks Score (OT) 24  -RB     Row Name 06/07/22 1051          Modified [place] Scale    Modified Mario Scale 1 - No significant disability despite symptoms.  Able to carry out all usual duties and activities.  -RB     Row Name 06/07/22 1051          Functional Assessment    Outcome Measure Options AM-PAC 6 Clicks Daily Activity (OT);Modified Mario  -RB           User Key  (r) = Recorded By, (t) = Taken By, (c) = Cosigned By    Initials Name Provider Type    Keri Hauser OT Occupational Therapist              Occupational Therapy Education                 Title: PT OT SLP Therapies (In Progress)     Topic: Occupational Therapy (Not Started)     Point: ADL training (Not Started)     Description:   Instruct learner(s) on proper safety adaptation and remediation techniques during self care or transfers.   Instruct in proper use of assistive devices.              Learner Progress:  Not documented in this visit.          Point: Home exercise program (Not Started)     Description:   Instruct learner(s) on appropriate technique for  monitoring, assisting and/or progressing therapeutic exercises/activities.              Learner Progress:  Not documented in this visit.          Point: Precautions (Not Started)     Description:   Instruct learner(s) on prescribed precautions during self-care and functional transfers.              Learner Progress:  Not documented in this visit.          Point: Body mechanics (Not Started)     Description:   Instruct learner(s) on proper positioning and spine alignment during self-care, functional mobility activities and/or exercises.              Learner Progress:  Not documented in this visit.                          OT Recommendation and Plan  Planned Therapy Interventions (OT): transfer/mobility retraining, BADL retraining, functional balance retraining, occupation/activity based interventions, patient/caregiver education/training  Therapy Frequency (OT): evaluation only  Plan of Care Review  Plan of Care Reviewed With: patient  Progress: no change  Plan of Care Reviewed With: patient     Time Calculation:    Time Calculation- OT     Row Name 06/07/22 1048             Time Calculation- OT    OT Start Time 0930  -RB      OT Stop Time 0942  -RB      OT Time Calculation (min) 12 min  -RB      OT Received On 06/07/22  -RB              Untimed Charges    OT Eval/Re-eval Minutes 12  -RB              Total Minutes    Untimed Charges Total Minutes 12  -RB       Total Minutes 12  -RB            User Key  (r) = Recorded By, (t) = Taken By, (c) = Cosigned By    Initials Name Provider Type    RB Keri Ansari OT Occupational Therapist              Therapy Charges for Today     Code Description Service Date Service Provider Modifiers Qty    [id number]  OT EVAL LOW COMPLEXITY 2 6/7/2022 Keri Ansari OT GO 1             OT Discharge Summary  Anticipated Discharge Disposition (OT): home with outpatient therapy services    Keri Ansari OT  6/7/2022

## 2022-06-08 ENCOUNTER — TRANSITIONAL CARE MANAGEMENT TELEPHONE ENCOUNTER (OUTPATIENT)
Dept: CALL CENTER | Facility: HOSPITAL | Age: 62
End: 2022-06-08

## 2022-06-08 NOTE — OUTREACH NOTE
Call Center TCM Note    Flowsheet Row Responses   [facility] patient discharged from? [place]   Does the patient have one of the following disease processes/diagnoses(primary or secondary)? COPD/Pneumonia   Was the primary reason for admission: Pneumonia   TCM attempt successful? No  [No current verbal release at PCP office]   Unsuccessful attempts Attempt 2   Does the patient have a primary care provider?  Yes   Comments regarding PCP HOSP DC FU appt 6/13/22 @ 3:45 pm.           Chelo Villela RN    6/8/2022, 15:36 EDT

## 2022-06-08 NOTE — OUTREACH NOTE
Call Center TCM Note    Flowsheet Row Responses   [facility] [place] patient discharged from? [place]   Does the patient have one of the following disease processes/diagnoses(primary or secondary)? COPD/Pneumonia   Was the primary reason for admission: Pneumonia   TCM attempt successful? No   Unsuccessful attempts Attempt 1   Does the patient have a primary care provider?  Yes   Does the patient have an appointment with their PCP or specialist within 7 days of discharge? Yes   Comments regarding PCP HOSP DC FU appt 6/13/22 @ 3:45 pm.    Has the patient kept scheduled appointments due by today? N/A          Chelo Villela RN    6/8/2022, 13:23 EDT

## 2022-06-08 NOTE — CASE MANAGEMENT/SOCIAL WORK
Case Management Discharge Note      Final Note: Pt discharged home with outpt PT         Selected Continued Care - Discharged on 6/7/2022 Admission date: 6/4/2022 - Discharge disposition: Home or Self Care    Destination    No services have been selected for the patient.              Durable Medical Equipment    No services have been selected for the patient.              Dialysis/Infusion    No services have been selected for the patient.              Home Medical Care    No services have been selected for the patient.              Therapy    No services have been selected for the patient.              Community Resources    No services have been selected for the patient.              Community & DME    No services have been selected for the patient.                  Transportation Services  Private: Car    Final Discharge Disposition Code: 05 - cancer or [facility]

## 2022-06-09 ENCOUNTER — TRANSITIONAL CARE MANAGEMENT TELEPHONE ENCOUNTER (OUTPATIENT)
Dept: CALL CENTER | Facility: HOSPITAL | Age: 62
End: 2022-06-09

## 2022-06-09 LAB
BACTERIA SPEC AEROBE CULT: NORMAL
BACTERIA SPEC AEROBE CULT: NORMAL

## 2022-06-09 NOTE — OUTREACH NOTE
Call Center TCM Note    Flowsheet Row Responses   [facility] patient discharged from? [place]   Does the patient have one of the following disease processes/diagnoses(primary or secondary)? COPD/Pneumonia   Was the primary reason for admission: Pneumonia   TCM attempt successful? No   Unsuccessful attempts Attempt 3          Tara Zimmerman LPN    6/9/2022, 16:32 EDT

## 2022-06-13 ENCOUNTER — OFFICE VISIT (OUTPATIENT)
Dept: FAMILY MEDICINE CLINIC | Facility: CLINIC | Age: 62
End: 2022-06-13

## 2022-06-13 ENCOUNTER — TELEPHONE (OUTPATIENT)
Dept: FAMILY MEDICINE CLINIC | Facility: CLINIC | Age: 62
End: 2022-06-13

## 2022-06-13 VITALS
DIASTOLIC BLOOD PRESSURE: 72 MMHG | BODY MASS INDEX: 36.65 KG/M2 | OXYGEN SATURATION: 98 % | SYSTOLIC BLOOD PRESSURE: 108 MMHG | HEART RATE: 86 BPM | HEIGHT: 65 IN | WEIGHT: 220 LBS

## 2022-06-13 DIAGNOSIS — J18.9 MULTIFOCAL PNEUMONIA: ICD-10-CM

## 2022-06-13 DIAGNOSIS — Z92.89 HISTORY OF RECENT HOSPITALIZATION: Primary | ICD-10-CM

## 2022-06-13 DIAGNOSIS — B37.31 VAGINAL YEAST INFECTION: ICD-10-CM

## 2022-06-13 PROBLEM — M54.50 CHRONIC LOW BACK PAIN: Status: ACTIVE | Noted: 2022-03-01

## 2022-06-13 PROBLEM — G89.29 CHRONIC LOW BACK PAIN: Status: ACTIVE | Noted: 2022-03-01

## 2022-06-13 PROBLEM — E55.9 VITAMIN D DEFICIENCY: Status: ACTIVE | Noted: 2021-10-21

## 2022-06-13 PROBLEM — N32.81 OVERACTIVE BLADDER: Status: ACTIVE | Noted: 2021-10-21

## 2022-06-13 PROBLEM — M15.9 GENERALIZED OSTEOARTHRITIS: Status: ACTIVE | Noted: 2022-03-01

## 2022-06-13 PROBLEM — R31.29 MICROSCOPIC HEMATURIA: Status: ACTIVE | Noted: 2021-10-21

## 2022-06-13 PROCEDURE — 99214 OFFICE O/P EST MOD 30 MIN: CPT | Performed by: NURSE PRACTITIONER

## 2022-06-13 RX ORDER — OXYCODONE AND ACETAMINOPHEN 10; 325 MG/1; MG/1
1 TABLET ORAL
COMMUNITY

## 2022-06-13 RX ORDER — AMLODIPINE BESYLATE 10 MG/1
TABLET ORAL
COMMUNITY
End: 2022-07-05

## 2022-06-13 RX ORDER — POLYETHYLENE GLYCOL 3350 17 G/17G
POWDER, FOR SOLUTION ORAL DAILY
COMMUNITY

## 2022-06-13 RX ORDER — FLUCONAZOLE 150 MG/1
150 TABLET ORAL ONCE
Qty: 1 TABLET | Refills: 0 | Status: SHIPPED | OUTPATIENT
Start: 2022-06-13 | End: 2022-06-13

## 2022-06-13 RX ORDER — ZOLPIDEM TARTRATE 10 MG/1
10 TABLET ORAL NIGHTLY PRN
COMMUNITY

## 2022-06-13 NOTE — PROGRESS NOTES
Subjective   Sandra Kellogg is a 61 y.o. female.     History of Present Illness   Patient presents for hospital follow-up due to pneumonia. She was admitted on 6/4/2022 and discharged her on 6/8/2022.  Patient reports that she finished her Augmentin yesterday. She reports that she is still feeling weak, but does feel better. Patient reports that she has developed a vaginal yeast infection from her antibiotics and is requesting a dose of diflucan.    The following portions of the patient's history were reviewed and updated as appropriate: allergies, current medications, past family history, past medical history, past social history, past surgical history and problem list.    Review of Systems   Constitutional: Positive for fatigue. Negative for appetite change, chills and fever.   HENT: Negative for congestion, ear pain, postnasal drip, rhinorrhea, sinus pressure, sneezing and sore throat.    Eyes: Negative for redness and itching.   Respiratory: Positive for shortness of breath (with exertion). Negative for cough, chest tightness and wheezing.    Cardiovascular: Negative for chest pain, palpitations and leg swelling.   Musculoskeletal: Negative for myalgias.   Allergic/Immunologic: Negative.    Neurological: Negative for dizziness and headache.       Objective   Physical Exam  Vitals and nursing note reviewed.   Constitutional:       Appearance: Normal appearance. She is well-developed and normal weight.   HENT:      Head: Normocephalic and atraumatic.      Right Ear: External ear normal.      Left Ear: External ear normal.      Nose: Nose normal.      Mouth/Throat:      Pharynx: No oropharyngeal exudate.   Eyes:      General:         Right eye: No discharge.         Left eye: No discharge.      Conjunctiva/sclera: Conjunctivae normal.      Pupils: Pupils are equal, round, and reactive to light.   Neck:      Thyroid: No thyromegaly.   Cardiovascular:      Rate and Rhythm: Normal rate and regular rhythm.      Heart  sounds: Normal heart sounds. No murmur heard.  Pulmonary:      Effort: Pulmonary effort is normal. No tachypnea or respiratory distress.      Breath sounds: Normal breath sounds. No decreased breath sounds, wheezing or rales.   Musculoskeletal:      Cervical back: Normal range of motion and neck supple.   Lymphadenopathy:      Cervical: No cervical adenopathy.   Skin:     General: Skin is warm and dry.   Neurological:      Mental Status: She is alert and oriented to person, place, and time.   Psychiatric:         Behavior: Behavior normal.         Thought Content: Thought content normal.         Judgment: Judgment normal.         Vitals:    06/13/22 1554   BP: 108/72   Pulse: 86   SpO2: 98%     Body mass index is 36.61 kg/m².    Procedures    Assessment & Plan   Problems Addressed this Visit        Pulmonary and Pneumonias    Multifocal pneumonia      Other Visit Diagnoses     History of recent hospitalization    -  Primary    Vaginal yeast infection        Relevant Medications    fluconazole (Diflucan) 150 MG tablet      Diagnoses       Codes Comments    History of recent hospitalization    -  Primary ICD-10-CM: Z92.89  ICD-9-CM: V13.9     Vaginal yeast infection     ICD-10-CM: B37.3  ICD-9-CM: 112.1     Multifocal pneumonia     ICD-10-CM: J18.9  ICD-9-CM: 486         Diflucan 150mg 1p.o. X 1 dose  Follow-up with Dr. Canales as scheduled.          Return if symptoms worsen or fail to improve.

## 2022-06-15 ENCOUNTER — READMISSION MANAGEMENT (OUTPATIENT)
Dept: CALL CENTER | Facility: HOSPITAL | Age: 62
End: 2022-06-15

## 2022-06-15 DIAGNOSIS — Z12.2 ENCOUNTER FOR SCREENING FOR LUNG CANCER: ICD-10-CM

## 2022-06-15 DIAGNOSIS — Z12.2 SCREENING FOR LUNG CANCER: ICD-10-CM

## 2022-06-15 DIAGNOSIS — Z87.891 STOPPED SMOKING WITH GREATER THAN 40 PACK YEAR HISTORY: Primary | ICD-10-CM

## 2022-06-15 NOTE — OUTREACH NOTE
COPD/PN Week 2 Survey    Flowsheet Row Responses   [facility] facility patient discharged from? [place]   Does the patient have one of the following disease processes/diagnoses(primary or secondary)? COPD/Pneumonia   Was the primary reason for admission: Pneumonia   Week 2 attempt successful? No   Unsuccessful attempts Attempt 1          MITZI Duke Registered Nurse

## 2022-06-20 ENCOUNTER — READMISSION MANAGEMENT (OUTPATIENT)
Dept: CALL CENTER | Facility: HOSPITAL | Age: 62
End: 2022-06-20

## 2022-06-20 NOTE — OUTREACH NOTE
COPD/PN Week 2 Survey    Flowsheet Row Responses   [facility] facility patient discharged from? [place]   Does the patient have one of the following disease processes/diagnoses(primary or secondary)? COPD/Pneumonia   Was the primary reason for admission: Pneumonia   Week 2 attempt successful? No   Unsuccessful attempts Attempt 2          RAFA WALTON - Registered Nurse

## 2022-06-28 DIAGNOSIS — Z12.2 SCREENING FOR LUNG CANCER: ICD-10-CM

## 2022-06-28 DIAGNOSIS — Z87.891 STOPPED SMOKING WITH GREATER THAN 40 PACK YEAR HISTORY: ICD-10-CM

## 2022-06-29 RX ORDER — LEVOTHYROXINE SODIUM 0.12 MG/1
TABLET ORAL
Qty: 90 TABLET | Refills: 2 | Status: SHIPPED | OUTPATIENT
Start: 2022-06-29

## 2022-07-05 RX ORDER — AMLODIPINE BESYLATE 10 MG/1
TABLET ORAL
Qty: 90 TABLET | Refills: 1 | Status: SHIPPED | OUTPATIENT
Start: 2022-07-05

## 2022-07-11 RX ORDER — DULOXETIN HYDROCHLORIDE 60 MG/1
CAPSULE, DELAYED RELEASE ORAL
Qty: 90 CAPSULE | Refills: 3 | Status: SHIPPED | OUTPATIENT
Start: 2022-07-11

## 2022-09-12 ENCOUNTER — READMISSION MANAGEMENT (OUTPATIENT)
Dept: CALL CENTER | Facility: HOSPITAL | Age: 62
End: 2022-09-12

## 2022-09-12 NOTE — OUTREACH NOTE
Prep Survey    Flowsheet Row Responses   [facility]   Is LACE score < 7 ? Non-[facility] Discharge   Emergency Room discharge w/ pulse ox? No   Eligibility [facility]    Date of Discharge 09/12/22   Discharge Disposition Home or Self Care   Discharge diagnosis Unknown   Does the patient have one of the following disease processes/diagnoses(primary or secondary)? Other   Prep survey completed? Yes          RAFA WALTON - Registered Nurse

## 2022-09-13 ENCOUNTER — TRANSITIONAL CARE MANAGEMENT TELEPHONE ENCOUNTER (OUTPATIENT)
Dept: CALL CENTER | Facility: HOSPITAL | Age: 62
End: 2022-09-13

## 2022-09-13 NOTE — OUTREACH NOTE
Call Center TCM Note    Flowsheet Row Responses   [facility] patient discharged from? Non[facility]   Does the patient have one of the following disease processes/diagnoses(primary or secondary)? Other   TCM attempt successful? No   Unsuccessful attempts Attempt 1          Trixie Plaza RN    9/13/2022, 09:58 EDT

## 2022-09-13 NOTE — OUTREACH NOTE
Call Center TCM Note    Flowsheet Row Responses   [facility] patient discharged from? Non-[facility]   Does the patient have one of the following disease processes/diagnoses(primary or secondary)? Other   TCM attempt successful? No   Unsuccessful attempts Attempt 2          Trixie Plaza RN    9/13/2022, 11:55 EDT

## 2022-09-14 ENCOUNTER — TRANSITIONAL CARE MANAGEMENT TELEPHONE ENCOUNTER (OUTPATIENT)
Dept: CALL CENTER | Facility: HOSPITAL | Age: 62
End: 2022-09-14

## 2022-09-14 NOTE — OUTREACH NOTE
Call Center TCM Note    Flowsheet Row Responses   [facility] patient discharged from? Non-BH   Does the patient have one of the following disease processes/diagnoses(primary or secondary)? Other   TCM attempt successful? No   Unsuccessful attempts Attempt 3   Wrap up additional comments Unable to reach pt x 3 attempts for TCM call. pt is not yet sched for TCM APPT with PCP Dr Muniz. Pt s/p THR 08/23/2022. Readmit to [facility] 08/25-09/01 for hip pain, sepsis, edema. [facility] 09/01-09/12/2022.           Amy Zelaya MA    9/14/2022, 11:12 EDT

## 2022-09-21 RX ORDER — LISINOPRIL 40 MG/1
TABLET ORAL
Qty: 90 TABLET | OUTPATIENT
Start: 2022-09-21

## 2022-09-21 NOTE — TELEPHONE ENCOUNTER
I called and spoke with Sandra and she states she is no longer taking that medication.  She asked me to ignore this request.

## 2022-10-09 DIAGNOSIS — N39.41 URGE INCONTINENCE OF URINE: ICD-10-CM

## 2022-10-10 RX ORDER — OXYBUTYNIN CHLORIDE 5 MG/1
TABLET ORAL
Qty: 180 TABLET | Refills: 3 | Status: SHIPPED | OUTPATIENT
Start: 2022-10-10

## 2022-11-28 RX ORDER — ISOSORBIDE MONONITRATE 60 MG/1
TABLET, EXTENDED RELEASE ORAL
Qty: 90 TABLET | Refills: 3 | Status: SHIPPED | OUTPATIENT
Start: 2022-11-28

## 2022-12-05 ENCOUNTER — TELEPHONE (OUTPATIENT)
Dept: FAMILY MEDICINE CLINIC | Facility: CLINIC | Age: 62
End: 2022-12-05

## 2022-12-05 DIAGNOSIS — R91.8 LUNG NODULES: Primary | ICD-10-CM

## 2022-12-05 NOTE — TELEPHONE ENCOUNTER
----- Message from LAVONNE Mercado sent at 12/5/2022  7:55 AM EST -----  Regarding: FW: Follow-up CT  Please call patient and remind her that it's time for her follow-up chest CT.  I have put in the order.     Thanks,  LAVONNE Bryson  ----- Message -----  From: Jennifer Fuentes APRN  Sent: 12/5/2022  12:00 AM EST  To: LAVONNE Mercado  Subject: Follow-up CT                                     Order follow-up CT

## 2022-12-05 NOTE — TELEPHONE ENCOUNTER
HUB TO READ:  EDIN for patient to call back. Per Jennifer Fuentes, she is due for her follow-up chest CT.  has placed the orders & the scheduling department will be contacting her to set up an appointment.

## 2023-01-06 ENCOUNTER — TELEPHONE (OUTPATIENT)
Dept: FAMILY MEDICINE CLINIC | Facility: CLINIC | Age: 63
End: 2023-01-06
Payer: MEDICARE

## 2023-01-06 NOTE — TELEPHONE ENCOUNTER
Jam from [employer] asking for call back at [phone] regarding disability forms on this patient. Claim # [id number].

## 2023-01-19 ENCOUNTER — TELEPHONE (OUTPATIENT)
Dept: FAMILY MEDICINE CLINIC | Facility: CLINIC | Age: 63
End: 2023-01-19

## 2023-03-16 ENCOUNTER — OFFICE VISIT (OUTPATIENT)
Dept: FAMILY MEDICINE CLINIC | Facility: CLINIC | Age: 63
End: 2023-03-16
Payer: MEDICARE

## 2023-03-16 VITALS
HEART RATE: 75 BPM | DIASTOLIC BLOOD PRESSURE: 80 MMHG | OXYGEN SATURATION: 97 % | HEIGHT: 65 IN | RESPIRATION RATE: 16 BRPM | BODY MASS INDEX: 39.65 KG/M2 | SYSTOLIC BLOOD PRESSURE: 130 MMHG | WEIGHT: 238 LBS

## 2023-03-16 DIAGNOSIS — I10 ESSENTIAL HYPERTENSION: ICD-10-CM

## 2023-03-16 DIAGNOSIS — R32 URINARY INCONTINENCE IN FEMALE: ICD-10-CM

## 2023-03-16 DIAGNOSIS — N32.81 OVERACTIVE BLADDER: ICD-10-CM

## 2023-03-16 DIAGNOSIS — E03.9 ACQUIRED HYPOTHYROIDISM: ICD-10-CM

## 2023-03-16 DIAGNOSIS — F34.1 PERSISTENT DEPRESSIVE DISORDER: ICD-10-CM

## 2023-03-16 DIAGNOSIS — E78.5 HYPERLIPIDEMIA, UNSPECIFIED HYPERLIPIDEMIA TYPE: ICD-10-CM

## 2023-03-16 DIAGNOSIS — Z00.00 MEDICARE ANNUAL WELLNESS VISIT, SUBSEQUENT: Primary | ICD-10-CM

## 2023-03-16 PROCEDURE — 1160F RVW MEDS BY RX/DR IN RCRD: CPT | Performed by: FAMILY MEDICINE

## 2023-03-16 PROCEDURE — 99214 OFFICE O/P EST MOD 30 MIN: CPT | Performed by: FAMILY MEDICINE

## 2023-03-16 PROCEDURE — G0439 PPPS, SUBSEQ VISIT: HCPCS | Performed by: FAMILY MEDICINE

## 2023-03-16 PROCEDURE — 3075F SYST BP GE 130 - 139MM HG: CPT | Performed by: FAMILY MEDICINE

## 2023-03-16 PROCEDURE — 3079F DIAST BP 80-89 MM HG: CPT | Performed by: FAMILY MEDICINE

## 2023-03-16 PROCEDURE — 96160 PT-FOCUSED HLTH RISK ASSMT: CPT | Performed by: FAMILY MEDICINE

## 2023-03-16 PROCEDURE — 1159F MED LIST DOCD IN RCRD: CPT | Performed by: FAMILY MEDICINE

## 2023-03-16 NOTE — PROGRESS NOTES
Medicare Subsequent Wellness Visit  Subjective   Sandra Kellogg is a 62 y.o. female who presents for an Medicare Wellness Visit.   Patient Care Team:  Jh Muniz MD as PCP - General (Family Medicine)  Joe Crump MD as Consulting Physician (Rheumatology)  Robert Trent MD as Consulting Physician (Gastroenterology)  Joe Jose MD as Consulting Physician (Orthopedic Surgery)  Neal Ford MD as Consulting Physician (Internal Medicine)    Recent Hospitalizations:  none    Age-appropriate Screening Schedule:  Refer to the list below for future screening recommendations based on patient's age. The patient has been provided with a written plan.    Health Maintenance   Topic Date Due   • Pneumococcal Vaccine 0-64 (1 - PCV) 03/16/2024 (Originally 6/24/1966)   • TDAP/TD VACCINES (2 - Td or Tdap) 03/16/2024 (Originally 5/31/2022)   • LUNG CANCER SCREENING  06/24/2023   • ANNUAL WELLNESS VISIT  03/16/2024   • LIPID PANEL  03/16/2024   • MAMMOGRAM  05/16/2024   • PAP SMEAR  10/09/2025   • COLORECTAL CANCER SCREENING  01/27/2029   • HEPATITIS C SCREENING  Completed   • COVID-19 Vaccine  Completed   • INFLUENZA VACCINE  Completed   • ZOSTER VACCINE  Completed   • DIABETIC FOOT EXAM  Discontinued   • HEMOGLOBIN A1C  Discontinued   • DIABETIC EYE EXAM  Discontinued   • URINE MICROALBUMIN  Discontinued     Outpatient Medications Prior to Visit   Medication Sig Dispense Refill   • amLODIPine (NORVASC) 10 MG tablet TAKE 1 TABLET EVERY DAY (Patient taking differently: 5 mg.) 90 tablet 1   • aspirin 81 MG tablet Take 1 tablet by mouth Daily.     • DULoxetine (CYMBALTA) 60 MG capsule TAKE 1 CAPSULE EVERY DAY 90 capsule 3   • famotidine (PEPCID) 20 MG tablet Take 1 tablet by mouth 2 (Two) Times a Day. 180 tablet 2   • hydrALAZINE (APRESOLINE) 25 MG tablet Take 1 tablet by mouth 2 (two) times a day.     • isosorbide mononitrate (IMDUR) 60 MG 24 hr tablet TAKE 1 TABLET EVERY DAY 90 tablet 3   •  lamoTRIgine (LaMICtal) 25 MG tablet 2 tabs po qhs 180 tablet 1   • levothyroxine (SYNTHROID, LEVOTHROID) 125 MCG tablet TAKE 1 TABLET EVERY DAY 90 tablet 2   • metoprolol succinate XL (TOPROL-XL) 50 MG 24 hr tablet TAKE 1 TABLET EVERY DAY (Patient taking differently: 2 tablets Daily. 100 mg in am and 50 mg in pm per bottle) 90 tablet 1   • oxybutynin (DITROPAN) 5 MG tablet TAKE 1 TABLET TWICE DAILY 180 tablet 3   • oxyCODONE-acetaminophen (PERCOCET)  MG per tablet Take 1 tablet by mouth 6 (Six) Times a Day.     • polyethylene glycol (MIRALAX) 17 GM/SCOOP powder Take  by mouth Daily.     • zolpidem (AMBIEN) 10 MG tablet Take 1 tablet by mouth At Night As Needed for Sleep. Pain management provides this.     • Cholecalciferol (Vitamin D3) 1.25 MG (39115 UT) capsule 5,000 Int'l Units. (Patient not taking: Reported on 3/16/2023)     • hydrOXYzine (ATARAX) 25 MG tablet Take 1 tablet by mouth Every 6 (Six) Hours As Needed. (Patient not taking: Reported on 3/16/2023)     • nitroglycerin (NITROSTAT) 0.4 MG SL tablet Place 0.4 mg under the tongue. (Patient not taking: Reported on 3/16/2023)     • Omega-3 Fatty Acids (OMEGA-3 FISH OIL PO) Take 1 tablet by mouth Daily.     • potassium chloride 10 MEQ CR tablet Take 1 tablet by mouth Daily. 30 tablet 0   • sucralfate (CARAFATE) 1 g tablet TAKE 1 TABLET THREE TIMES DAILY WITH MEALS 270 tablet 3     No facility-administered medications prior to visit.      Patient Active Problem List   Diagnosis   • Impingement syndrome of right shoulder   • Osteoarthritis of right hip   • Osteoarthritis of right knee   • Primary osteoarthritis of left hip   • Trochanteric bursitis of left hip   • Trochanteric bursitis of right hip   • Essential hypertension   • Acquired hypothyroidism   • Myocardial infarction (HCC)   • Arthritis of left knee   • Status post total left knee replacement   • Depression   • Urge incontinence of urine   • Dehydration   • Nausea vomiting and diarrhea   •  Polypharmacy   • Duodenitis   • Adenomatous colon polyp   • CKD (chronic kidney disease)   • Hyperkalemia   • Radial head fracture   • Hypocortisolemia (HCC)   • Severe sepsis with acute organ dysfunction (HCC)   • Abdominal aortic aneurysm (AAA) without rupture   • Constipation   • Pelvic floor dysfunction in female   • Stented coronary artery   • Acute renal failure (HCC)   • Chronic kidney disease   • Arteriosclerosis of coronary artery   • Chronic back pain   • Multifocal pneumonia   • Hypokalemia   • Weakness   • Hyperlipidemia   • Chronic low back pain   • Generalized osteoarthritis   • Microscopic hematuria   • Overactive bladder   • Vitamin D deficiency     Depression Screen:   PHQ-2/PHQ-9 Depression Screening 3/16/2023   Retired PHQ-9 Total Score -   Retired Total Score -   Little Interest or Pleasure in Doing Things 0-->not at all   Feeling Down, Depressed or Hopeless 0-->not at all   PHQ-9: Brief Depression Severity Measure Score 0   Little interest or pleasure in doing things -   Feeling down, depressed or hopeless -   Trouble falling or staying asleep or sleeping too much -   Feeling tired or having little energy -   Poor appetite or overeating -   Feeling bad about yourself or that you are a failure or have let yourself or your family down -   Trouble concentrating on things, such as reading the newspaper or watching television -   Moving or speaking so slowly that other people could have noticed or the opposite, being so fidgety -   Thoughts that you would be better off dead or of hurting yourself in some way -   If you checked off any problems, how difficult have these problems made it for you to do your work, take care of things at home or get along with other people? -       Functional and Cognitive Screening:  Functional & Cognitive Status 3/16/2023   Do you have difficulty preparing food and eating? No   Do you have difficulty bathing yourself, getting dressed or grooming yourself? No   Do you  "have difficulty using the toilet? No   Do you have difficulty moving around from place to place? No   Do you have trouble with steps or getting out of a bed or a chair? No   Current Diet Well Balanced Diet   Dental Exam Up to date   Eye Exam Up to date   Exercise (times per week) 0 times per week   Current Exercises Include No Regular Exercise   Current Exercise Activities Include -   Do you need help using the phone?  No   Are you deaf or do you have serious difficulty hearing?  No   Do you need help with transportation? No   Do you need help shopping? No   Do you need help preparing meals?  No   Do you need help with housework?  No   Do you need help with laundry? No   Do you need help taking your medications? No   Do you need help managing money? No   Do you ever drive or ride in a car without wearing a seat belt? No   Have you felt unusual stress, anger or loneliness in the last month? No   Who do you live with? Spouse   If you need help, do you have trouble finding someone available to you? No   Have you been bothered in the last four weeks by sexual problems? No   Do you have difficulty concentrating, remembering or making decisions? No     Does the patient have evidence of cognitive impairment? none    Compared to one year ago, the patient feels their physical health and mental health are the same.     Class 2 Severe Obesity (BMI >=35 and <=39.9). Obesity-related health conditions include the following: hypertension and urinary stress incontinence. Obesity is unchanged. BMI is is above average; no BMI management plan is appropriate. We discussed increasing exercise.       Objective     Vitals:    03/16/23 1357   BP: 130/80   Pulse: 75   Resp: 16   SpO2: 97%   Weight: 108 kg (238 lb)   Height: 165.1 cm (65\")     Body mass index is 39.61 kg/m².    Follow Up:  Medicare Well visit in one year    ADVANCED DIRECTIVES:   ACP discussion was held with the patient during this visit. Patient does not have an advance " directive, information provided.    An After Visit Summary and PPPS with all of these plans were given to the patient.     Additional E&M Note during same encounter follows:  Patient has multiple medical problems which are significant and separately identifiable that require additional work above and beyond the Medicare Wellness Visit.      Subjective   Sandra Kellogg is a 62 y.o. female here for a Medicare Well Visit and who also presents for Hypertension, Hyperlipidemia , Hypothyroidism  Depression and urinary incontinence.     Sandra has a history of chronic hypertension and has been well controlled on current medications.She is tolerating medications without side effect. She reports no vision changes, headaches or lightheadedness. She is requesting refills of medications.     Sandra has a history of chronic hypothyroidism and has been well controlled on current dose of replacement.She report no concerning symptoms: no cold intolerance, fatigue or hair loss.      She has a history of chronic hyperlipidemia and needs lab work today to evaluate response to therapy. She is tolerating medications well without side effects.     Sandra has a hx of depression and states that she is getting good relief from symptoms on current medication, Cymbalta. This is a chronic problem that is responding well to treatment. She  has no intolerable side effects to medication and reports that his helps lift mood and makes daily life, relationships better. No thoughts of self harm expressed.     She has a hx of chronic kidney disease and is followed by nephrology.    Review of Systems   Constitutional: Negative.    HENT: Negative.    Eyes: Negative.    Respiratory: Negative.    Cardiovascular: Negative.    Genitourinary: Positive for urinary incontinence.   Musculoskeletal: Positive for arthralgias and back pain.   Skin: Negative.    Neurological: Negative.    Psychiatric/Behavioral: Negative.        PHYSICAL EXAM  Vitals reviewed   HEENT:  PERRLA, EOMI. Oral mucosa moist,   No LAD.  CV: RRR, no murmurs, rubs, clicks or gallops  LUNGS: CTA bilaterally  EXT: No edema, FROM in bilateral upper and lower ext  NEURO: CN II - XII grossly intact  PSYCH: good mood, positive affect, alert and engaged. No thoughts of self harm  expressed.  Assessment & Plan   Problem List Items Addressed This Visit        Cardiac and Vasculature    Essential hypertension    Overview     Sandra has chronic hypertension and has been well controlled on current medications.She  is monitored by me every 6 months and is here today for follow up. She is tolerating medications without side effect. She reports no vision changes, headaches or lightheadedness. She is requesting refills of medications.  Amlodipine 10 mg a day, hydralazine 25 mg a day, metoprolol XL 50 milligrams and she is taking 100 mg in the morning and 50 mg at night as needed..         Relevant Orders    Lipid Panel With LDL / HDL Ratio (Completed)    Comprehensive Metabolic Panel (Completed)    Hyperlipidemia    Overview     She is not able to tolerate medication and is not taking any at this time            Endocrine and Metabolic    Acquired hypothyroidism    Overview     Sandra has chronic hypertension and has been well controlled on current medications.She  is monitored by me every 6 months and is here today for follow up. She is tolerating medications without side effect. She reports no vision changes, headaches or lightheadedness. She is requesting refills of medications.  Synthroid 125 mcg a day.         Relevant Orders    CBC (No Diff) (Completed)    TSH (Completed)       Genitourinary and Reproductive     Overactive bladder       Mental Health    Depression    Overview     Sandra states that she is getting good relief from symptoms on current medication. This is a chronic problem that is responding well to treatment. She is here for regular 6 month monitoring of response to therapy and reports no intolerable side  effects to medication and reports that this medication helps lift mood and makes daily life, relationships better. No thoughts of self harm expressed.  Duloxetine 60 mg a day.        Other Visit Diagnoses     Medicare annual wellness visit, subsequent    -  Primary    Urinary incontinence in female        Relevant Orders    Ambulatory Referral to Gynecologic Urology             Orders Placed This Encounter   Procedures   • Lipid Panel With LDL / HDL Ratio   • CBC (No Diff)   • Comprehensive Metabolic Panel   • TSH   • Ambulatory Referral to Gynecologic Urology        Return in about 6 months (around 9/16/2023).

## 2023-03-16 NOTE — PATIENT INSTRUCTIONS
Medicare Wellness  Personal Prevention Plan of Service     Date of Office Visit:    Encounter Provider:  Jh Muniz MD  Place of Service:  [facility]  Patient Name: Sandra Kellogg  :  1960    As part of the Medicare Wellness portion of your visit today, we are providing you with this personalized preventive plan of services (PPPS). This plan is based upon recommendations of the [place] Preventive Services Task Force (USPSTF) and the Advisory Committee on Immunization Practices (ACIP).    This lists the preventive care services that should be considered, and provides dates of when you are due. Items listed as completed are up-to-date and do not require any further intervention.    Health Maintenance   Topic Date Due    URINE MICROALBUMIN  Never done    DIABETIC FOOT EXAM  Never done    DIABETIC EYE EXAM  Never done    LIPID PANEL  10/12/2022    HEMOGLOBIN A1C  02/10/2023    Pneumococcal Vaccine 0-64 (1 - PCV) 2024 (Originally 1966)    TDAP/TD VACCINES (2 - Td or Tdap) 2024 (Originally 2022)    LUNG CANCER SCREENING  2023    ANNUAL WELLNESS VISIT  2024    MAMMOGRAM  2024    PAP SMEAR  10/09/2025    COLORECTAL CANCER SCREENING  2029    HEPATITIS C SCREENING  Completed    COVID-19 Vaccine  Completed    INFLUENZA VACCINE  Completed    ZOSTER VACCINE  Completed       Orders Placed This Encounter   Procedures    Lipid Panel With LDL / HDL Ratio     Order Specific Question:   Release to patient     Answer:   Routine Release    CBC (No Diff)     Order Specific Question:   Release to patient     Answer:   Routine Release    Comprehensive Metabolic Panel     Order Specific Question:   Release to patient     Answer:   Routine Release    TSH     Order Specific Question:   Release to patient     Answer:   Routine Release    Ambulatory Referral to Gynecologic Urology     Referral Priority:   Routine     Referral Type:   Consultation      Referral Reason:   Specialty Services Required     Referred to Provider:   Jennifer Ellsworth MD     Requested Specialty:   Urogynecology     Number of Visits Requested:   1       Return in about 6 months (around 9/16/2023).

## 2023-03-17 LAB
ALBUMIN SERPL-MCNC: 4.2 G/DL (ref 3.8–4.8)
ALBUMIN/GLOB SERPL: 1.5 {RATIO} (ref 1.2–2.2)
ALP SERPL-CCNC: 86 IU/L (ref 44–121)
ALT SERPL-CCNC: 17 IU/L (ref 0–32)
AST SERPL-CCNC: 24 IU/L (ref 0–40)
BILIRUB SERPL-MCNC: 0.4 MG/DL (ref 0–1.2)
BUN SERPL-MCNC: 21 MG/DL (ref 8–27)
BUN/CREAT SERPL: 19 (ref 12–28)
CALCIUM SERPL-MCNC: 9.3 MG/DL (ref 8.7–10.3)
CHLORIDE SERPL-SCNC: 104 MMOL/L (ref 96–106)
CHOLEST SERPL-MCNC: 172 MG/DL (ref 100–199)
CO2 SERPL-SCNC: 23 MMOL/L (ref 20–29)
CREAT SERPL-MCNC: 1.13 MG/DL (ref 0.57–1)
EGFRCR SERPLBLD CKD-EPI 2021: 55 ML/MIN/1.73
ERYTHROCYTE [DISTWIDTH] IN BLOOD BY AUTOMATED COUNT: 13 % (ref 11.7–15.4)
GLOBULIN SER CALC-MCNC: 2.8 G/DL (ref 1.5–4.5)
GLUCOSE SERPL-MCNC: 94 MG/DL (ref 70–99)
HCT VFR BLD AUTO: 37.2 % (ref 34–46.6)
HDLC SERPL-MCNC: 41 MG/DL
HGB BLD-MCNC: 12.6 G/DL (ref 11.1–15.9)
LDLC SERPL CALC-MCNC: 106 MG/DL (ref 0–99)
LDLC/HDLC SERPL: 2.6 RATIO (ref 0–3.2)
MCH RBC QN AUTO: 29.7 PG (ref 26.6–33)
MCHC RBC AUTO-ENTMCNC: 33.9 G/DL (ref 31.5–35.7)
MCV RBC AUTO: 88 FL (ref 79–97)
PLATELET # BLD AUTO: 318 X10E3/UL (ref 150–450)
POTASSIUM SERPL-SCNC: 4.1 MMOL/L (ref 3.5–5.2)
PROT SERPL-MCNC: 7 G/DL (ref 6–8.5)
RBC # BLD AUTO: 4.24 X10E6/UL (ref 3.77–5.28)
SODIUM SERPL-SCNC: 142 MMOL/L (ref 134–144)
TRIGL SERPL-MCNC: 139 MG/DL (ref 0–149)
TSH SERPL DL<=0.005 MIU/L-ACNC: 0.95 UIU/ML (ref 0.45–4.5)
VLDLC SERPL CALC-MCNC: 25 MG/DL (ref 5–40)
WBC # BLD AUTO: 8 X10E3/UL (ref 3.4–10.8)

## 2023-03-21 DIAGNOSIS — R91.8 LUNG NODULES: ICD-10-CM

## 2023-05-23 RX ORDER — LEVOTHYROXINE SODIUM 0.12 MG/1
TABLET ORAL
Qty: 90 TABLET | Refills: 2 | Status: SHIPPED | OUTPATIENT
Start: 2023-05-23

## 2023-07-27 ENCOUNTER — TRANSITIONAL CARE MANAGEMENT TELEPHONE ENCOUNTER (OUTPATIENT)
Dept: CALL CENTER | Facility: HOSPITAL | Age: 63
End: 2023-07-27
Payer: MEDICARE

## 2023-07-27 ENCOUNTER — READMISSION MANAGEMENT (OUTPATIENT)
Dept: CALL CENTER | Facility: HOSPITAL | Age: 63
End: 2023-07-27
Payer: MEDICARE

## 2023-07-27 NOTE — OUTREACH NOTE
Call Center TCM Note      Flowsheet Row Responses   [facility] [place] patient discharged from? Non-BH   Does the patient have one of the following disease processes/diagnoses(primary or secondary)? Other   TCM attempt successful? No   Unsuccessful attempts Attempt 1            Amy Zelaya MA    7/27/2023, 11:44 EDT

## 2023-07-27 NOTE — OUTREACH NOTE
Call Center TCM Note      Flowsheet Row Responses   [facility] patient discharged from? Non-BH   Does the patient have one of the following disease processes/diagnoses(primary or secondary)? Other   TCM attempt successful? No   Unsuccessful attempts Attempt 2            Amy Zelaya MA    7/27/2023, 15:11 EDT

## 2023-07-27 NOTE — OUTREACH NOTE
Prep Survey      Flowsheet Row Responses   [facility] patient discharged from? Non-BH   Is LACE score < 7 ? Non-BH Discharge   Eligibility [facility]   Date of Discharge 07/26/23   Discharge diagnosis Multiple fractures of ribs   Does the patient have one of the following disease processes/diagnoses(primary or secondary)? Other   Prep survey completed? Yes            Rocio SOUSA - Registered Nurse

## 2023-07-28 ENCOUNTER — TRANSITIONAL CARE MANAGEMENT TELEPHONE ENCOUNTER (OUTPATIENT)
Dept: CALL CENTER | Facility: HOSPITAL | Age: 63
End: 2023-07-28
Payer: MEDICARE

## 2023-07-28 NOTE — OUTREACH NOTE
Call Center TCM Note      Flowsheet Row Responses   [facility] [place] patient discharged from? Non-BH   Does the patient have one of the following disease processes/diagnoses(primary or secondary)? Other   TCM attempt successful? No   Unsuccessful attempts Attempt 3   Call Status Voice mail issues            Catie Adam RN    7/28/2023, 09:13 EDT

## 2023-09-18 RX ORDER — METOPROLOL SUCCINATE 50 MG/1
TABLET, EXTENDED RELEASE ORAL
Qty: 270 TABLET | OUTPATIENT
Start: 2023-09-18

## 2023-09-20 ENCOUNTER — OFFICE VISIT (OUTPATIENT)
Dept: FAMILY MEDICINE CLINIC | Facility: CLINIC | Age: 63
End: 2023-09-20
Payer: MEDICARE

## 2023-09-20 VITALS
DIASTOLIC BLOOD PRESSURE: 76 MMHG | WEIGHT: 206 LBS | SYSTOLIC BLOOD PRESSURE: 130 MMHG | OXYGEN SATURATION: 96 % | RESPIRATION RATE: 16 BRPM | HEART RATE: 76 BPM | BODY MASS INDEX: 34.32 KG/M2 | HEIGHT: 65 IN

## 2023-09-20 DIAGNOSIS — E03.9 ACQUIRED HYPOTHYROIDISM: Primary | ICD-10-CM

## 2023-09-20 DIAGNOSIS — I10 ESSENTIAL HYPERTENSION: ICD-10-CM

## 2023-09-20 DIAGNOSIS — E78.2 MIXED HYPERLIPIDEMIA: ICD-10-CM

## 2023-09-20 DIAGNOSIS — F34.1 PERSISTENT DEPRESSIVE DISORDER: ICD-10-CM

## 2023-09-20 DIAGNOSIS — F41.9 ANXIETY: ICD-10-CM

## 2023-09-20 DIAGNOSIS — Z79.899 HIGH RISK MEDICATION USE: ICD-10-CM

## 2023-09-20 DIAGNOSIS — Z23 NEED FOR VACCINATION: ICD-10-CM

## 2023-09-20 PROBLEM — J96.01 ACUTE RESPIRATORY FAILURE WITH HYPOXIA: Status: ACTIVE | Noted: 2023-07-27

## 2023-09-20 PROBLEM — S22.49XA RIB FRACTURES: Status: ACTIVE | Noted: 2023-07-24

## 2023-09-20 PROBLEM — R29.6 MULTIPLE FALLS: Status: ACTIVE | Noted: 2023-07-27

## 2023-09-20 PROBLEM — Z95.1 S/P CABG X 3: Status: ACTIVE | Noted: 2023-04-13

## 2023-09-20 PROCEDURE — 90686 IIV4 VACC NO PRSV 0.5 ML IM: CPT | Performed by: FAMILY MEDICINE

## 2023-09-20 PROCEDURE — 1160F RVW MEDS BY RX/DR IN RCRD: CPT | Performed by: FAMILY MEDICINE

## 2023-09-20 PROCEDURE — 99214 OFFICE O/P EST MOD 30 MIN: CPT | Performed by: FAMILY MEDICINE

## 2023-09-20 PROCEDURE — 3078F DIAST BP <80 MM HG: CPT | Performed by: FAMILY MEDICINE

## 2023-09-20 PROCEDURE — G0008 ADMIN INFLUENZA VIRUS VAC: HCPCS | Performed by: FAMILY MEDICINE

## 2023-09-20 PROCEDURE — 1159F MED LIST DOCD IN RCRD: CPT | Performed by: FAMILY MEDICINE

## 2023-09-20 PROCEDURE — 3075F SYST BP GE 130 - 139MM HG: CPT | Performed by: FAMILY MEDICINE

## 2023-09-20 RX ORDER — HYDROXYZINE HYDROCHLORIDE 25 MG/1
25 TABLET, FILM COATED ORAL EVERY 8 HOURS PRN
Qty: 90 TABLET | Refills: 1 | Status: SHIPPED | OUTPATIENT
Start: 2023-09-20

## 2023-09-20 RX ORDER — OXYCODONE HYDROCHLORIDE 5 MG/1
10 TABLET ORAL
COMMUNITY
Start: 2023-08-11 | End: 2023-09-20

## 2023-09-20 RX ORDER — AMLODIPINE BESYLATE 5 MG/1
TABLET ORAL
COMMUNITY
Start: 2023-07-05

## 2023-09-20 NOTE — PROGRESS NOTES
"Subjective   Sandra Kellogg is a 63 y.o. female.   Hypertension, Hypothyroidism, and Hyperlipidemia    History of Present Illness  Here today for follow up of multiple medical issues.  She spends part time here and part time in [place].  She has had CABG  and recently fell  and broke 4 ribs and T7-10.  I managed the following chronic concerns:  Sandra has a history of chronic hypertension and has been well controlled on current medications.She is tolerating medications without side effect. She reports no vision changes, headaches or lightheadedness. She is requesting refills of medications.     Sandra has a history of chronic hypothyroidism and has been well controlled on current dose of replacement.She report no concerning symptoms: no cold intolerance, fatigue or hair loss.      She has a history of chronic hyperlipidemia and needs lab work today to evaluate response to therapy. She is tolerating medications well without side effects.     Sandra has a hx of depression and states that she is getting good relief from symptoms on current medication, Cymbalta. This is a chronic problem that is responding well to treatment. She  has no intolerable side effects to medication and reports that his helps lift mood and makes daily life, relationships better. No thoughts of self harm expressed.     She has a hx of chronic kidney disease and is followed by nephrology.     Review of Systems   Constitutional: Negative.    HENT: Negative.     Eyes: Negative.    Respiratory: Negative.     Cardiovascular: Negative.    Genitourinary:  Positive for urinary incontinence.   Musculoskeletal:  Positive for arthralgias and back pain.   Skin: Negative.    Neurological: Negative.    Psychiatric/Behavioral: Negative.       Objective   Vitals:    09/20/23 1147   BP: 130/76   Pulse: 76   Resp: 16   SpO2: 96%   Weight: 93.4 kg (206 lb)   Height: 165.1 cm (65\")      Body mass index is 34.28 kg/m².        Physical Exam  Vitals and nursing note reviewed. "   Constitutional:       Appearance: Normal appearance. She is well-developed and normal weight.   HENT:      Head: Normocephalic and atraumatic.      Right Ear: External ear normal.      Left Ear: External ear normal.      Nose: Nose normal.      Mouth/Throat:      Pharynx: No oropharyngeal exudate.   Eyes:      General:         Right eye: No discharge.         Left eye: No discharge.      Conjunctiva/sclera: Conjunctivae normal.      Pupils: Pupils are equal, round, and reactive to light.   Neck:      Thyroid: No thyromegaly.   Cardiovascular:      Rate and Rhythm: Normal rate and regular rhythm.      Heart sounds: Normal heart sounds. No murmur heard.  Pulmonary:      Effort: Pulmonary effort is normal. No tachypnea or respiratory distress.      Breath sounds: Normal breath sounds. No decreased breath sounds, wheezing or rales.   Musculoskeletal:      Cervical back: Normal range of motion and neck supple.   Lymphadenopathy:      Cervical: No cervical adenopathy.   Skin:     General: Skin is warm and dry.   Neurological:      Mental Status: She is alert and oriented to person, place, and time.   Psychiatric:         Behavior: Behavior normal.         Thought Content: Thought content normal.         Judgment: Judgment normal.         Assessment & Plan   Problem List Items Addressed This Visit          Cardiac and Vasculature    Essential hypertension    Overview     Sandra has chronic hypertension and has been well controlled on current medications.She  is monitored by me every 6 months and is here today for follow up. She is tolerating medications without side effect. She reports no vision changes, headaches or lightheadedness. She is requesting refills of medications.  Amlodipine 5 mg, hydralazine 25 mg a day, metoprolol  milligrams and she is taking 100 mg in the morning and 50 mg at night as needed..         Relevant Medications    amLODIPine (NORVASC) 5 MG tablet    Hyperlipidemia    Overview     She is  taking rosuvastatin calcium 40 mg a day and tolerating well.         Relevant Medications    Rosuvastatin Calcium 40 MG capsule sprinkle    Other Relevant Orders    Lipid Panel With LDL / HDL Ratio (Completed)       Endocrine and Metabolic    Acquired hypothyroidism - Primary    Overview     Sandra has chronic hypertension and has been well controlled on current medications.She  is monitored by me every 6 months and is here today for follow up. She is tolerating medications without side effect. She reports no vision changes, headaches or lightheadedness. She is requesting refills of medications.  Synthroid 125 mcg a day.         Relevant Orders    TSH (Completed)       Mental Health    Depression    Overview     Sandra states that she is getting good relief from symptoms on current medication. This is a chronic problem that is responding well to treatment. She is here for regular 6 month monitoring of response to therapy and reports no intolerable side effects to medication and reports that this medication helps lift mood and makes daily life, relationships better. No thoughts of self harm expressed.  Duloxetine 60 mg a day.         Relevant Medications    hydrOXYzine (ATARAX) 25 MG tablet     Other Visit Diagnoses       High risk medication use        Relevant Orders    Comprehensive Metabolic Panel (Completed)    CBC (No Diff) (Completed)    Need for vaccination        Relevant Orders    Fluzone >6 Months ([id number]) (Completed)    Anxiety        Relevant Medications    hydrOXYzine (ATARAX) 25 MG tablet               Orders Placed This Encounter   Procedures    Fluzone >6 Months ([id number])    Lipid Panel With LDL / HDL Ratio    Comprehensive Metabolic Panel    CBC (No Diff)    TSH        Return in about 3 months (around 12/20/2023) for Recheck.

## 2023-09-21 LAB
ALBUMIN SERPL-MCNC: 4 G/DL (ref 3.9–4.9)
ALBUMIN/GLOB SERPL: 1.6 {RATIO} (ref 1.2–2.2)
ALP SERPL-CCNC: 109 IU/L (ref 44–121)
ALT SERPL-CCNC: 15 IU/L (ref 0–32)
AST SERPL-CCNC: 15 IU/L (ref 0–40)
BILIRUB SERPL-MCNC: 0.4 MG/DL (ref 0–1.2)
BUN SERPL-MCNC: 17 MG/DL (ref 8–27)
BUN/CREAT SERPL: 15 (ref 12–28)
CALCIUM SERPL-MCNC: 9.2 MG/DL (ref 8.7–10.3)
CHLORIDE SERPL-SCNC: 103 MMOL/L (ref 96–106)
CHOLEST SERPL-MCNC: 178 MG/DL (ref 100–199)
CO2 SERPL-SCNC: 24 MMOL/L (ref 20–29)
CREAT SERPL-MCNC: 1.12 MG/DL (ref 0.57–1)
EGFRCR SERPLBLD CKD-EPI 2021: 56 ML/MIN/1.73
ERYTHROCYTE [DISTWIDTH] IN BLOOD BY AUTOMATED COUNT: 13.7 % (ref 11.7–15.4)
GLOBULIN SER CALC-MCNC: 2.5 G/DL (ref 1.5–4.5)
GLUCOSE SERPL-MCNC: 93 MG/DL (ref 70–99)
HCT VFR BLD AUTO: 38.9 % (ref 34–46.6)
HDLC SERPL-MCNC: 55 MG/DL
HGB BLD-MCNC: 13.2 G/DL (ref 11.1–15.9)
LDLC SERPL CALC-MCNC: 103 MG/DL (ref 0–99)
LDLC/HDLC SERPL: 1.9 RATIO (ref 0–3.2)
MCH RBC QN AUTO: 30.7 PG (ref 26.6–33)
MCHC RBC AUTO-ENTMCNC: 33.9 G/DL (ref 31.5–35.7)
MCV RBC AUTO: 91 FL (ref 79–97)
PLATELET # BLD AUTO: 282 X10E3/UL (ref 150–450)
POTASSIUM SERPL-SCNC: 4 MMOL/L (ref 3.5–5.2)
PROT SERPL-MCNC: 6.5 G/DL (ref 6–8.5)
RBC # BLD AUTO: 4.3 X10E6/UL (ref 3.77–5.28)
SODIUM SERPL-SCNC: 142 MMOL/L (ref 134–144)
TRIGL SERPL-MCNC: 111 MG/DL (ref 0–149)
TSH SERPL DL<=0.005 MIU/L-ACNC: 0.03 UIU/ML (ref 0.45–4.5)
VLDLC SERPL CALC-MCNC: 20 MG/DL (ref 5–40)
WBC # BLD AUTO: 6.2 X10E3/UL (ref 3.4–10.8)

## 2023-12-07 DIAGNOSIS — N39.41 URGE INCONTINENCE OF URINE: ICD-10-CM

## 2023-12-07 RX ORDER — ISOSORBIDE MONONITRATE 60 MG/1
TABLET, EXTENDED RELEASE ORAL
Qty: 90 TABLET | Refills: 3 | Status: SHIPPED | OUTPATIENT
Start: 2023-12-07

## 2023-12-07 RX ORDER — OXYBUTYNIN CHLORIDE 5 MG/1
TABLET ORAL
Qty: 180 TABLET | Refills: 3 | Status: SHIPPED | OUTPATIENT
Start: 2023-12-07

## 2024-02-28 NOTE — PROGRESS NOTES
HPI    Routine dm-dle-11/22    Pt complains of blurred dva. Wearing readers. Complains of vertigo and   eyes twitching. No gtts. Denies any flashes or floaters. States he had eye   pain while on apoorva earlier this month, felt it was possibly a stye. BSL   controlled.     Hemoglobin A1C       Date                     Value               Ref Range             Status                10/25/2023               6.8 (H)             4.0 - 5.6 %           Final              Comment:    ADA Screening Guidelines:  5.7-6.4%  Consistent with   prediabetes  >or=6.5%  Consistent with diabetes    High levels of fetal   hemoglobin interfere with the HbA1C  assay. Heterozygous hemoglobin   variants (HbS, HgC, etc)do  not significantly interfere with this assay.     However, presence of multiple variants may affect accuracy.         04/12/2023               6.8 (H)             4.0 - 5.6 %           Final              Comment:    ADA Screening Guidelines:  5.7-6.4%  Consistent with   prediabetes  >or=6.5%  Consistent with diabetes    High levels of fetal   hemoglobin interfere with the HbA1C  assay. Heterozygous hemoglobin   variants (HbS, HgC, etc)do  not significantly interfere with this assay.     However, presence of multiple variants may affect accuracy.         02/27/2023               6.7 (H)             4.0 - 5.6 %           Final              Comment:    ADA Screening Guidelines:  5.7-6.4%  Consistent with   prediabetes  >or=6.5%  Consistent with diabetes    High levels of fetal   hemoglobin interfere with the HbA1C  assay. Heterozygous hemoglobin   variants (HbS, HgC, etc)do  not significantly interfere with this assay.     However, presence of multiple variants may affect accuracy.    ----------    Last edited by Bambi Noriega on 2/28/2024 10:17 AM.            Assessment /Plan     For exam results, see Encounter Report.    Diabetes mellitus type 2 without retinopathy    Cataract associated with type 2 diabetes  Subjective   Sandra Kellogg is a 59 y.o. female.     History of Present Illness   Sandra is here for follow up of multiple medical issues and rx refills.  She has lost some weight and states her appetite has decreased.  Dr Mayer has added Hydralazine for bp and she is tolerating that well.  Sandra has a history of chronic hypertension and has been well controlled on current medications.She is tolerating medications without side effect. She reports no vision changes, headaches or lightheadedness. She is requesting refills of medications.     Sandra has a history of chronic hypothyroidism and has been well controlled on current dose of replacement.She report no concerning symptoms: no cold intolerance, fatigue or hair loss.      She has a history of chronic hyperlipidemia and needs lab work today to evaluate response to therapy. She is tolerating medications well without side effects.     Sandra has a hx of depression and states that she is getting good relief from symptoms on current medication, Cymbalta. This is a chronic problem that is responding well to treatment. She  has no intolerable side effects to medication and reports that his helps lift mood and makes daily life, relationships better. No thoughts of self harm expressed.     She has a hx of chronic kidney disease and is followed by nephrology.  The following portions of the patient's history were reviewed and updated as appropriate: allergies, current medications, past family history, past medical history, past social history, past surgical history and problem list.    Review of Systems   Constitutional: Negative.    HENT: Negative.    Eyes: Negative.    Respiratory: Negative.    Cardiovascular: Negative.    Gastrointestinal: Positive for abdominal pain.   Genitourinary: Positive for frequency.   Musculoskeletal: Positive for arthralgias and back pain.   Skin: Negative.    Neurological: Negative.         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    Early NS changes, not vis sig for consult   3   . Not suspect, monitor shallow angles OU  4.    RD precautions given and reviewed. Patient knows to call/ message if any further changes in symptoms occur.  5.    Noted OU, not vis sig  6.    Updated for distance only / driving Rx      Will continue with otc only for near   No new refraction      Discussed and educated patient on current findings /plan.  RTC 1 year, prn if any changes / issues                             mood.       Objective   Physical Exam   Constitutional: She is oriented to person, place, and time. She appears well-developed and well-nourished.   Ambulating with a cane   HENT:   Head: Normocephalic and atraumatic.   Eyes: Pupils are equal, round, and reactive to light. EOM are normal.   Neck: Normal range of motion.   Cardiovascular: Normal rate, regular rhythm and normal heart sounds.   No murmur heard.  Pulmonary/Chest: Effort normal and breath sounds normal.   Neurological: She is alert and oriented to person, place, and time.   Psychiatric: She has a normal mood and affect. Her behavior is normal. Judgment and thought content normal.   Nursing note and vitals reviewed.        Assessment/Plan   Sandra was seen today for hypothyroidism, hypertension and hyperlipidemia.    Diagnoses and all orders for this visit:    Essential hypertension  Well controlled on current medication, will refill medication today and as needed. She will RTO for repeat B/P check in 6 months.Follwoed by nephrology.  -     Comprehensive Metabolic Panel    Mixed hyperlipidemia  Will check lipid panel today and She was advised to continue a healthy low fat diet, include regular exercise and take medications as prescribed.  -     Lipid Panel With / Chol / HDL Ratio    Acquired hypothyroidism  Well managed on current dose of thyroid replacement. TSH checked today.  Well refill medications as needed.  -     TSH    Renal insufficiency  Followed by nephrology.  -     CBC (No Diff)    Dysthymia  She is well managed on current meds and reports no signs or symptoms of self harm, suicidal ideation. This medication helps with daily life and relationships. Will refill as needed and advised 6 month follow up.      She will come back in 3 months for a gynecologic exam and 6 months for a Annual Exam.

## 2024-03-21 RX ORDER — LEVOTHYROXINE SODIUM 0.12 MG/1
TABLET ORAL
Qty: 90 TABLET | Refills: 1 | Status: SHIPPED | OUTPATIENT
Start: 2024-03-21

## 2024-06-05 ENCOUNTER — OFFICE VISIT (OUTPATIENT)
Dept: FAMILY MEDICINE CLINIC | Facility: CLINIC | Age: 64
End: 2024-06-05
Payer: MEDICARE

## 2024-06-05 VITALS
SYSTOLIC BLOOD PRESSURE: 124 MMHG | HEIGHT: 65 IN | DIASTOLIC BLOOD PRESSURE: 70 MMHG | OXYGEN SATURATION: 94 % | RESPIRATION RATE: 16 BRPM | BODY MASS INDEX: 31.65 KG/M2 | WEIGHT: 190 LBS | HEART RATE: 77 BPM

## 2024-06-05 DIAGNOSIS — I10 ESSENTIAL HYPERTENSION: ICD-10-CM

## 2024-06-05 DIAGNOSIS — N39.41 URGE INCONTINENCE OF URINE: ICD-10-CM

## 2024-06-05 DIAGNOSIS — Z12.31 ENCOUNTER FOR SCREENING MAMMOGRAM FOR MALIGNANT NEOPLASM OF BREAST: ICD-10-CM

## 2024-06-05 DIAGNOSIS — Z87.891 SMOKING HISTORY: ICD-10-CM

## 2024-06-05 DIAGNOSIS — Z23 NEED FOR VACCINATION: ICD-10-CM

## 2024-06-05 DIAGNOSIS — Z00.00 MEDICARE ANNUAL WELLNESS VISIT, SUBSEQUENT: Primary | ICD-10-CM

## 2024-06-05 DIAGNOSIS — E27.49 HYPOCORTISOLEMIA: ICD-10-CM

## 2024-06-05 DIAGNOSIS — Z78.0 POST-MENOPAUSAL: ICD-10-CM

## 2024-06-05 DIAGNOSIS — R32 URINARY INCONTINENCE, UNSPECIFIED TYPE: ICD-10-CM

## 2024-06-05 DIAGNOSIS — E78.5 HYPERLIPIDEMIA, UNSPECIFIED HYPERLIPIDEMIA TYPE: ICD-10-CM

## 2024-06-05 RX ORDER — LEVOTHYROXINE SODIUM 112 UG/1
112 TABLET ORAL DAILY
COMMUNITY
End: 2024-06-05 | Stop reason: SDUPTHER

## 2024-06-05 RX ORDER — OXYBUTYNIN CHLORIDE 5 MG/1
5 TABLET ORAL 2 TIMES DAILY
Qty: 180 TABLET | Refills: 3 | Status: SHIPPED | OUTPATIENT
Start: 2024-06-05

## 2024-06-05 RX ORDER — SEMAGLUTIDE 1.34 MG/ML
INJECTION, SOLUTION SUBCUTANEOUS WEEKLY
COMMUNITY

## 2024-06-05 NOTE — PROGRESS NOTES
Medicare Subsequent Wellness Visit  Subjective   Sandra Kellogg is a 63 y.o. female who presents for an Medicare Wellness Visit.   Patient Care Team:  Jh Muniz MD as PCP - General (Family Medicine)  Joe Crump MD as Consulting Physician (Rheumatology)  Robert Trent MD as Consulting Physician (Gastroenterology)  Joe Jose MD as Consulting Physician (Orthopedic Surgery)  Neal Ford MD as Consulting Physician (Internal Medicine)    Recent Hospitalizations:  none    Age-appropriate Screening Schedule:  Refer to the list below for future screening recommendations based on patient's age. The patient has been provided with a written plan.    Health Maintenance   Topic Date Due    Pneumococcal Vaccine 0-64 (1 of 2 - PCV) Never done    MAMMOGRAM  05/16/2024    TDAP/TD VACCINES (2 - Td or Tdap) 06/05/2024 (Originally 5/31/2022)    RSV Vaccine - Adults (1 - 1-dose 60+ series) 06/05/2025 (Originally 6/24/2021)    INFLUENZA VACCINE  08/01/2024    LIPID PANEL  09/20/2024    ANNUAL WELLNESS VISIT  06/05/2025    BMI FOLLOWUP  06/05/2025    PAP SMEAR  10/09/2025    COLORECTAL CANCER SCREENING  01/27/2029    HEPATITIS C SCREENING  Completed    COVID-19 Vaccine  Completed    ZOSTER VACCINE  Completed    DIABETIC FOOT EXAM  Discontinued    HEMOGLOBIN A1C  Discontinued    DIABETIC EYE EXAM  Discontinued    URINE MICROALBUMIN  Discontinued    LUNG CANCER SCREENING  Discontinued     Outpatient Medications Prior to Visit   Medication Sig Dispense Refill    amLODIPine (NORVASC) 5 MG tablet       aspirin 81 MG tablet Take 1 tablet by mouth Daily.      famotidine (PEPCID) 20 MG tablet Take 1 tablet by mouth 2 (Two) Times a Day. 180 tablet 2    hydrALAZINE (APRESOLINE) 25 MG tablet Take 1 tablet by mouth 2 (two) times a day.      hydrOXYzine (ATARAX) 25 MG tablet Take 1 tablet by mouth Every 8 (Eight) Hours As Needed for Itching for up to 90 doses. 90 tablet 1    isosorbide mononitrate (IMDUR) 60  MG 24 hr tablet TAKE 1 TABLET EVERY DAY 90 tablet 3    lamoTRIgine (LaMICtal) 25 MG tablet 2 tabs po qhs 180 tablet 1    levothyroxine (SYNTHROID, LEVOTHROID) 112 MCG tablet Take 1 tablet by mouth Daily.      metoprolol succinate XL (TOPROL-XL) 50 MG 24 hr tablet TAKE 1 TABLET EVERY DAY (Patient taking differently: 2 tablets Daily. 100 mg in am and 50 mg in pm per bottle) 90 tablet 1    nitroglycerin (NITROSTAT) 0.4 MG SL tablet Place 1 tablet under the tongue.      oxyCODONE-acetaminophen (PERCOCET)  MG per tablet Take 1 tablet by mouth 6 (Six) Times a Day.      polyethylene glycol (MIRALAX) 17 GM/SCOOP powder Take  by mouth Daily.      Rosuvastatin Calcium 40 MG capsule sprinkle       Semaglutide,0.25 or 0.5MG/DOS, (Ozempic, 0.25 or 0.5 MG/DOSE,) 2 MG/1.5ML solution pen-injector Inject  under the skin into the appropriate area as directed 1 (One) Time Per Week.      DULoxetine (CYMBALTA) 60 MG capsule TAKE 1 CAPSULE EVERY DAY 90 capsule 3    oxybutynin (DITROPAN) 5 MG tablet TAKE 1 TABLET TWICE DAILY 180 tablet 3    levothyroxine (SYNTHROID, LEVOTHROID) 125 MCG tablet TAKE 1 TABLET EVERY DAY (Patient not taking: Reported on 6/5/2024) 90 tablet 1    zolpidem (AMBIEN) 10 MG tablet Take 1 tablet by mouth At Night As Needed for Sleep. Pain management provides this. (Patient not taking: Reported on 6/5/2024)       No facility-administered medications prior to visit.      Patient Active Problem List   Diagnosis    Impingement syndrome of right shoulder    Osteoarthritis of right hip    Osteoarthritis of right knee    Primary osteoarthritis of left hip    Trochanteric bursitis of left hip    Trochanteric bursitis of right hip    Essential hypertension    Acquired hypothyroidism    Myocardial infarction    Arthritis of left knee    Status post total left knee replacement    Depression    Urge incontinence of urine    Dehydration    Nausea vomiting and diarrhea    Polypharmacy    Duodenitis    Adenomatous colon polyp     CKD (chronic kidney disease)    Hyperkalemia    Radial head fracture    Hypocortisolemia    Severe sepsis with acute organ dysfunction    Abdominal aortic aneurysm (AAA) without rupture    Constipation    Pelvic floor dysfunction in female    Stented coronary artery    Acute renal failure    Chronic kidney disease    Arteriosclerosis of coronary artery    Chronic back pain    Multifocal pneumonia    Hypokalemia    Weakness    Hyperlipidemia    Chronic low back pain    Generalized osteoarthritis    Microscopic hematuria    Overactive bladder    Vitamin D deficiency    Acute respiratory failure with hypoxia    Multiple falls    Rib fractures    S/P CABG x 3     Depression Screen:       9/20/2023    11:54 AM   PHQ-2/PHQ-9 Depression Screening   Little Interest or Pleasure in Doing Things 0-->not at all   Feeling Down, Depressed or Hopeless 0-->not at all   PHQ-9: Brief Depression Severity Measure Score 0       Functional and Cognitive Screening:      3/16/2023     2:09 PM   Functional & Cognitive Status   Do you have difficulty preparing food and eating? No   Do you have difficulty bathing yourself, getting dressed or grooming yourself? No   Do you have difficulty using the toilet? No   Do you have difficulty moving around from place to place? No   Do you have trouble with steps or getting out of a bed or a chair? No   Current Diet Well Balanced Diet   Dental Exam Up to date   Eye Exam Up to date   Exercise (times per week) 0 times per week   Current Exercises Include No Regular Exercise   Do you need help using the phone?  No   Are you deaf or do you have serious difficulty hearing?  No   Do you need help to go to places out of walking distance? No   Do you need help shopping? No   Do you need help preparing meals?  No   Do you need help with housework?  No   Do you need help with laundry? No   Do you need help taking your medications? No   Do you need help managing money? No   Do you ever drive or ride in a car  "without wearing a seat belt? No   Have you felt unusual stress, anger or loneliness in the last month? No   Who do you live with? Spouse   If you need help, do you have trouble finding someone available to you? No   Have you been bothered in the last four weeks by sexual problems? No   Do you have difficulty concentrating, remembering or making decisions? No     Does the patient have evidence of cognitive impairment? none    Opioid medication is on active medication list. I have evaluated her active treatment plan and pain .  She is followed by pain management and I do not prescribe this medication.  I have reviewed the chart for potential of high risk medication and harmful drug interactions in the elderly.      Compared to one year ago, the patient feels their physical health and mental health are the same.     BMI is >= 30 and <35. (Class 1 Obesity). The following options were offered after discussion;: weight loss educational material (shared in after visit summary) and exercise counseling/recommendations       Objective     Vitals:    06/05/24 1421   BP: 124/70   Pulse: 77   Resp: 16   SpO2: 94%   Weight: 86.2 kg (190 lb)   Height: 165.1 cm (65\")     Body mass index is 31.62 kg/m².    Follow Up:  Medicare Well visit in one year    ADVANCED DIRECTIVES:   ACP discussion was held with the patient during this visit. Patient does not have an advance directive, information provided.    An After Visit Summary and PPPS with all of these plans were given to the patient.     Additional E&M Note during same encounter follows:  Patient has multiple medical problems which are significant and separately identifiable that require additional work above and beyond the Medicare Wellness Visit.      Subjective   Sandra Kellogg is a 63 y.o. female who also presents for Annual Exam (Subsequent Medicare Exam), Hypertension, and Hyperlipidemia   HPI  Hypertension  She is managing her hypertension well on amlodipine 5 mg a day, " hydralazine 25 mg a day, metoprolol  mg in the morning and 50 mg at night.    Hyperlipidemia  She is taking rosuvastatin 40 mg a day and states that she is tolerating this medication well.  She has no adverse effects and feels it is doing his job.    Hypothroid  She has chronic hypothyroidism and is followed by her endocrinologist.  She is taking levothyroxine 112 mcg a day.    Review of Systems   Constitutional: Negative.    HENT: Negative.     Eyes: Negative.    Respiratory: Negative.     Cardiovascular: Negative.    Genitourinary:  Positive for urinary incontinence.   Musculoskeletal:  Positive for arthralgias and back pain.   Skin: Negative.    Neurological: Negative.    Psychiatric/Behavioral: Negative.         PHYSICAL EXAM  Vitals reviewed   HEENT: PERRLA, EOMI. Oral mucosa moist,   No LAD.  CV: RRR, no murmurs, rubs, clicks or gallops  LUNGS: CTA bilaterally  EXT: No edema, FROM in bilateral upper and lower ext  NEURO: CN II - XII grossly intact  PSYCH: good mood, positive affect, alert and engaged. No thoughts of self harm  expressed.  Assessment & Plan   Problem List Items Addressed This Visit          Cardiac and Vasculature    Essential hypertension    Overview     Sandra has chronic hypertension and has been well controlled on current medications.She  is monitored by me every 6 months and is here today for follow up. She is tolerating medications without side effect. She reports no vision changes, headaches or lightheadedness. She is requesting refills of medications.  Amlodipine 5 mg, hydralazine 25 mg a day, metoprolol  milligrams and she is taking 100 mg in the morning and 50 mg at night as needed..         Hyperlipidemia    Overview     She is taking rosuvastatin calcium 40 mg a day and tolerating well.            Endocrine and Metabolic    Hypocortisolemia    Overview     She is followed by endocrinology and doing well right now on levothyroxine 112 mcg orally daily.             Genitourinary and Reproductive     Urge incontinence of urine     Other Visit Diagnoses       Medicare annual wellness visit, subsequent    -  Primary    Relevant Orders    Lipid Panel With LDL / HDL Ratio    Comprehensive Metabolic Panel    Need for vaccination        Relevant Orders    Pneumococcal Conjugate Vaccine 20-Valent (PCV20)    Encounter for screening mammogram for malignant neoplasm of breast        Relevant Orders    Mammo screening digital tomosynthesis bilateral w CAD    Urinary incontinence, unspecified type        Relevant Medications    oxybutynin (DITROPAN) 5 MG tablet    Post-menopausal        Smoking history        Relevant Orders    CT Chest Low Dose Wo               Orders Placed This Encounter   Procedures    Mammo screening digital tomosynthesis bilateral w CAD    CT Chest Low Dose Wo    Pneumococcal Conjugate Vaccine 20-Valent (PCV20)    Lipid Panel With LDL / HDL Ratio    Comprehensive Metabolic Panel        Return in about 6 months (around 12/5/2024) for Recheck.

## 2024-06-06 LAB
ALBUMIN SERPL-MCNC: 4.4 G/DL (ref 3.5–5.2)
ALBUMIN/GLOB SERPL: 1.7 G/DL
ALP SERPL-CCNC: 87 U/L (ref 39–117)
ALT SERPL-CCNC: 11 U/L (ref 1–33)
AST SERPL-CCNC: 19 U/L (ref 1–32)
BILIRUB SERPL-MCNC: 0.6 MG/DL (ref 0–1.2)
BUN SERPL-MCNC: 10 MG/DL (ref 8–23)
BUN/CREAT SERPL: 9.3 (ref 7–25)
CALCIUM SERPL-MCNC: 9.6 MG/DL (ref 8.6–10.5)
CHLORIDE SERPL-SCNC: 102 MMOL/L (ref 98–107)
CHOLEST SERPL-MCNC: 185 MG/DL (ref 0–200)
CO2 SERPL-SCNC: 26.1 MMOL/L (ref 22–29)
CREAT SERPL-MCNC: 1.07 MG/DL (ref 0.57–1)
EGFRCR SERPLBLD CKD-EPI 2021: 58.9 ML/MIN/1.73
GLOBULIN SER CALC-MCNC: 2.6 GM/DL
GLUCOSE SERPL-MCNC: 96 MG/DL (ref 65–99)
HDLC SERPL-MCNC: 42 MG/DL (ref 40–60)
LDLC SERPL CALC-MCNC: 119 MG/DL (ref 0–100)
LDLC/HDLC SERPL: 2.76 {RATIO}
POTASSIUM SERPL-SCNC: 3.5 MMOL/L (ref 3.5–5.2)
PROT SERPL-MCNC: 7 G/DL (ref 6–8.5)
SODIUM SERPL-SCNC: 139 MMOL/L (ref 136–145)
TRIGL SERPL-MCNC: 135 MG/DL (ref 0–150)
TSH SERPL DL<=0.005 MIU/L-ACNC: 0.31 UIU/ML (ref 0.27–4.2)
VLDLC SERPL CALC-MCNC: 24 MG/DL (ref 5–40)

## 2024-06-06 RX ORDER — LEVOTHYROXINE SODIUM 112 UG/1
112 TABLET ORAL DAILY
Qty: 30 TABLET | Refills: 0 | Status: SHIPPED | OUTPATIENT
Start: 2024-06-06

## 2024-06-06 NOTE — PATIENT INSTRUCTIONS
Medicare Wellness  Personal Prevention Plan of Service     Date of Office Visit:    Encounter Provider:  Jh Muniz MD  Place of Service:  [facility]  Patient Name: Sandra Kellogg  :  1960    As part of the Medicare Wellness portion of your visit today, we are providing you with this personalized preventive plan of services (PPPS). This plan is based upon recommendations of the [place] Preventive Services Task Force (USPSTF) and the Advisory Committee on Immunization Practices (ACIP).    This lists the preventive care services that should be considered, and provides dates of when you are due. Items listed as completed are up-to-date and do not require any further intervention.    Health Maintenance   Topic Date Due    Pneumococcal Vaccine 0-64 (1 of 2 - PCV) Never done    MAMMOGRAM  2024    TDAP/TD VACCINES (2 - Td or Tdap) 2024 (Originally 2022)    RSV Vaccine - Adults (1 - 1-dose 60+ series) 2025 (Originally 2021)    INFLUENZA VACCINE  2024    LIPID PANEL  2024    ANNUAL WELLNESS VISIT  2025    BMI FOLLOWUP  2025    PAP SMEAR  10/09/2025    COLORECTAL CANCER SCREENING  2029    HEPATITIS C SCREENING  Completed    COVID-19 Vaccine  Completed    ZOSTER VACCINE  Completed    DIABETIC FOOT EXAM  Discontinued    HEMOGLOBIN A1C  Discontinued    DIABETIC EYE EXAM  Discontinued    URINE MICROALBUMIN  Discontinued    LUNG CANCER SCREENING  Discontinued       Orders Placed This Encounter   Procedures    Mammo screening digital tomosynthesis bilateral w CAD     Standing Status:   Future     Standing Expiration Date:   2025     Scheduling Instructions:      [place]     Order Specific Question:   Reason for Exam:     Answer:   Breast cancer screening     Order Specific Question:   Does this patient have a diabetic monitoring/medication delivering device on?     Answer:   No     Order Specific Question:   Release to  patient     Answer:   Routine Release [[id number]]    CT Chest Low Dose Wo     Standing Status:   Future     Standing Expiration Date:   6/5/2025     Order Specific Question:   The patient is age 50-80 (Medicare coverage 50-77)     Answer:   63     Order Specific Question:   The patient is a current smoker?     Answer:   No     Order Specific Question:   Is the patient a former smoker who has quit within the last 15 years? (If the answer to this is no they do not meet criteria for this exam)     Answer:   Yes     Order Specific Question:   The number of years since quitting smoking.     Answer:   10     Order Specific Question:   Does the patient have a smoking history of 20 pack-years or greater? (If the answer to this is no they do not meet criteria for this exam)     Answer:   Yes     Order Specific Question:   Actual pack - year smoking history (number):     Answer:   25     Order Specific Question:   Does the patient have any clinical signs/symptoms of lung cancer?     Answer:   No     Order Specific Question:   The patient was engaged in shared decision-making for this test:     Answer:   Yes     Order Specific Question:   Release to patient     Answer:   Routine Release [[id number]]    Pneumococcal Conjugate Vaccine 20-Valent (PCV20)    Lipid Panel With LDL / HDL Ratio     Order Specific Question:   Release to patient     Answer:   Routine Release [[id number]]    Comprehensive Metabolic Panel     Order Specific Question:   Release to patient     Answer:   Routine Release [[id number]]       No follow-ups on file.

## 2024-06-07 LAB — SPECIMEN STATUS: NORMAL

## 2024-06-27 RX ORDER — LEVOTHYROXINE SODIUM 112 UG/1
112 TABLET ORAL DAILY
Qty: 30 TABLET | Refills: 11 | Status: SHIPPED | OUTPATIENT
Start: 2024-06-27

## 2024-07-01 RX ORDER — DULOXETIN HYDROCHLORIDE 60 MG/1
60 CAPSULE, DELAYED RELEASE ORAL DAILY
Qty: 90 CAPSULE | Refills: 3 | Status: SHIPPED | OUTPATIENT
Start: 2024-07-01

## 2024-07-12 ENCOUNTER — OFFICE VISIT (OUTPATIENT)
Dept: FAMILY MEDICINE CLINIC | Facility: CLINIC | Age: 64
End: 2024-07-12
Payer: MEDICARE

## 2024-07-12 VITALS
BODY MASS INDEX: 30.16 KG/M2 | WEIGHT: 181 LBS | SYSTOLIC BLOOD PRESSURE: 110 MMHG | RESPIRATION RATE: 16 BRPM | HEIGHT: 65 IN | DIASTOLIC BLOOD PRESSURE: 74 MMHG | HEART RATE: 74 BPM | OXYGEN SATURATION: 97 %

## 2024-07-12 DIAGNOSIS — M51.06 INTERVERTEBRAL LUMBAR DISC DISORDER WITH MYELOPATHY, LUMBAR REGION: Primary | ICD-10-CM

## 2024-07-12 DIAGNOSIS — G89.4 CHRONIC PAIN DISORDER: ICD-10-CM

## 2024-07-12 DIAGNOSIS — Z98.890 STATUS POST HIP SURGERY: ICD-10-CM

## 2024-07-12 PROCEDURE — 3074F SYST BP LT 130 MM HG: CPT | Performed by: FAMILY MEDICINE

## 2024-07-12 PROCEDURE — 1159F MED LIST DOCD IN RCRD: CPT | Performed by: FAMILY MEDICINE

## 2024-07-12 PROCEDURE — 99214 OFFICE O/P EST MOD 30 MIN: CPT | Performed by: FAMILY MEDICINE

## 2024-07-12 PROCEDURE — 1126F AMNT PAIN NOTED NONE PRSNT: CPT | Performed by: FAMILY MEDICINE

## 2024-07-12 PROCEDURE — 3078F DIAST BP <80 MM HG: CPT | Performed by: FAMILY MEDICINE

## 2024-07-12 PROCEDURE — 1160F RVW MEDS BY RX/DR IN RCRD: CPT | Performed by: FAMILY MEDICINE

## 2024-07-12 RX ORDER — OXYCODONE AND ACETAMINOPHEN 10; 325 MG/1; MG/1
1 TABLET ORAL
Qty: 90 TABLET | Refills: 0 | Status: CANCELLED | OUTPATIENT
Start: 2024-07-12

## 2024-07-12 RX ORDER — OXYCODONE HYDROCHLORIDE 10 MG/1
10 TABLET ORAL EVERY 4 HOURS PRN
Qty: 180 TABLET | Refills: 0 | Status: SHIPPED | OUTPATIENT
Start: 2024-07-12

## 2024-07-12 NOTE — PROGRESS NOTES
"Subjective   Sandra Kellogg is a 64 y.o. female.   Pain    History of Present Illness  Sandra is here for follow up following a Rt THR, 2 weeks ago.  She is doing well .  She states she continues to have pain and especially before and after therapy.  Her pain mgmt MD is having a licensing problem and Sandra is requesting a supply until the situation is resolved.     She has a history of severe back pain as well as this recent surgery of her hip.  She has 3 doses of pain medication per her report.  She was unaware that she was going to need to find a physician to cover for her until she finds another pain management doctor.    She states her pain in her right hip postsurgery is still quite painful having been only 2 weeks, she is walking with a cane and she is finding it very difficult.  Her right hip seems to be healing well but it is certainly not to the point where she can walk without pain.    Her bigger long-term problem is her back her pain management which is now unable to prescribe this medication was prescribing the medication for the back pain and the hip pain.  And that and her hip make this a difficult time.  She is asking for my help.    I have not prescribed controlled substances for her in the past.  I do not see general well prescribed  Review of Systems   Constitutional: Negative.    HENT: Negative.     Eyes: Negative.    Respiratory: Negative.     Cardiovascular: Negative.    Genitourinary:  Positive for urinary incontinence.   Musculoskeletal:  Positive for arthralgias and back pain.        Right hip pain   Skin: Negative.    Neurological: Negative.    Psychiatric/Behavioral: Negative.         Objective   Vitals:    07/12/24 1444   BP: 110/74   Pulse: 74   Resp: 16   SpO2: 97%   Weight: 82.1 kg (181 lb)   Height: 165.1 cm (65\")      Body mass index is 30.12 kg/m².        Physical Exam  Constitutional:       Appearance: She is well-developed.   HENT:      Head: Normocephalic.   Neck:      Thyroid: No " thyromegaly.   Cardiovascular:      Rate and Rhythm: Normal rate and regular rhythm.      Heart sounds: Normal heart sounds.   Pulmonary:      Effort: Pulmonary effort is normal. No tachypnea.      Breath sounds: Normal breath sounds. No decreased breath sounds.   Musculoskeletal:      Cervical back: Normal range of motion.      Comments: Upper right hip bandaged some bleeding noted.    Back pain when rising from chair and any persistent sitting.   Skin:     General: Skin is warm and dry.   Neurological:      Mental Status: She is alert and oriented to person, place, and time.   Psychiatric:         Behavior: Behavior normal.         Thought Content: Thought content normal.         Judgment: Judgment normal.           Assessment & Plan   Problem List Items Addressed This Visit    None  Visit Diagnoses       Intervertebral lumbar disc disorder with myelopathy, lumbar region    -  Primary    Relevant Medications    oxyCODONE (ROXICODONE) 10 MG tablet    Chronic pain disorder        Relevant Medications    oxyCODONE (ROXICODONE) 10 MG tablet    Status post hip surgery        Relevant Medications    oxyCODONE (ROXICODONE) 10 MG tablet        I prescribe medications for pain normally only for no more than a few days.  But I believe she is going to have a difficult time finding anyone else to help her.  So we have made an agreement.  I am giving her 1 month of the medication that she normally takes, oxycodone 10 mg, a total of 180 for 1 month.  I have advised her that I will not repeat this order again.  She has expressed understanding and is looking at this time for a pain management doctor.  Pt was advised that this is a 1 time only prescription. That she should not receive medication from any other MD.  Pt should take medication as prescribed only.  Pt should not share medication with anyone. Pt verbally agreed to this plan.       No orders of the defined types were placed in this encounter.       No follow-ups on  file.

## 2024-07-14 PROBLEM — M16.11 OSTEOARTHRITIS OF RIGHT HIP: Status: ACTIVE | Noted: 2024-06-10

## 2024-07-16 DIAGNOSIS — M70.61 TROCHANTERIC BURSITIS OF RIGHT HIP: Primary | ICD-10-CM

## 2024-07-16 RX ORDER — FLUCONAZOLE 200 MG/1
200 TABLET ORAL DAILY
Qty: 2 TABLET | Refills: 0 | Status: SHIPPED | OUTPATIENT
Start: 2024-07-16

## 2024-09-03 RX ORDER — SEMAGLUTIDE 1.34 MG/ML
0.5 INJECTION, SOLUTION SUBCUTANEOUS WEEKLY
Qty: 1.5 ML | Refills: 2 | Status: SHIPPED | OUTPATIENT
Start: 2024-09-03

## 2024-09-25 DIAGNOSIS — Z12.31 ENCOUNTER FOR SCREENING MAMMOGRAM FOR MALIGNANT NEOPLASM OF BREAST: ICD-10-CM

## 2024-09-25 DIAGNOSIS — Z87.891 SMOKING HISTORY: ICD-10-CM

## 2024-09-27 RX ORDER — ISOSORBIDE MONONITRATE 60 MG/1
TABLET, EXTENDED RELEASE ORAL
Qty: 90 TABLET | Refills: 3 | Status: SHIPPED | OUTPATIENT
Start: 2024-09-27

## 2024-11-29 DIAGNOSIS — E27.49 HYPOCORTISOLEMIA: Primary | ICD-10-CM

## 2024-12-03 DIAGNOSIS — Z98.890 STATUS POST HIP SURGERY: ICD-10-CM

## 2024-12-03 DIAGNOSIS — G89.4 CHRONIC PAIN DISORDER: ICD-10-CM

## 2024-12-03 DIAGNOSIS — M51.06 INTERVERTEBRAL LUMBAR DISC DISORDER WITH MYELOPATHY, LUMBAR REGION: ICD-10-CM

## 2024-12-04 RX ORDER — OXYCODONE HYDROCHLORIDE 10 MG/1
10 TABLET ORAL EVERY 4 HOURS PRN
Qty: 180 TABLET | Refills: 0 | Status: SHIPPED | OUTPATIENT
Start: 2024-12-04

## 2025-01-09 ENCOUNTER — OFFICE VISIT (OUTPATIENT)
Dept: INTERNAL MEDICINE | Facility: CLINIC | Age: 65
End: 2025-01-09
Payer: MEDICARE

## 2025-01-09 VITALS
WEIGHT: 160 LBS | SYSTOLIC BLOOD PRESSURE: 120 MMHG | DIASTOLIC BLOOD PRESSURE: 72 MMHG | HEIGHT: 65 IN | BODY MASS INDEX: 26.66 KG/M2

## 2025-01-09 DIAGNOSIS — I10 ESSENTIAL HYPERTENSION: ICD-10-CM

## 2025-01-09 DIAGNOSIS — E03.9 ACQUIRED HYPOTHYROIDISM: ICD-10-CM

## 2025-01-09 DIAGNOSIS — E55.9 VITAMIN D DEFICIENCY: Primary | ICD-10-CM

## 2025-01-09 DIAGNOSIS — N18.30 STAGE 3 CHRONIC KIDNEY DISEASE, UNSPECIFIED WHETHER STAGE 3A OR 3B CKD: ICD-10-CM

## 2025-01-09 DIAGNOSIS — R73.03 PREDIABETES: ICD-10-CM

## 2025-01-09 DIAGNOSIS — E78.5 HYPERLIPIDEMIA, UNSPECIFIED HYPERLIPIDEMIA TYPE: ICD-10-CM

## 2025-01-09 PROBLEM — E87.5 HYPERKALEMIA: Status: RESOLVED | Noted: 2019-03-01 | Resolved: 2025-01-09

## 2025-01-09 PROBLEM — K29.80 DUODENITIS: Status: RESOLVED | Noted: 2019-01-29 | Resolved: 2025-01-09

## 2025-01-09 PROBLEM — A41.9 SEVERE SEPSIS WITH ACUTE ORGAN DYSFUNCTION: Status: RESOLVED | Noted: 2020-08-15 | Resolved: 2025-01-09

## 2025-01-09 PROBLEM — Z96.652 STATUS POST TOTAL LEFT KNEE REPLACEMENT: Status: RESOLVED | Noted: 2017-11-01 | Resolved: 2025-01-09

## 2025-01-09 PROBLEM — R19.7 NAUSEA VOMITING AND DIARRHEA: Status: RESOLVED | Noted: 2019-01-24 | Resolved: 2025-01-09

## 2025-01-09 PROBLEM — R65.20 SEVERE SEPSIS WITH ACUTE ORGAN DYSFUNCTION: Status: RESOLVED | Noted: 2020-08-15 | Resolved: 2025-01-09

## 2025-01-09 PROBLEM — M54.9 CHRONIC BACK PAIN: Status: RESOLVED | Noted: 2021-10-12 | Resolved: 2025-01-09

## 2025-01-09 PROBLEM — J96.01 ACUTE RESPIRATORY FAILURE WITH HYPOXIA: Status: RESOLVED | Noted: 2023-07-27 | Resolved: 2025-01-09

## 2025-01-09 PROBLEM — N18.9 CKD (CHRONIC KIDNEY DISEASE): Status: RESOLVED | Noted: 2019-02-28 | Resolved: 2025-01-09

## 2025-01-09 PROBLEM — R53.1 WEAKNESS: Status: RESOLVED | Noted: 2022-06-05 | Resolved: 2025-01-09

## 2025-01-09 PROBLEM — Z95.5 STENTED CORONARY ARTERY: Status: RESOLVED | Noted: 2021-10-12 | Resolved: 2025-01-09

## 2025-01-09 PROBLEM — R31.29 MICROSCOPIC HEMATURIA: Status: RESOLVED | Noted: 2021-10-21 | Resolved: 2025-01-09

## 2025-01-09 PROBLEM — E86.0 DEHYDRATION: Status: RESOLVED | Noted: 2019-01-24 | Resolved: 2025-01-09

## 2025-01-09 PROBLEM — M19.90 ARTHRITIS, SENESCENT: Status: ACTIVE | Noted: 2024-06-10

## 2025-01-09 PROBLEM — E87.6 HYPOKALEMIA: Status: RESOLVED | Noted: 2022-06-04 | Resolved: 2025-01-09

## 2025-01-09 PROBLEM — J18.9 MULTIFOCAL PNEUMONIA: Status: RESOLVED | Noted: 2022-06-04 | Resolved: 2025-01-09

## 2025-01-09 PROBLEM — D12.6 ADENOMATOUS COLON POLYP: Status: RESOLVED | Noted: 2019-01-29 | Resolved: 2025-01-09

## 2025-01-09 PROBLEM — I21.9 MYOCARDIAL INFARCTION: Status: RESOLVED | Noted: 2018-01-18 | Resolved: 2025-01-09

## 2025-01-09 PROBLEM — Z79.899 POLYPHARMACY: Status: RESOLVED | Noted: 2019-01-24 | Resolved: 2025-01-09

## 2025-01-09 PROBLEM — M15.9 GENERALIZED OSTEOARTHRITIS: Status: RESOLVED | Noted: 2022-03-01 | Resolved: 2025-01-09

## 2025-01-09 PROBLEM — N17.9 ACUTE RENAL FAILURE: Status: RESOLVED | Noted: 2021-10-12 | Resolved: 2025-01-09

## 2025-01-09 PROBLEM — K59.00 CONSTIPATION: Status: RESOLVED | Noted: 2020-11-30 | Resolved: 2025-01-09

## 2025-01-09 PROBLEM — M19.90 ARTHRITIS, SENESCENT: Status: ACTIVE | Noted: 2017-11-01

## 2025-01-09 PROBLEM — G89.29 CHRONIC BACK PAIN: Status: RESOLVED | Noted: 2021-10-12 | Resolved: 2025-01-09

## 2025-01-09 PROBLEM — R11.2 NAUSEA VOMITING AND DIARRHEA: Status: RESOLVED | Noted: 2019-01-24 | Resolved: 2025-01-09

## 2025-01-09 PROBLEM — R29.6 MULTIPLE FALLS: Status: RESOLVED | Noted: 2023-07-27 | Resolved: 2025-01-09

## 2025-01-09 NOTE — PROGRESS NOTES
"Chief Complaint  Hypertension and Establish Care    Subjective        Sandra Kellogg presents to [facility]  History of Present Illness  #HTN  BP Readings from Last 3 Encounters:   01/09/25 120/72   07/12/24 110/74   06/05/24 124/70     Tolerating amlodipine, imdur, without issue. Taking metoprolol 100qam, 50mg qpm      Objective   Vital Signs:  /72 (BP Location: Left arm, Patient Position: Sitting)   Ht 165.1 cm (65\")   Wt 72.6 kg (160 lb)   BMI 26.63 kg/m²   Estimated body mass index is 26.63 kg/m² as calculated from the following:    Height as of this encounter: 165.1 cm (65\").    Weight as of this encounter: 72.6 kg (160 lb).            Physical Exam   Result Review :                Assessment and Plan   Diagnoses and all orders for this visit:    1. Vitamin D deficiency (Primary)  -     Vitamin D,25-Hydroxy; Future    2. Stage 3 chronic kidney disease, unspecified whether stage 3a or 3b CKD  -     Comprehensive Metabolic Panel; Future  -     CBC & Differential; Future    3. Acquired hypothyroidism  -     TSH; Future    4. Essential hypertension    5. Hyperlipidemia, unspecified hyperlipidemia type  -     Lipid Panel; Future    6. Prediabetes  -     Hemoglobin A1c; Future    Other orders  -     Semaglutide-Weight Management 1 MG/0.5ML solution auto-injector; Inject 0.5 mL under the skin into the appropriate area as directed 1 (One) Time Per Week.  Dispense: 2 mL; Refill: 1    For updated blood work, will try increasing her dose of Wegovy and see if we can get continued benefit.  Blood pressure goal today will obtain fresh evaluation for her as well as a TSH for her thyroid.  Return to clinic in 1 month to discuss         Follow Up   No follow-ups on file.  Patient was given instructions and counseling regarding her condition or for health maintenance advice. Please see specific information pulled into the AVS if appropriate.             "

## 2025-02-12 RX ORDER — AMLODIPINE BESYLATE 5 MG/1
5 TABLET ORAL DAILY
Qty: 90 TABLET | Refills: 1 | Status: SHIPPED | OUTPATIENT
Start: 2025-02-12

## 2025-02-18 ENCOUNTER — PATIENT MESSAGE (OUTPATIENT)
Dept: INTERNAL MEDICINE | Facility: CLINIC | Age: 65
End: 2025-02-18
Payer: MEDICARE

## 2025-02-21 ENCOUNTER — TELEPHONE (OUTPATIENT)
Dept: INTERNAL MEDICINE | Facility: CLINIC | Age: 65
End: 2025-02-21

## 2025-02-21 NOTE — TELEPHONE ENCOUNTER
Caller: Sandra Kellogg    Relationship: Self    Best call back number: [phone]     What medication are you requesting: OZEMPIC 1 MG      If a prescription is needed, what is your preferred pharmacy and phone number: [facility] - [place], [place] - [address] - [phone]  - [phone] FX     Additional notes: PATIENT STATES THAT HER INSURANCE HAS DENIED WEGOVY AND SHE WOULD LIKE TO GO BACK ON OZEMPIC.     PATIENT NEEDS A 3 MONTH SUPPLY OF THIS SENT TO HER MAIL ORDER PHARMACY.     PATIENT HAS A SEPARATE REQUEST FOR THIS FOR ONE MONTH SUPPLY TO BE SENT TO A LOCAL PHARMACY.     PLEASE CALL PATIENT WITH ANY QUESTIONS OR CONCERNS.

## 2025-02-21 NOTE — TELEPHONE ENCOUNTER
Unfortunately this patient is not a diabetic and doesn't qualify for ozempic. If her insurance company has declined to cover wegovy, we are limited in options. I'd recommend she obtain the blood work we ordered for her previously so we can get an updated A1c and see if she qualifies now.

## 2025-02-21 NOTE — TELEPHONE ENCOUNTER
Unfortunately this will not improve our ability to get coverage through her insurance. My suspicion is that they were able to get you a single month of medication prior to your final coverage determination, however Ozempic is not approved for heart issues. If she would like to discuss with her cardiologist to see if they are willing to continue to write it for her, that is certainly reasonable! Otherwise, we will await her blood work and see if she has become diabetic, at which point we could start the approval process for ozempic

## 2025-02-21 NOTE — TELEPHONE ENCOUNTER
Caller: Sandra Kellogg    Relationship: Self    Best call back number: [phone]     What medication are you requesting: OZEMPIC     If a prescription is needed, what is your preferred pharmacy and phone number: [facility] [id number] [place], [place] - [address] [facility] [phone] [facility] [phone]      Additional notes: PATIENT STATES THAT HER PRESCRIPTION FOR WEGOVY WAS DENIED BY HER INSURANCE.     PATIENT STATES THAT SHE WAS ON OZEMPIC PREVIOUSLY AND THAT WAS COVERED SO SHE WOULD LIKE TO GO BACK TO THAT.    PATIENT STATES THAT SHE NEEDS A ONE MONTH SUPPLY SENT TO [facility].

## 2025-02-25 ENCOUNTER — LAB (OUTPATIENT)
Facility: HOSPITAL | Age: 65
End: 2025-02-25
Payer: MEDICARE

## 2025-02-25 PROCEDURE — 82306 VITAMIN D 25 HYDROXY: CPT | Performed by: STUDENT IN AN ORGANIZED HEALTH CARE EDUCATION/TRAINING PROGRAM

## 2025-02-25 PROCEDURE — 84443 ASSAY THYROID STIM HORMONE: CPT | Performed by: STUDENT IN AN ORGANIZED HEALTH CARE EDUCATION/TRAINING PROGRAM

## 2025-02-25 PROCEDURE — 80061 LIPID PANEL: CPT | Performed by: STUDENT IN AN ORGANIZED HEALTH CARE EDUCATION/TRAINING PROGRAM

## 2025-02-25 PROCEDURE — 80053 COMPREHEN METABOLIC PANEL: CPT | Performed by: STUDENT IN AN ORGANIZED HEALTH CARE EDUCATION/TRAINING PROGRAM

## 2025-02-25 PROCEDURE — 83036 HEMOGLOBIN GLYCOSYLATED A1C: CPT | Performed by: STUDENT IN AN ORGANIZED HEALTH CARE EDUCATION/TRAINING PROGRAM

## 2025-02-25 PROCEDURE — 85025 COMPLETE CBC W/AUTO DIFF WBC: CPT | Performed by: STUDENT IN AN ORGANIZED HEALTH CARE EDUCATION/TRAINING PROGRAM

## 2025-02-26 ENCOUNTER — OFFICE VISIT (OUTPATIENT)
Dept: INTERNAL MEDICINE | Facility: CLINIC | Age: 65
End: 2025-02-26
Payer: MEDICARE

## 2025-02-26 VITALS
DIASTOLIC BLOOD PRESSURE: 72 MMHG | BODY MASS INDEX: 26.56 KG/M2 | HEIGHT: 65 IN | WEIGHT: 159.4 LBS | SYSTOLIC BLOOD PRESSURE: 106 MMHG

## 2025-02-26 DIAGNOSIS — N18.31 STAGE 3A CHRONIC KIDNEY DISEASE: ICD-10-CM

## 2025-02-26 DIAGNOSIS — I10 ESSENTIAL HYPERTENSION: ICD-10-CM

## 2025-02-26 DIAGNOSIS — K58.1 IRRITABLE BOWEL SYNDROME WITH CONSTIPATION: ICD-10-CM

## 2025-02-26 DIAGNOSIS — E03.9 ACQUIRED HYPOTHYROIDISM: Primary | ICD-10-CM

## 2025-02-26 DIAGNOSIS — E78.2 MIXED HYPERLIPIDEMIA: ICD-10-CM

## 2025-02-26 PROCEDURE — 99214 OFFICE O/P EST MOD 30 MIN: CPT | Performed by: STUDENT IN AN ORGANIZED HEALTH CARE EDUCATION/TRAINING PROGRAM

## 2025-02-26 PROCEDURE — 1160F RVW MEDS BY RX/DR IN RCRD: CPT | Performed by: STUDENT IN AN ORGANIZED HEALTH CARE EDUCATION/TRAINING PROGRAM

## 2025-02-26 PROCEDURE — 3074F SYST BP LT 130 MM HG: CPT | Performed by: STUDENT IN AN ORGANIZED HEALTH CARE EDUCATION/TRAINING PROGRAM

## 2025-02-26 PROCEDURE — 1126F AMNT PAIN NOTED NONE PRSNT: CPT | Performed by: STUDENT IN AN ORGANIZED HEALTH CARE EDUCATION/TRAINING PROGRAM

## 2025-02-26 PROCEDURE — 1159F MED LIST DOCD IN RCRD: CPT | Performed by: STUDENT IN AN ORGANIZED HEALTH CARE EDUCATION/TRAINING PROGRAM

## 2025-02-26 PROCEDURE — 3078F DIAST BP <80 MM HG: CPT | Performed by: STUDENT IN AN ORGANIZED HEALTH CARE EDUCATION/TRAINING PROGRAM

## 2025-02-26 RX ORDER — ALPRAZOLAM 0.5 MG
TABLET ORAL
COMMUNITY
Start: 2025-02-13

## 2025-02-26 NOTE — PROGRESS NOTES
"Chief Complaint  Acquired hypothyroidism    Subjective        Sandra Kellogg presents to [facility]  History of Present Illness  #Hypothyroidism  Doing well on current dose of Synthroid    #Hypertension  Blood pressure goal today    #Mood   overall quite stable on Cymbalta    #CKD  Stable overall no significant changes from this perspective.  Objective   Vital Signs:  /72 (BP Location: Left arm, Patient Position: Sitting)   Ht 165.1 cm (65\")   Wt 72.3 kg (159 lb 6.4 oz)   BMI 26.53 kg/m²   Estimated body mass index is 26.53 kg/m² as calculated from the following:    Height as of this encounter: 165.1 cm (65\").    Weight as of this encounter: 72.3 kg (159 lb 6.4 oz).            Physical Exam   Result Review :  The following data was reviewed by: DARIELA Bravo MD on 02/26/2025:  Common labs          6/28/2024    10:25 6/28/2024    15:49 9/3/2024    07:00 9/3/2024    09:21 2/25/2025    15:01   Common Labs   Glucose     75    BUN     22    Creatinine     1.24    Sodium     137    Potassium 3.2     3.8      3.5     3.9    Chloride     98    Calcium     9.6    Albumin     4.3    Total Bilirubin     0.5    Alkaline Phosphatase     81    AST (SGOT)     30    ALT (SGPT)     25    WBC   6.71      5.23    Hemoglobin   12.8      12.1    Hematocrit   37.4      35.9    Platelets   289      276    Total Cholesterol     171    Triglycerides     86    HDL Cholesterol     53    LDL Cholesterol      102    Hemoglobin A1C     5.50       Details          This result is from an external source.             CMP          6/28/2024    10:25 6/28/2024    15:49 9/3/2024    09:21 2/25/2025    15:01   CMP   Glucose    75    BUN    22    Creatinine    1.24    EGFR    49.0    Sodium    137    Potassium 3.2     3.8     3.5     3.9    Chloride    98    Calcium    9.6    Total Protein    7.3    Albumin    4.3    Globulin    3.0    Total Bilirubin    0.5    Alkaline Phosphatase    81    AST (SGOT)    30    ALT " (SGPT)    25    Albumin/Globulin Ratio    1.4    BUN/Creatinine Ratio    17.7    Anion Gap    11.0       Details          This result is from an external source.             CBC w/diff          5/22/2024    05:12 9/3/2024    07:00 2/25/2025    15:01   CBC w/Diff   WBC 5.08     6.71     5.23    RBC 4.46     4.27     3.90    Hemoglobin 13.1     12.8     12.1    Hematocrit 39.1     37.4     35.9    MCV 87.7     87.6     92.1    MCH 29.4     30     31.0    MCHC 33.5     34.2     33.7    RDW 13.1     13.3     12.1    Platelets 300     289     276    Neutrophil Rel % 36.4     53.4     45.4    Immature Granulocyte Rel % 0.2     0.3     0.0    Lymphocyte Rel % 45.1     32     36.3    Monocyte Rel % 12.4     8.3     9.9    Eosinophil Rel % 5.3     5.4     7.6    Basophil Rel % 0.6     0.6     0.8       Details          This result is from an external source.             Lipid Panel          6/5/2024    15:27 2/25/2025    15:01   Lipid Panel   Total Cholesterol  171    Total Cholesterol 185     Triglycerides 135  86    HDL Cholesterol 42  53    VLDL Cholesterol 24  16    LDL Cholesterol  119  102    LDL/HDL Ratio 2.76  1.90      TSH          6/5/2024    15:27 2/25/2025    15:01   TSH   TSH 0.306  2.320      Most Recent A1C          2/25/2025    15:01   HGBA1C Most Recent   Hemoglobin A1C 5.50                Assessment and Plan   Diagnoses and all orders for this visit:    1. Acquired hypothyroidism (Primary)  Assessment & Plan:  Stable on Synthroid 112 grams a day, tolerating well.  TSH at goal.      2. Stage 3a chronic kidney disease  Assessment & Plan:  Function somewhat worsened today, overall stable, remains in class IIIa      3. Essential hypertension  Assessment & Plan:  Pressure goal today, on amlodipine 5, hydralazine 25, Toprol 100.      4. Mixed hyperlipidemia    5. Irritable bowel syndrome with constipation  -     linaclotide (Linzess) 290 MCG capsule capsule; Take 1 capsule every morning by mouth with a small sip  of water  Dispense: 30 capsule; Refill: 2    Doing well overall, will continue Crestor for her cholesterol, amlodipine hydralazine Toprol for her blood pressure.  No indication for adjustment to dose today, CKD overall quite stable.  Will trial Linzess to see if we can improve her constipation, suspicious this is likely secondary to oxycodone versus IBS-C.  Return to clinic in 6 months         Follow Up   No follow-ups on file.  Patient was given instructions and counseling regarding her condition or for health maintenance advice. Please see specific information pulled into the AVS if appropriate.

## 2025-03-10 DIAGNOSIS — K58.1 IRRITABLE BOWEL SYNDROME WITH CONSTIPATION: ICD-10-CM

## 2025-03-10 RX ORDER — AMLODIPINE BESYLATE 5 MG/1
5 TABLET ORAL DAILY
Qty: 90 TABLET | Refills: 1 | Status: SHIPPED | OUTPATIENT
Start: 2025-03-10

## 2025-03-10 NOTE — TELEPHONE ENCOUNTER
Caller: [facility] - [place], [place] - [address] [facility] - [phone] [facility] [phone]    Relationship: Pharmacy    Best call back number: [phone]     Requested Prescriptions:   Requested Prescriptions     Pending Prescriptions Disp Refills    linaclotide (Linzess) 290 MCG capsule capsule 30 capsule 2     Sig: Take 1 capsule every morning by mouth with a small sip of water    amLODIPine (NORVASC) 5 MG tablet 90 tablet 1     Sig: Take 1 tablet by mouth Daily.        Pharmacy where request should be sent: [facility] - [place], [place] - [address] - [phone]  - [phone] FX     Last office visit with prescribing clinician: 2/26/2025   Last telemedicine visit with prescribing clinician: Visit date not found   Next office visit with prescribing clinician: Visit date not found     Does the patient have less than a 3 day supply:  [x] Yes  [] No      Jina Soliman Rep   03/10/25 13:37 EDT

## 2025-04-23 RX ORDER — DULOXETIN HYDROCHLORIDE 60 MG/1
60 CAPSULE, DELAYED RELEASE ORAL DAILY
Qty: 90 CAPSULE | Refills: 3 | Status: SHIPPED | OUTPATIENT
Start: 2025-04-23

## 2025-04-23 RX ORDER — LEVOTHYROXINE SODIUM 112 UG/1
112 TABLET ORAL DAILY
Qty: 30 TABLET | Refills: 11 | Status: SHIPPED | OUTPATIENT
Start: 2025-04-23

## 2025-04-29 DIAGNOSIS — E27.49 HYPOCORTISOLEMIA: ICD-10-CM

## 2025-05-02 PROBLEM — I20.0 CRESCENDO ANGINA: Status: RESOLVED | Noted: 2024-08-26 | Resolved: 2025-05-02

## 2025-05-06 ENCOUNTER — TELEPHONE (OUTPATIENT)
Dept: INTERNAL MEDICINE | Facility: CLINIC | Age: 65
End: 2025-05-06

## 2025-05-06 NOTE — TELEPHONE ENCOUNTER
Caller: [employer]    Relationship to patient: Other    Best call back number: [phone]     Patient is needing:     MARIA LUISA IS CALLING IN STATING SHE FAXED OVER A DISABILITY FORM ON 4.23.25 AND 5.2.25.     WANTS TO KNOW IF THE OFFICE HAS RECEIVED THIS FAX OR NOT.     PLEASE ADVISE.

## 2025-05-23 DIAGNOSIS — K58.1 IRRITABLE BOWEL SYNDROME WITH CONSTIPATION: ICD-10-CM

## 2025-06-26 ENCOUNTER — TELEPHONE (OUTPATIENT)
Dept: INTERNAL MEDICINE | Facility: CLINIC | Age: 65
End: 2025-06-26
Payer: MEDICARE

## 2025-06-26 DIAGNOSIS — R30.0 DYSURIA: Primary | ICD-10-CM

## 2025-06-26 NOTE — TELEPHONE ENCOUNTER
Left vm to inform patient that order is there and she can go downstairs to the lab tomorrow.

## 2025-06-26 NOTE — TELEPHONE ENCOUNTER
Patient is returning a call to [facility] regarding UTI, patient states she is unable to drop off a urine specimen today , but she can come by in the morning and drop one off.  [phone]

## 2025-06-27 ENCOUNTER — LAB (OUTPATIENT)
Facility: HOSPITAL | Age: 65
End: 2025-06-27
Payer: MEDICARE

## 2025-06-27 DIAGNOSIS — R30.0 DYSURIA: Primary | ICD-10-CM

## 2025-06-27 LAB
BILIRUB UR QL STRIP: NEGATIVE
CLARITY UR: CLEAR
COLOR UR: YELLOW
GLUCOSE UR STRIP-MCNC: NEGATIVE MG/DL
HGB UR QL STRIP.AUTO: NEGATIVE
KETONES UR QL STRIP: NEGATIVE
LEUKOCYTE ESTERASE UR QL STRIP.AUTO: NEGATIVE
NITRITE UR QL STRIP: NEGATIVE
PH UR STRIP.AUTO: 6.5 [PH] (ref 5–8)
PROT UR QL STRIP: NEGATIVE
SP GR UR STRIP: 1.01 (ref 1–1.03)
UROBILINOGEN UR QL STRIP: NORMAL

## 2025-06-27 PROCEDURE — 87086 URINE CULTURE/COLONY COUNT: CPT

## 2025-06-27 PROCEDURE — 81003 URINALYSIS AUTO W/O SCOPE: CPT

## 2025-06-29 LAB — BACTERIA SPEC AEROBE CULT: NO GROWTH

## 2025-07-14 RX ORDER — ISOSORBIDE MONONITRATE 60 MG/1
60 TABLET, EXTENDED RELEASE ORAL DAILY
Qty: 90 TABLET | Refills: 3 | OUTPATIENT
Start: 2025-07-14

## (undated) DEVICE — ADAPT CLN BIOGUARD AIR/H2O DISP

## (undated) DEVICE — BITEBLOCK OMNI BLOC

## (undated) DEVICE — SIGMOIDOSCOPIC SUCTION INSTRUMENT 18 FR W/WINGED CAP CONTROL AND 6 FOOT (1.8M) TUBING: Brand: SIGMOIDOSCOPIC

## (undated) DEVICE — SENSR O2 OXIMAX FNGR A/ 18IN NONSTR

## (undated) DEVICE — CANN NASL CO2 TRULINK W/O2 A/

## (undated) DEVICE — ST EXT IV SMARTSITE 2PC M LL 2.8ML 20IN

## (undated) DEVICE — Device: Brand: DEFENDO AIR/WATER/SUCTION AND BIOPSY VALVE

## (undated) DEVICE — THE TORRENT IRRIGATION SCOPE CONNECTOR IS USED WITH THE TORRENT IRRIGATION TUBING TO PROVIDE IRRIGATION FLUIDS SUCH AS STERILE WATER DURING GASTROINTESTINAL ENDOSCOPIC PROCEDURES WHEN USED IN CONJUNCTION WITH AN IRRIGATION PUMP (OR ELECTROSURGICAL UNIT).: Brand: TORRENT

## (undated) DEVICE — TUBING, SUCTION, 1/4" X 10', STRAIGHT: Brand: MEDLINE

## (undated) DEVICE — FRCP BX RADJAW4 NDL 2.8 240CM LG OG BX40

## (undated) DEVICE — GOWN SURG AERO CHROME XL